# Patient Record
Sex: MALE | Race: WHITE | NOT HISPANIC OR LATINO | Employment: OTHER | URBAN - NONMETROPOLITAN AREA
[De-identification: names, ages, dates, MRNs, and addresses within clinical notes are randomized per-mention and may not be internally consistent; named-entity substitution may affect disease eponyms.]

---

## 2021-10-05 ENCOUNTER — OFFICE VISIT (OUTPATIENT)
Dept: GASTROENTEROLOGY | Facility: CLINIC | Age: 71
End: 2021-10-05

## 2021-10-05 VITALS
DIASTOLIC BLOOD PRESSURE: 80 MMHG | TEMPERATURE: 98 F | WEIGHT: 247 LBS | BODY MASS INDEX: 32.74 KG/M2 | SYSTOLIC BLOOD PRESSURE: 122 MMHG | HEIGHT: 73 IN | HEART RATE: 82 BPM | OXYGEN SATURATION: 98 %

## 2021-10-05 DIAGNOSIS — I10 HTN (HYPERTENSION), BENIGN: ICD-10-CM

## 2021-10-05 DIAGNOSIS — Z78.9 NONSMOKER: ICD-10-CM

## 2021-10-05 DIAGNOSIS — Z12.11 ENCOUNTER FOR SCREENING FOR MALIGNANT NEOPLASM OF COLON: Primary | ICD-10-CM

## 2021-10-05 PROCEDURE — 99204 OFFICE O/P NEW MOD 45 MIN: CPT | Performed by: CLINICAL NURSE SPECIALIST

## 2021-10-05 RX ORDER — ATORVASTATIN CALCIUM 40 MG/1
40 TABLET, FILM COATED ORAL DAILY
COMMUNITY

## 2021-10-05 NOTE — PROGRESS NOTES
Jaime Mckeon  1950      10/5/2021  Chief Complaint   Patient presents with   • Colonoscopy     Subjective   HPI  Jaime Mckeon is a 71 y.o. male who presents as a referral for preventative maintenance. He has no complaints of nausea or vomiting. No change in bowels. No wt loss. No BRBPR. No melena. There is NO family hx for colon cancer. No abdominal pain.  Past Medical History:   Diagnosis Date   • Allergic rhinitis    • Arthritis    • BPH (benign prostatic hyperplasia)    • Disease of thyroid gland    • Hyperlipidemia    • Hypertension    • Obesity      Past Surgical History:   Procedure Laterality Date   • CATARACT EXTRACTION     • COLONOSCOPY  06/27/2006    Hemorrhoids   • ENDOSCOPY  07/11/2006    Grade A esophagitis, small HH   • VASECTOMY       Outpatient Medications Marked as Taking for the 10/5/21 encounter (Office Visit) with Sophie Tavarez APRN   Medication Sig Dispense Refill   • amLODIPine (NORVASC) 10 MG tablet TAKE 1 TABLET BY MOUTH ONCE DAILY.     • atorvastatin (LIPITOR) 40 MG tablet Take 40 mg by mouth Daily.     • azelastine (ASTEPRO) 0.15 % solution nasal spray USE 1 SPRAY INTO EACH NOSTRIL TWICE DAILY     • levothyroxine (SYNTHROID, LEVOTHROID) 25 MCG tablet TAKE 1 TABLET BY MOUTH ONCE DAILY.     • losartan (COZAAR) 100 MG tablet TAKE 1 TABLET BY MOUTH ONCE DAILY     • sildenafil (Viagra) 100 MG tablet Viagra 100 mg tablet   Take 1 tablet every day by oral route.       No Known Allergies  Social History     Socioeconomic History   • Marital status:      Spouse name: Not on file   • Number of children: Not on file   • Years of education: Not on file   • Highest education level: Not on file   Tobacco Use   • Smoking status: Former Smoker   • Smokeless tobacco: Never Used   Substance and Sexual Activity   • Alcohol use: Yes     Comment: Daily wine     Family History   Problem Relation Age of Onset   • Colon cancer Neg Hx    • Colon polyps Neg Hx      Health Maintenance   Topic Date Due  "  • ANNUAL PHYSICAL  Never done   • Pneumococcal Vaccine 65+ (1 of 1 - PPSV23) Never done   • COLORECTAL CANCER SCREENING  06/27/2016   • ZOSTER VACCINE (2 of 3) 05/04/2017   • INFLUENZA VACCINE  Never done   • HEPATITIS C SCREENING  Never done   • LIPID PANEL  Never done   • TDAP/TD VACCINES (2 - Td or Tdap) 07/19/2031   • COVID-19 Vaccine  Completed       REVIEW OF SYSTEMS  General: well appearing, no fever chills or sweats, no unexplained wt loss  HEENT: no acute visual or hearing disturbances  Cardiovascular: No chest pain or palpitations  Pulmonary: No shortness of breath, coughing, wheezing or hemoptysis  : No burning, urgency, hematuria, or dysuria  Musculoskeletal: No joint pain or stiffness  Peripheral: no edema  Skin: No lesions or rashes  Neuro: No dizziness, headaches, stroke, syncope  Endocrine: No hot or cold intolerances  Hematological: No blood dyscrasias    Objective   Vitals:    10/05/21 1310   BP: 122/80   Pulse: 82   Temp: 98 °F (36.7 °C)   SpO2: 98%   Weight: 112 kg (247 lb)   Height: 185.4 cm (73\")     Body mass index is 32.59 kg/m².  Patient's Body mass index is 32.59 kg/m². indicating that he is obese (BMI >30). Obesity-related health conditions include the following: hypertension. Obesity is unchanged. BMI is is above average; BMI management plan is completed. We discussed portion control and increasing exercise..      PHYSICAL EXAM  General: age appropriate well nourished well appearing, no acute distress  Head: normocephalic and atraumatic  Global assessment-supple  Neck-No JVD noted, no lymphadenopathy  Pulmonary-clear to auscultation bilaterally, normal respiratory effort  Cardiovascular-normal rate and rhythm, normal heart sounds, S1 and S2 noted  Abdomen-soft, non tender, non distended, normal bowel sounds all 4 quadrants, no hepatosplenomegaly noted  Extremities-No clubbing cyanosis or edema  Neuro-Non focal, converses appropriately, awake, alert, oriented    Assessment/Plan "     Diagnoses and all orders for this visit:    1. Encounter for screening for malignant neoplasm of colon (Primary)  -     Case Request; Standing  -     Implement Anesthesia Orders Day of Procedure; Standing  -     Obtain Informed Consent; Standing  -     Verify bowel prep was successful; Standing  -     Case Request  -     polyethylene glycol (GoLYTELY) 236 g solution; Take as directed by office instructions.  Dispense: 4000 mL; Refill: 0    2. Nonsmoker    3. HTN (hypertension), benign  Comments:  cont BP medicationthe day of procedure        COLONOSCOPY WITH ANESTHESIA (N/A)  Body mass index is 32.59 kg/m².    Patient instructions on prep prior to procedure provided to the patient.    All risks, benefits, alternatives, and indications of colonoscopy procedure have been discussed with the patient. Risks to include perforation of the colon requiring possible surgery or colostomy, risk of bleeding from biopsies or removal of colon tissue, possibility of missing a colon polyp or cancer, or adverse drug reaction.  Benefits to include the diagnosis and management of disease of the colon and rectum. Alternatives to include barium enema, radiographic evaluation, lab testing or no intervention. Pt verbalizes understanding and agrees.     Sophie Tavarez, APRN  10/5/2021  13:20 CDT      IF YOU SMOKE OR USE TOBACCO PLEASE READ THE FOLLOWIN minutes reading provided    Why is smoking bad for me?  Smoking increases the risk of heart disease, lung disease, vascular disease, stroke, and cancer.     If you smoke, STOP!    If you would like more information on quitting smoking, please visit the [employer] website: [email or web address]   This link will provide additional resources including the QUIT line and the Beat the Pack support groups.     For more information:    Quit Now [place]  -QUIT-NOW  [email or web address]

## 2021-10-06 PROBLEM — Z12.11 ENCOUNTER FOR SCREENING FOR MALIGNANT NEOPLASM OF COLON: Status: ACTIVE | Noted: 2021-10-06

## 2022-02-22 ENCOUNTER — ANESTHESIA (OUTPATIENT)
Dept: GASTROENTEROLOGY | Facility: HOSPITAL | Age: 72
End: 2022-02-22

## 2022-02-22 ENCOUNTER — ANESTHESIA EVENT (OUTPATIENT)
Dept: GASTROENTEROLOGY | Facility: HOSPITAL | Age: 72
End: 2022-02-22

## 2022-02-22 ENCOUNTER — HOSPITAL ENCOUNTER (OUTPATIENT)
Facility: HOSPITAL | Age: 72
Setting detail: HOSPITAL OUTPATIENT SURGERY
Discharge: HOME | End: 2022-02-22
Attending: INTERNAL MEDICINE | Admitting: INTERNAL MEDICINE

## 2022-02-22 VITALS
BODY MASS INDEX: 32.87 KG/M2 | HEART RATE: 78 BPM | WEIGHT: 248 LBS | HEIGHT: 73 IN | OXYGEN SATURATION: 100 % | DIASTOLIC BLOOD PRESSURE: 83 MMHG | RESPIRATION RATE: 16 BRPM | TEMPERATURE: 97.5 F | SYSTOLIC BLOOD PRESSURE: 127 MMHG

## 2022-02-22 DIAGNOSIS — Z12.11 ENCOUNTER FOR SCREENING FOR MALIGNANT NEOPLASM OF COLON: ICD-10-CM

## 2022-02-22 PROCEDURE — 45380 COLONOSCOPY AND BIOPSY: CPT | Performed by: INTERNAL MEDICINE

## 2022-02-22 PROCEDURE — 25010000002 PROPOFOL 10 MG/ML EMULSION: Performed by: NURSE ANESTHETIST, CERTIFIED REGISTERED

## 2022-02-22 PROCEDURE — 45385 COLONOSCOPY W/LESION REMOVAL: CPT | Performed by: INTERNAL MEDICINE

## 2022-02-22 PROCEDURE — 88305 TISSUE EXAM BY PATHOLOGIST: CPT | Performed by: INTERNAL MEDICINE

## 2022-02-22 RX ORDER — SODIUM CHLORIDE 0.9 % (FLUSH) 0.9 %
10 SYRINGE (ML) INJECTION AS NEEDED
Status: CANCELLED | OUTPATIENT
Start: 2022-02-22

## 2022-02-22 RX ORDER — SODIUM CHLORIDE 9 MG/ML
500 INJECTION, SOLUTION INTRAVENOUS CONTINUOUS PRN
Status: DISCONTINUED | OUTPATIENT
Start: 2022-02-22 | End: 2022-02-22 | Stop reason: HOSPADM

## 2022-02-22 RX ORDER — SODIUM CHLORIDE 0.9 % (FLUSH) 0.9 %
10 SYRINGE (ML) INJECTION AS NEEDED
Status: DISCONTINUED | OUTPATIENT
Start: 2022-02-22 | End: 2022-02-22 | Stop reason: HOSPADM

## 2022-02-22 RX ORDER — PROPOFOL 10 MG/ML
VIAL (ML) INTRAVENOUS AS NEEDED
Status: DISCONTINUED | OUTPATIENT
Start: 2022-02-22 | End: 2022-02-22 | Stop reason: SURG

## 2022-02-22 RX ORDER — SODIUM CHLORIDE 0.9 % (FLUSH) 0.9 %
10 SYRINGE (ML) INJECTION EVERY 12 HOURS SCHEDULED
Status: CANCELLED | OUTPATIENT
Start: 2022-02-22

## 2022-02-22 RX ORDER — SODIUM CHLORIDE 9 MG/ML
100 INJECTION, SOLUTION INTRAVENOUS CONTINUOUS
Status: CANCELLED | OUTPATIENT
Start: 2022-02-22

## 2022-02-22 RX ORDER — LIDOCAINE HYDROCHLORIDE 20 MG/ML
INJECTION, SOLUTION EPIDURAL; INFILTRATION; INTRACAUDAL; PERINEURAL AS NEEDED
Status: DISCONTINUED | OUTPATIENT
Start: 2022-02-22 | End: 2022-02-22 | Stop reason: SURG

## 2022-02-22 RX ADMIN — PROPOFOL 200 MG: 10 INJECTION, EMULSION INTRAVENOUS at 08:46

## 2022-02-22 RX ADMIN — LIDOCAINE HYDROCHLORIDE 50 MG: 20 INJECTION, SOLUTION EPIDURAL; INFILTRATION; INTRACAUDAL; PERINEURAL at 08:24

## 2022-02-22 RX ADMIN — PROPOFOL 200 MG: 10 INJECTION, EMULSION INTRAVENOUS at 08:24

## 2022-02-22 RX ADMIN — PROPOFOL 200 MG: 10 INJECTION, EMULSION INTRAVENOUS at 08:32

## 2022-02-22 RX ADMIN — SODIUM CHLORIDE 500 ML: 9 INJECTION, SOLUTION INTRAVENOUS at 07:26

## 2022-02-22 NOTE — ANESTHESIA PREPROCEDURE EVALUATION
Anesthesia Evaluation     Patient summary reviewed and Nursing notes reviewed   no history of anesthetic complications:  NPO Solid Status: > 8 hours  NPO Liquid Status: > 8 hours           Airway   Mallampati: I  TM distance: >3 FB  Neck ROM: full  No difficulty expected  Dental      Pulmonary    (+) a smoker Former,   Cardiovascular   Exercise tolerance: excellent (>7 METS)    (+) hypertension well controlled 2 medications or greater, hyperlipidemia,       Neuro/Psych- negative ROS  GI/Hepatic/Renal/Endo    (+) obesity,   thyroid problem hypothyroidism    Musculoskeletal     (+) arthralgias,   Abdominal    Substance History - negative use     OB/GYN          Other   arthritis,                    Anesthesia Plan    ASA 2     MAC     intravenous induction     Anesthetic plan, all risks, benefits, and alternatives have been provided, discussed and informed consent has been obtained with: patient.        CODE STATUS:

## 2022-02-22 NOTE — ANESTHESIA POSTPROCEDURE EVALUATION
"Patient: Jaime Mckeon    Procedure Summary     Date: 02/22/22 Room / Location: [facility] ENDOSCOPY 5 / [facility] END[facility]    Anesthesia Start: 0823 Anesthesia Stop: 0857    Procedure: COLONOSCOPY WITH ANESTHESIA (N/A ) Diagnosis:       Encounter for screening for malignant neoplasm of colon      (Encounter for screening for malignant neoplasm of colon [Z12.11])    Surgeons: Elan Davis MD Provider: Manuel Mccabe CRNA    Anesthesia Type: MAC ASA Status: 2          Anesthesia Type: MAC    Vitals  Vitals Value Taken Time   /72 02/22/22 0857   Temp     Pulse 87 02/22/22 0857   Resp 11 02/22/22 0856   SpO2 96 % 02/22/22 0857   Vitals shown include unvalidated device data.        Post Anesthesia Care and Evaluation    Patient location during evaluation: PHASE II  Patient participation: complete - patient participated  Level of consciousness: awake and alert  Pain management: adequate  Airway patency: patent  Anesthetic complications: No anesthetic complications    Cardiovascular status: acceptable  Respiratory status: acceptable  Hydration status: acceptable    Comments: Blood pressure 109/72, pulse 87, temperature 97.5 °F (36.4 °C), temperature source Tympanic, resp. rate 11, height 185.4 cm (73\"), weight 112 kg (248 lb), SpO2 96 %.    Pt discharged from PACU based on christopher score >8      "

## 2022-02-28 LAB
CYTO UR: NORMAL
LAB AP CASE REPORT: NORMAL
PATH REPORT.FINAL DX SPEC: NORMAL
PATH REPORT.GROSS SPEC: NORMAL

## 2022-04-13 ENCOUNTER — OFFICE VISIT (OUTPATIENT)
Dept: OTOLARYNGOLOGY | Facility: CLINIC | Age: 72
End: 2022-04-13

## 2022-04-13 VITALS
SYSTOLIC BLOOD PRESSURE: 150 MMHG | DIASTOLIC BLOOD PRESSURE: 83 MMHG | HEART RATE: 84 BPM | HEIGHT: 71 IN | TEMPERATURE: 98.2 F | BODY MASS INDEX: 35.14 KG/M2 | WEIGHT: 251 LBS

## 2022-04-13 DIAGNOSIS — Z86.69 H/O PERFORATION OF TYMPANIC MEMBRANE: Primary | ICD-10-CM

## 2022-04-13 PROCEDURE — 99212 OFFICE O/P EST SF 10 MIN: CPT | Performed by: EMERGENCY MEDICINE

## 2022-04-13 NOTE — PROGRESS NOTES
LICO Neville  [facility]  [address], [address]  [place] [address]  Fax [phone]  Phone [phone]      Visit Type: NEW PATIENT   Chief Complaint   Patient presents with   • Ear Problem     Tm perforation        HPI  He complains of an eardrum perforation. The symptoms are localized to the left side. The patient has had no obvious clinical symptoms symptoms. The symptoms have been resolved  There have been no identified factors that aggravate the symptoms. There have been no factors that have improved the symptoms.    Past Medical History:   Diagnosis Date   • Allergic rhinitis    • Arthritis    • BPH (benign prostatic hyperplasia)    • Disease of thyroid gland    • Hyperlipidemia    • Hypertension    • Obesity        Past Surgical History:   Procedure Laterality Date   • CATARACT EXTRACTION     • COLONOSCOPY  06/27/2006    Hemorrhoids   • COLONOSCOPY N/A 2/22/2022    Procedure: COLONOSCOPY WITH ANESTHESIA;  Surgeon: Elan Davis MD;  Location: [facility] ENDOSCOPY;  Service: Gastroenterology;  Laterality: N/A;  pre: screen  post: polyps. diverticulosis.   Ernesto Swanson MD   • ENDOSCOPY  07/11/2006    Grade A esophagitis, small HH   • VASECTOMY         Family History: His family history is not on file.     Social History: He  reports that he has quit smoking. He has never used smokeless tobacco. He reports current alcohol use. He reports that he does not use drugs.    Home Medications:  amLODIPine, atorvastatin, azelastine, levothyroxine, losartan, and sildenafil    Allergies:  He has No Known Allergies.       Vital Signs:   Temp:  [98.2 °F (36.8 °C)] 98.2 °F (36.8 °C)  Heart Rate:  [84] 84  BP: (150)/(83) 150/83  ENT Physical Exam  Ear  Hearing: intact;  Auricles: right auricle normal; left auricle normal;  External Mastoids: right external mastoid normal; left external mastoid normal;  Ear Canals: right ear  canal normal; left ear canal normal;  Tympanic Membranes: right tympanic membrane normal; left tympanic membrane normal;         Result Review    RESULTS REVIEW    I have reviewed the patients old records in the chart.     Assessment/Plan    Diagnoses and all orders for this visit:    1. H/O perforation of tympanic membrane (Primary)            Conservative management.  There is no evidence of tympanic membrane perforation today.  Patient to call with any new ear problems.      Return if symptoms worsen or fail to improve.      Sophie Mast, APRN  04/13/22  09:57 CDT

## 2023-02-06 ENCOUNTER — APPOINTMENT (OUTPATIENT)
Dept: CT IMAGING | Facility: HOSPITAL | Age: 73
DRG: 62 | End: 2023-02-06
Payer: MEDICARE

## 2023-02-06 ENCOUNTER — HOSPITAL ENCOUNTER (INPATIENT)
Facility: HOSPITAL | Age: 73
LOS: 2 days | Discharge: HOME | DRG: 62 | End: 2023-02-08
Attending: FAMILY MEDICINE | Admitting: INTERNAL MEDICINE
Payer: MEDICARE

## 2023-02-06 DIAGNOSIS — Z78.9 DECREASED ACTIVITIES OF DAILY LIVING (ADL): ICD-10-CM

## 2023-02-06 DIAGNOSIS — I63.311 CEREBROVASCULAR ACCIDENT (CVA) DUE TO THROMBOSIS OF RIGHT MIDDLE CEREBRAL ARTERY: ICD-10-CM

## 2023-02-06 DIAGNOSIS — I63.9 CEREBROVASCULAR ACCIDENT (CVA), UNSPECIFIED MECHANISM: Primary | ICD-10-CM

## 2023-02-06 DIAGNOSIS — I51.7 ENLARGED LA (LEFT ATRIUM): ICD-10-CM

## 2023-02-06 DIAGNOSIS — Z74.09 IMPAIRED MOBILITY: ICD-10-CM

## 2023-02-06 DIAGNOSIS — Z91.89: ICD-10-CM

## 2023-02-06 LAB
ABO GROUP BLD: NORMAL
ALBUMIN SERPL-MCNC: 4.7 G/DL (ref 3.5–5.2)
ALBUMIN/GLOB SERPL: 1.7 G/DL
ALP SERPL-CCNC: 115 U/L (ref 39–117)
ALT SERPL W P-5'-P-CCNC: 26 U/L (ref 1–41)
ANION GAP SERPL CALCULATED.3IONS-SCNC: 12 MMOL/L (ref 5–15)
APTT PPP: 23.7 SECONDS (ref 24.1–35)
AST SERPL-CCNC: 28 U/L (ref 1–40)
BASOPHILS # BLD AUTO: 0.06 10*3/MM3 (ref 0–0.2)
BASOPHILS NFR BLD AUTO: 0.7 % (ref 0–1.5)
BILIRUB SERPL-MCNC: 0.2 MG/DL (ref 0–1.2)
BILIRUB UR QL STRIP: NEGATIVE
BLD GP AB SCN SERPL QL: NEGATIVE
BUN SERPL-MCNC: 24 MG/DL (ref 8–23)
BUN/CREAT SERPL: 22.9 (ref 7–25)
CALCIUM SPEC-SCNC: 9.4 MG/DL (ref 8.6–10.5)
CHLORIDE SERPL-SCNC: 107 MMOL/L (ref 98–107)
CK SERPL-CCNC: 253 U/L (ref 20–200)
CLARITY UR: CLEAR
CO2 SERPL-SCNC: 23 MMOL/L (ref 22–29)
COLOR UR: YELLOW
CREAT SERPL-MCNC: 1.05 MG/DL (ref 0.76–1.27)
CRP SERPL-MCNC: <0.3 MG/DL (ref 0–0.5)
DEPRECATED RDW RBC AUTO: 45.8 FL (ref 37–54)
EGFRCR SERPLBLD CKD-EPI 2021: 75.9 ML/MIN/1.73
EOSINOPHIL # BLD AUTO: 0.2 10*3/MM3 (ref 0–0.4)
EOSINOPHIL NFR BLD AUTO: 2.5 % (ref 0.3–6.2)
ERYTHROCYTE [DISTWIDTH] IN BLOOD BY AUTOMATED COUNT: 13.4 % (ref 12.3–15.4)
ERYTHROCYTE [SEDIMENTATION RATE] IN BLOOD: 8 MM/HR (ref 0–20)
GLOBULIN UR ELPH-MCNC: 2.7 GM/DL
GLUCOSE BLDC GLUCOMTR-MCNC: 115 MG/DL (ref 70–130)
GLUCOSE SERPL-MCNC: 118 MG/DL (ref 65–99)
GLUCOSE UR STRIP-MCNC: NEGATIVE MG/DL
HCT VFR BLD AUTO: 40.6 % (ref 37.5–51)
HGB BLD-MCNC: 13.3 G/DL (ref 13–17.7)
HGB UR QL STRIP.AUTO: NEGATIVE
HOLD SPECIMEN: NORMAL
HOLD SPECIMEN: NORMAL
IMM GRANULOCYTES # BLD AUTO: 0.02 10*3/MM3 (ref 0–0.05)
IMM GRANULOCYTES NFR BLD AUTO: 0.2 % (ref 0–0.5)
INR PPP: 1.02 (ref 0.91–1.09)
KETONES UR QL STRIP: NEGATIVE
LEUKOCYTE ESTERASE UR QL STRIP.AUTO: NEGATIVE
LYMPHOCYTES # BLD AUTO: 3.08 10*3/MM3 (ref 0.7–3.1)
LYMPHOCYTES NFR BLD AUTO: 38.4 % (ref 19.6–45.3)
MAGNESIUM SERPL-MCNC: 2 MG/DL (ref 1.6–2.4)
MCH RBC QN AUTO: 30.2 PG (ref 26.6–33)
MCHC RBC AUTO-ENTMCNC: 32.8 G/DL (ref 31.5–35.7)
MCV RBC AUTO: 92.3 FL (ref 79–97)
MONOCYTES # BLD AUTO: 0.98 10*3/MM3 (ref 0.1–0.9)
MONOCYTES NFR BLD AUTO: 12.2 % (ref 5–12)
NEUTROPHILS NFR BLD AUTO: 3.68 10*3/MM3 (ref 1.7–7)
NEUTROPHILS NFR BLD AUTO: 46 % (ref 42.7–76)
NITRITE UR QL STRIP: NEGATIVE
NRBC BLD AUTO-RTO: 0 /100 WBC (ref 0–0.2)
PH UR STRIP.AUTO: 7 [PH] (ref 5–8)
PLATELET # BLD AUTO: 243 10*3/MM3 (ref 140–450)
PMV BLD AUTO: 11.1 FL (ref 6–12)
POTASSIUM SERPL-SCNC: 3.9 MMOL/L (ref 3.5–5.2)
PROT SERPL-MCNC: 7.4 G/DL (ref 6–8.5)
PROT UR QL STRIP: NEGATIVE
PROTHROMBIN TIME: 13.5 SECONDS (ref 11.8–14.8)
RBC # BLD AUTO: 4.4 10*6/MM3 (ref 4.14–5.8)
RH BLD: POSITIVE
SODIUM SERPL-SCNC: 142 MMOL/L (ref 136–145)
SP GR UR STRIP: >=1.03 (ref 1–1.03)
T&S EXPIRATION DATE: NORMAL
TROPONIN T SERPL-MCNC: <0.01 NG/ML (ref 0–0.03)
UROBILINOGEN UR QL STRIP: NORMAL
WBC NRBC COR # BLD: 8.02 10*3/MM3 (ref 3.4–10.8)

## 2023-02-06 PROCEDURE — 86901 BLOOD TYPING SEROLOGIC RH(D): CPT | Performed by: FAMILY MEDICINE

## 2023-02-06 PROCEDURE — 82962 GLUCOSE BLOOD TEST: CPT

## 2023-02-06 PROCEDURE — 81003 URINALYSIS AUTO W/O SCOPE: CPT | Performed by: FAMILY MEDICINE

## 2023-02-06 PROCEDURE — 0 IOPAMIDOL PER 1 ML: Performed by: FAMILY MEDICINE

## 2023-02-06 PROCEDURE — 85025 COMPLETE CBC W/AUTO DIFF WBC: CPT | Performed by: FAMILY MEDICINE

## 2023-02-06 PROCEDURE — 70498 CT ANGIOGRAPHY NECK: CPT

## 2023-02-06 PROCEDURE — 70496 CT ANGIOGRAPHY HEAD: CPT

## 2023-02-06 PROCEDURE — 93010 ELECTROCARDIOGRAM REPORT: CPT | Performed by: INTERNAL MEDICINE

## 2023-02-06 PROCEDURE — 85652 RBC SED RATE AUTOMATED: CPT | Performed by: FAMILY MEDICINE

## 2023-02-06 PROCEDURE — 4A03X5D MEASUREMENT OF ARTERIAL FLOW, INTRACRANIAL, EXTERNAL APPROACH: ICD-10-PCS | Performed by: RADIOLOGY

## 2023-02-06 PROCEDURE — 80053 COMPREHEN METABOLIC PANEL: CPT | Performed by: FAMILY MEDICINE

## 2023-02-06 PROCEDURE — 85610 PROTHROMBIN TIME: CPT | Performed by: FAMILY MEDICINE

## 2023-02-06 PROCEDURE — 3E03317 INTRODUCTION OF OTHER THROMBOLYTIC INTO PERIPHERAL VEIN, PERCUTANEOUS APPROACH: ICD-10-PCS | Performed by: PSYCHIATRY & NEUROLOGY

## 2023-02-06 PROCEDURE — 99285 EMERGENCY DEPT VISIT HI MDM: CPT

## 2023-02-06 PROCEDURE — 99291 CRITICAL CARE FIRST HOUR: CPT | Performed by: PSYCHIATRY & NEUROLOGY

## 2023-02-06 PROCEDURE — 86900 BLOOD TYPING SEROLOGIC ABO: CPT | Performed by: FAMILY MEDICINE

## 2023-02-06 PROCEDURE — 84484 ASSAY OF TROPONIN QUANT: CPT | Performed by: FAMILY MEDICINE

## 2023-02-06 PROCEDURE — 70450 CT HEAD/BRAIN W/O DYE: CPT

## 2023-02-06 PROCEDURE — 0042T HC CT CEREBRAL PERFUSION W/WO CONTRAST: CPT

## 2023-02-06 PROCEDURE — 83735 ASSAY OF MAGNESIUM: CPT | Performed by: FAMILY MEDICINE

## 2023-02-06 PROCEDURE — 82550 ASSAY OF CK (CPK): CPT | Performed by: FAMILY MEDICINE

## 2023-02-06 PROCEDURE — 86850 RBC ANTIBODY SCREEN: CPT | Performed by: FAMILY MEDICINE

## 2023-02-06 PROCEDURE — 93005 ELECTROCARDIOGRAM TRACING: CPT | Performed by: FAMILY MEDICINE

## 2023-02-06 PROCEDURE — 86140 C-REACTIVE PROTEIN: CPT | Performed by: FAMILY MEDICINE

## 2023-02-06 PROCEDURE — 85730 THROMBOPLASTIN TIME PARTIAL: CPT | Performed by: FAMILY MEDICINE

## 2023-02-06 PROCEDURE — 25010000002 ALTEPLASE PER 1 MG: Performed by: PSYCHIATRY & NEUROLOGY

## 2023-02-06 RX ORDER — ASPIRIN 300 MG/1
300 SUPPOSITORY RECTAL DAILY
Status: DISCONTINUED | OUTPATIENT
Start: 2023-02-07 | End: 2023-02-08 | Stop reason: HOSPADM

## 2023-02-06 RX ORDER — SODIUM CHLORIDE 9 MG/ML
40 INJECTION, SOLUTION INTRAVENOUS AS NEEDED
Status: DISCONTINUED | OUTPATIENT
Start: 2023-02-06 | End: 2023-02-08 | Stop reason: HOSPADM

## 2023-02-06 RX ORDER — ASPIRIN 325 MG
325 TABLET ORAL DAILY
Status: DISCONTINUED | OUTPATIENT
Start: 2023-02-07 | End: 2023-02-08 | Stop reason: HOSPADM

## 2023-02-06 RX ORDER — LABETALOL HYDROCHLORIDE 5 MG/ML
INJECTION, SOLUTION INTRAVENOUS
Status: DISCONTINUED
Start: 2023-02-06 | End: 2023-02-06 | Stop reason: WASHOUT

## 2023-02-06 RX ORDER — SODIUM CHLORIDE 0.9 % (FLUSH) 0.9 %
10 SYRINGE (ML) INJECTION AS NEEDED
Status: DISCONTINUED | OUTPATIENT
Start: 2023-02-06 | End: 2023-02-08 | Stop reason: HOSPADM

## 2023-02-06 RX ORDER — SODIUM CHLORIDE 0.9 % (FLUSH) 0.9 %
10 SYRINGE (ML) INJECTION EVERY 12 HOURS SCHEDULED
Status: DISCONTINUED | OUTPATIENT
Start: 2023-02-06 | End: 2023-02-08 | Stop reason: HOSPADM

## 2023-02-06 RX ORDER — AMLODIPINE BESYLATE 10 MG/1
10 TABLET ORAL DAILY
Status: DISCONTINUED | OUTPATIENT
Start: 2023-02-07 | End: 2023-02-08 | Stop reason: HOSPADM

## 2023-02-06 RX ORDER — LABETALOL HYDROCHLORIDE 5 MG/ML
10 INJECTION, SOLUTION INTRAVENOUS
Status: DISCONTINUED | OUTPATIENT
Start: 2023-02-06 | End: 2023-02-08 | Stop reason: HOSPADM

## 2023-02-06 RX ORDER — LOSARTAN POTASSIUM 50 MG/1
100 TABLET ORAL DAILY
Status: DISCONTINUED | OUTPATIENT
Start: 2023-02-07 | End: 2023-02-08 | Stop reason: HOSPADM

## 2023-02-06 RX ORDER — LEVOTHYROXINE SODIUM 0.03 MG/1
25 TABLET ORAL
Status: DISCONTINUED | OUTPATIENT
Start: 2023-02-07 | End: 2023-02-08 | Stop reason: HOSPADM

## 2023-02-06 RX ORDER — ATORVASTATIN CALCIUM 40 MG/1
40 TABLET, FILM COATED ORAL DAILY
Status: DISCONTINUED | OUTPATIENT
Start: 2023-02-07 | End: 2023-02-08 | Stop reason: HOSPADM

## 2023-02-06 RX ORDER — SODIUM CHLORIDE 9 MG/ML
100 INJECTION, SOLUTION INTRAVENOUS ONCE
Status: COMPLETED | OUTPATIENT
Start: 2023-02-06 | End: 2023-02-06

## 2023-02-06 RX ADMIN — SODIUM CHLORIDE 100 ML: 9 INJECTION, SOLUTION INTRAVENOUS at 20:21

## 2023-02-06 RX ADMIN — ALTEPLASE 81 MG: KIT at 19:39

## 2023-02-06 RX ADMIN — Medication 10 ML: at 23:18

## 2023-02-06 RX ADMIN — IOPAMIDOL 125 ML: 755 INJECTION, SOLUTION INTRAVENOUS at 18:46

## 2023-02-07 ENCOUNTER — APPOINTMENT (OUTPATIENT)
Dept: CT IMAGING | Facility: HOSPITAL | Age: 73
DRG: 62 | End: 2023-02-07
Payer: MEDICARE

## 2023-02-07 ENCOUNTER — APPOINTMENT (OUTPATIENT)
Dept: CARDIOLOGY | Facility: HOSPITAL | Age: 73
DRG: 62 | End: 2023-02-07
Payer: MEDICARE

## 2023-02-07 ENCOUNTER — APPOINTMENT (OUTPATIENT)
Dept: MRI IMAGING | Facility: HOSPITAL | Age: 73
DRG: 62 | End: 2023-02-07
Payer: MEDICARE

## 2023-02-07 PROBLEM — I10 ESSENTIAL HYPERTENSION: Status: ACTIVE | Noted: 2023-02-07

## 2023-02-07 LAB
BH CV ECHO MEAS - AO MAX PG: 16.3 MMHG
BH CV ECHO MEAS - AO MEAN PG: 9 MMHG
BH CV ECHO MEAS - AO ROOT DIAM: 3.4 CM
BH CV ECHO MEAS - AO V2 MAX: 202 CM/SEC
BH CV ECHO MEAS - AO V2 VTI: 42.5 CM
BH CV ECHO MEAS - AVA(I,D): 1.61 CM2
BH CV ECHO MEAS - EDV(CUBED): 97.3 ML
BH CV ECHO MEAS - EDV(MOD-SP4): 108 ML
BH CV ECHO MEAS - EF(MOD-SP4): 68.4 %
BH CV ECHO MEAS - ESV(CUBED): 24.4 ML
BH CV ECHO MEAS - ESV(MOD-SP4): 34.1 ML
BH CV ECHO MEAS - FS: 37 %
BH CV ECHO MEAS - IVS/LVPW: 1.1 CM
BH CV ECHO MEAS - IVSD: 1.1 CM
BH CV ECHO MEAS - LA DIMENSION: 3.9 CM
BH CV ECHO MEAS - LAT PEAK E' VEL: 8.7 CM/SEC
BH CV ECHO MEAS - LV MASS(C)D: 169.9 GRAMS
BH CV ECHO MEAS - LV MAX PG: 2.7 MMHG
BH CV ECHO MEAS - LV MEAN PG: 2 MMHG
BH CV ECHO MEAS - LV V1 MAX: 82.8 CM/SEC
BH CV ECHO MEAS - LV V1 VTI: 21.8 CM
BH CV ECHO MEAS - LVIDD: 4.6 CM
BH CV ECHO MEAS - LVIDS: 2.9 CM
BH CV ECHO MEAS - LVOT AREA: 3.1 CM2
BH CV ECHO MEAS - LVOT DIAM: 2 CM
BH CV ECHO MEAS - LVPWD: 1 CM
BH CV ECHO MEAS - MED PEAK E' VEL: 6.1 CM/SEC
BH CV ECHO MEAS - MV A MAX VEL: 108 CM/SEC
BH CV ECHO MEAS - MV DEC SLOPE: 424 CM/SEC2
BH CV ECHO MEAS - MV DEC TIME: 0.2 MSEC
BH CV ECHO MEAS - MV E MAX VEL: 71.8 CM/SEC
BH CV ECHO MEAS - MV E/A: 0.66
BH CV ECHO MEAS - MV P1/2T: 52.8 MSEC
BH CV ECHO MEAS - MVA(P1/2T): 4.2 CM2
BH CV ECHO MEAS - PA V2 MAX: 63.1 CM/SEC
BH CV ECHO MEAS - RAP SYSTOLE: 5 MMHG
BH CV ECHO MEAS - RVDD: 4 CM
BH CV ECHO MEAS - RVSP: 19.9 MMHG
BH CV ECHO MEAS - SV(LVOT): 68.5 ML
BH CV ECHO MEAS - SV(MOD-SP4): 73.9 ML
BH CV ECHO MEAS - TR MAX PG: 14.9 MMHG
BH CV ECHO MEAS - TR MAX VEL: 193 CM/SEC
BH CV ECHO MEASUREMENTS AVERAGE E/E' RATIO: 9.7
BH CV ECHO SHUNT ASSESSMENT PERFORMED (HIDDEN SCRIPTING): 1
BH CV XLRA - TDI S': 20.2 CM/SEC
CHOLEST SERPL-MCNC: 132 MG/DL (ref 0–200)
HBA1C MFR BLD: 5.5 % (ref 4.8–5.6)
HDLC SERPL-MCNC: 44 MG/DL (ref 40–60)
LDLC SERPL CALC-MCNC: 69 MG/DL (ref 0–100)
LDLC/HDLC SERPL: 1.53 {RATIO}
LEFT ATRIUM VOLUME INDEX: 36.2 ML/M2
MAXIMAL PREDICTED HEART RATE: 149 BPM
QT INTERVAL: 350 MS
QTC INTERVAL: 439 MS
STRESS TARGET HR: 127 BPM
TRIGL SERPL-MCNC: 103 MG/DL (ref 0–150)
VLDLC SERPL-MCNC: 19 MG/DL (ref 5–40)

## 2023-02-07 PROCEDURE — 93306 TTE W/DOPPLER COMPLETE: CPT

## 2023-02-07 PROCEDURE — 99291 CRITICAL CARE FIRST HOUR: CPT | Performed by: PSYCHIATRY & NEUROLOGY

## 2023-02-07 PROCEDURE — 83036 HEMOGLOBIN GLYCOSYLATED A1C: CPT | Performed by: PSYCHIATRY & NEUROLOGY

## 2023-02-07 PROCEDURE — 25010000002 LORAZEPAM PER 2 MG: Performed by: INTERNAL MEDICINE

## 2023-02-07 PROCEDURE — 97162 PT EVAL MOD COMPLEX 30 MIN: CPT

## 2023-02-07 PROCEDURE — 97166 OT EVAL MOD COMPLEX 45 MIN: CPT | Performed by: OCCUPATIONAL THERAPIST

## 2023-02-07 PROCEDURE — 70450 CT HEAD/BRAIN W/O DYE: CPT

## 2023-02-07 PROCEDURE — 93306 TTE W/DOPPLER COMPLETE: CPT | Performed by: EMERGENCY MEDICINE

## 2023-02-07 PROCEDURE — 80061 LIPID PANEL: CPT | Performed by: PSYCHIATRY & NEUROLOGY

## 2023-02-07 PROCEDURE — 25010000002 PERFLUTREN 6.52 MG/ML SUSPENSION: Performed by: INTERNAL MEDICINE

## 2023-02-07 PROCEDURE — 92523 SPEECH SOUND LANG COMPREHEN: CPT | Performed by: SPEECH-LANGUAGE PATHOLOGIST

## 2023-02-07 RX ORDER — LATANOPROST 50 UG/ML
1 SOLUTION/ DROPS OPHTHALMIC NIGHTLY
COMMUNITY

## 2023-02-07 RX ORDER — LORAZEPAM 2 MG/ML
2 INJECTION INTRAMUSCULAR ONCE
Status: COMPLETED | OUTPATIENT
Start: 2023-02-07 | End: 2023-02-07

## 2023-02-07 RX ADMIN — Medication 10 ML: at 10:10

## 2023-02-07 RX ADMIN — ATORVASTATIN CALCIUM 40 MG: 40 TABLET, FILM COATED ORAL at 08:15

## 2023-02-07 RX ADMIN — LEVOTHYROXINE SODIUM 25 MCG: 25 TABLET ORAL at 06:13

## 2023-02-07 RX ADMIN — LOSARTAN POTASSIUM 100 MG: 50 TABLET, FILM COATED ORAL at 08:14

## 2023-02-07 RX ADMIN — ASPIRIN 325 MG: 325 TABLET ORAL at 21:27

## 2023-02-07 RX ADMIN — AMLODIPINE BESYLATE 10 MG: 10 TABLET ORAL at 08:14

## 2023-02-07 RX ADMIN — Medication 10 ML: at 21:39

## 2023-02-07 RX ADMIN — PERFLUTREN 8.48 MG: 6.52 INJECTION, SUSPENSION INTRAVENOUS at 10:22

## 2023-02-07 RX ADMIN — LORAZEPAM 2 MG: 2 INJECTION INTRAMUSCULAR at 10:23

## 2023-02-07 NOTE — PLAN OF CARE
Goal Outcome Evaluation:  Plan of Care Reviewed With: (P) patient, spouse        Progress: (P) improving     Patient presents with left hemiparesis secondary to stroke. Pt is alert, oriented, and highly cooperative. There are no complaints of pain. ST assessed the pt's communication and cognitive levels. There are no signs of any communicative deficits. Executive function, reasoning, and memory are WNL. Patient's spouse states that he is at baseline aside from left sided muscle weakness. Pt does not meet requirements for skilled intervention at this time. Contact ST if any acute changes occur.

## 2023-02-07 NOTE — PROGRESS NOTES
Neurology Progress Note      Chief Complaint:  stroke  Length of Stay:  1   Subjective     Subjective:    Doing well this morning.  He did have a little bit of worsening overnight with some residual left arm weakness.  This morning he feels almost back to her baseline with potentially some mild weakness in his left arm but his left leg is back at full strength.  He has no sensory deficits this morning.  He denies any headaches.  Medications:  Current Facility-Administered Medications   Medication Dose Route Frequency Provider Last Rate Last Admin   • amLODIPine (NORVASC) tablet 10 mg  10 mg Oral Daily Dayday Fay MD   10 mg at 02/07/23 0814   • aspirin tablet 325 mg  325 mg Oral Daily Dayday Fay MD        Or   • aspirin suppository 300 mg  300 mg Rectal Daily Dayday Fay MD       • atorvastatin (LIPITOR) tablet 40 mg  40 mg Oral Daily Dayday Fay MD   40 mg at 02/07/23 0815   • labetalol (NORMODYNE,TRANDATE) 300 mg in sodium chloride 0.9 % 300 mL infusion  0.5-2 mg/min Intravenous Titrated Dayday Fay MD        And   • labetalol (NORMODYNE,TRANDATE) injection 10 mg  10 mg Intravenous Q10 Min PRN Dayday Fay MD       • levothyroxine (SYNTHROID, LEVOTHROID) tablet 25 mcg  25 mcg Oral Q AM Dayday Fay MD   25 mcg at 02/07/23 0613   • losartan (COZAAR) tablet 100 mg  100 mg Oral Daily Dayday Fay MD   100 mg at 02/07/23 0814   • sodium chloride 0.9 % flush 10 mL  10 mL Intravenous PRN Dayday Fay MD       • sodium chloride 0.9 % flush 10 mL  10 mL Intravenous Q12H Dayday Fay MD   10 mL at 02/07/23 1010   • sodium chloride 0.9 % flush 10 mL  10 mL Intravenous PRN Dayday Fay MD       • sodium chloride 0.9 % infusion 40 mL  40 mL Intravenous PRN Dayday Fay MD                 Objective      Vital Signs  Temp:  [97.8 °F (36.6 °C)-98.8 °F (37.1 °C)] 97.8 °F (36.6 °C)  Heart Rate:  [] 84  Resp:  [11-21] 18  BP: (111-172)/()  144/84    Physical Exam:    HEENT:  neck is supple  CVS:  RRR  Lungs:  CTA - B/L  Abd:  NT/ND  Ext:  no edema  Skin:  no rashes    Pertinent Neuro Exam:  Awake alert and orient x3  No dysarthria no aphasia is  Cranial nerves II through XII intact  Moving all extremities; however, there is a positive satellite sign left upper extremity consistent with mild weakness  Deep tendon reflexes are 2+ out of 4 extremities with no pathologic reflexes  Sensory examination reveals no neglect no extinction  Coordination and gait examination show no signs of gross ataxia      Last nurse assessment:     1a. Level of Consciousness: 0-->Alert, keenly responsive  1b. LOC Questions: 0-->Answers both questions correctly  1c. LOC Commands: 0-->Performs both tasks correctly  2. Best Gaze: 0-->Normal  3. Visual: 0-->No visual loss  4. Facial Palsy: 0-->Normal symmetrical movements  5a. Motor Arm, Left: 1-->Drift, limb holds 90 (or 45) degrees, but drifts down before full 10 seconds, does not hit bed or other support  5b. Motor Arm, Right: 0-->No drift, limb holds 90 (or 45) degrees for full 10 secs  6a. Motor Leg, Left: 1-->Drift, leg falls by the end of the 5-sec period but does not hit bed  6b. Motor Leg, Right: 0-->No drift, leg holds 30 degree position for full 5 secs  7. Limb Ataxia: 1-->Present in one limb  8. Sensory: 0-->Normal, no sensory loss  9. Best Language: 0-->No aphasia, normal  10. Dysarthria: 0-->Normal  11. Extinction and Inattention (formerly Neglect): 0-->No abnormality    Total (NIH Stroke Scale): 3       Results Review:        CT of head without contrast reviewed by me.  No acute findings.  CT angiography shows no evidence of large vessel occlusion.  CT perfusion shows evidence of right MCA infarct on the Tmax only with the suggested penumbra of 6 mL.  Cerebral blood flow less than 30% is 0 suggesting only a penumbra no active ischemic regions.     Assessment/Plan     Hospital Problem List      Stroke (HCC)     Cerebrovascular accident (CVA), unspecified mechanism (HCC)    Essential hypertension    Impression:  • Right MCA infarct status post tPA on 2/6  • Hypertension    Plan:  • Continue 24-hour tPA precautions  • Repeat head CT at 24 hours  • Initiate antiplatelet therapy before midnight tonight after repeat head CT shows no signs of hemorrhagic conversion  • Clear to be moved out of the unit tonight after repeat head CT  • Systolic blood pressure goal less than 180 today  • Cardiac echo performed this morning and results are currently pending  • Patient could not tolerate MRI of brain secondary to severe claustrophobia today  • Physical, occupational, and speech therapy consultations are clear to work with the patient after lunch today  •   • 50 minutes of critical care time was performed as this was continued care of a tPA stroke patient in the intensive care unit with monitoring of BP and neuro exam.    Dayday Fay MD  02/07/23  10:56 CST

## 2023-02-07 NOTE — PLAN OF CARE
Goal Outcome Evaluation:  Plan of Care Reviewed With: patient        Progress: no change  Outcome Evaluation: OT eval complete. Pt A&Ox4 w/ wife attentive at bedside. Pt c/o of mild weakness and numbness in L distal extremities. Pt was CGA for all bed mobility and transfers, demonstrated good sitting balance. BUE ROM WFL and strength 5/5. No impairments noted in sensation. Pt demonstrated minimally decreased coordination in LUE. Vision appeared to be WNL but accuracy may be dimished due to his difficulty w/ following testing instructions. Pt often needed PT/OT to repeat instructions during eval.  He was able to amb in hallway CGA, mildly ataxic but aware of impairments. Pt often needed PT/OT to repeat instructions during eval. Pt would benefit from skilled OT to increase balance during ADLs and fxnal mobility as well as improve motor coordination. Recommend d/c home w/ assist.

## 2023-02-07 NOTE — NURSING NOTE
Pt was unable to complete the MRI even after medication was given.  He was unable to tolerate the head piece being placed on.

## 2023-02-07 NOTE — CASE MANAGEMENT/SOCIAL WORK
Discharge Planning Assessment  [facility]     Patient Name: Jaime Mckeon  MRN: [id number]  Today's Date: 2/7/2023    Admit Date: 2/6/2023        Discharge Needs Assessment     Row Name 02/07/23 0922       Living Environment    People in Home spouse    Name(s) of People in Home Sugar    Current Living Arrangements home    Primary Care Provided by spouse/significant other    Provides Primary Care For no one    Family Caregiver if Needed spouse    Family Caregiver Names Sugar    Able to Return to Prior Arrangements yes       Resource/Environmental Concerns    Resource/Environmental Concerns none       Food Insecurity    Within the past 12 months, you worried that your food would run out before you got the money to buy more. Never true    Within the past 12 months, the food you bought just didn't last and you didn't have money to get more. Never true       Transition Planning    Patient/Family Anticipates Transition to home with family    Transportation Anticipated family or friend will provide       Discharge Needs Assessment    Readmission Within the Last 30 Days no previous admission in last 30 days    Equipment Currently Used at Home none    Concerns to be Addressed discharge planning    Equipment Needed After Discharge none    Discharge Coordination/Progress spoke to patient who lives with spouse has RX coverage and PCP; independent at home prior to illness will follow PT eval and for DC needs               Discharge Plan    No documentation.               Continued Care and Services - Admitted Since 2/6/2023    Coordination has not been started for this encounter.          Demographic Summary    No documentation.                Functional Status    No documentation.                Psychosocial    No documentation.                Abuse/Neglect    No documentation.                Legal    No documentation.                Substance Abuse    No documentation.                Patient Forms    No documentation.                    Dinorah Dick, RN

## 2023-02-07 NOTE — ED PROVIDER NOTES
Subjective   History of Present Illness  This is a 72-year-old male who came to the emergency room with weakness on his left side.  Patient's symptoms began about 30 minutes prior to arrival to the emergency room.  Patient does not have a history of stroke.  Patient had no loss of consciousness.  Patient had no injury or trauma.  Patient has no slurred speech.  Patient has no confusion.  Patient has no headache or dizziness.  Patient has no chest pain or shortness of breath.  Patient denies any other symptoms. 535 pm was last known well of the patient.        Review of Systems   Neurological: Positive for weakness and numbness.   All other systems reviewed and are negative.      Past Medical History:   Diagnosis Date   • Allergic rhinitis    • Arthritis    • BPH (benign prostatic hyperplasia)    • Disease of thyroid gland    • Hyperlipidemia    • Hypertension    • Obesity        No Known Allergies    Past Surgical History:   Procedure Laterality Date   • CATARACT EXTRACTION     • COLONOSCOPY  06/27/2006    Hemorrhoids   • COLONOSCOPY N/A 02/22/2022    Procedure: COLONOSCOPY WITH ANESTHESIA;  Surgeon: Elan Davis MD;  Location: [facility] ENDOSCOPY;  Service: Gastroenterology;  Laterality: N/A;  pre: screen  post: polyps. diverticulosis.   Ernesto Swanson MD   • ENDOSCOPY  07/11/2006    Grade A esophagitis, small HH; Pt states he does not remember this   • VASECTOMY         Family History   Problem Relation Age of Onset   • Colon cancer Neg Hx    • Colon polyps Neg Hx        Social History     Socioeconomic History   • Marital status:    Tobacco Use   • Smoking status: Former   • Smokeless tobacco: Never   • Tobacco comments:     stopped 35+ years   Vaping Use   • Vaping Use: Never used   Substance and Sexual Activity   • Alcohol use: Yes     Comment: Daily wine   • Drug use: Never   • Sexual activity: Defer           Objective   Physical Exam  Vitals and nursing note reviewed.   Constitutional:        Appearance: Normal appearance.   HENT:      Head: Normocephalic and atraumatic.      Mouth/Throat:      Mouth: Mucous membranes are moist.   Eyes:      Extraocular Movements: Extraocular movements intact.      Pupils: Pupils are equal, round, and reactive to light.   Cardiovascular:      Rate and Rhythm: Normal rate and regular rhythm.      Heart sounds: Normal heart sounds.   Pulmonary:      Breath sounds: Normal breath sounds.   Abdominal:      General: Bowel sounds are normal.      Palpations: Abdomen is soft.      Tenderness: There is no abdominal tenderness.   Musculoskeletal:      Cervical back: Normal range of motion and neck supple.   Skin:     General: Skin is warm and dry.   Neurological:      Mental Status: He is alert.      GCS: GCS eye subscore is 4. GCS verbal subscore is 5. GCS motor subscore is 6.      Cranial Nerves: No cranial nerve deficit.      Motor: Weakness present.      Coordination: Finger-Nose-Finger Test abnormal and Heel to Shin Test abnormal.   Psychiatric:         Mood and Affect: Mood normal.         Behavior: Behavior normal.         Procedures           ED Course  ED Course as of 02/06/23 2035   Mon Feb 06, 2023   1827 NIH Stroke Scale/Score (NIHSS) - MDCalc  Calculated on Feb 06 2023 8:02 PM  5 points -> NIH Stroke Scale [RP]   2034 EKG rate 95  Normal sinus rhythm     [RP]      ED Course User Index  [RP] Phil Morel MD                                         Labs Reviewed   COMPREHENSIVE METABOLIC PANEL - Abnormal; Notable for the following components:       Result Value    Glucose 118 (*)     BUN 24 (*)     All other components within normal limits    Narrative:     GFR Normal >60  Chronic Kidney Disease <60  Kidney Failure <15    The GFR formula is only valid for adults with stable renal function between ages 18 and 70.   APTT - Abnormal; Notable for the following components:    PTT 23.7 (*)     All other components within normal limits   CK - Abnormal; Notable for the  following components:    Creatine Kinase 253 (*)     All other components within normal limits   CBC WITH AUTO DIFFERENTIAL - Abnormal; Notable for the following components:    Monocyte % 12.2 (*)     Monocytes, Absolute 0.98 (*)     All other components within normal limits   PROTIME-INR - Normal   URINALYSIS W/ MICROSCOPIC IF INDICATED (NO CULTURE) - Normal    Narrative:     Urine microscopic not indicated.   TROPONIN (IN-HOUSE) - Normal    Narrative:     Troponin T Reference Range:  <= 0.03 ng/mL-   Negative for AMI  >0.03 ng/mL-     Abnormal for myocardial necrosis.  Clinicians would have to utilize clinical acumen, EKG, Troponin and serial changes to determine if it is an Acute Myocardial Infarction or myocardial injury due to an underlying chronic condition.       Results may be falsely decreased if patient taking Biotin.     C-REACTIVE PROTEIN - Normal   SEDIMENTATION RATE - Normal   MAGNESIUM - Normal   POCT GLUCOSE FINGERSTICK - Normal   RAINBOW DRAW    Narrative:     The following orders were created for panel order [facility] Draw.  Procedure                               Abnormality         Status                     ---------                               -----------         ------                     Red Top[[id number]]                                          Final result               Nicole Top[[id number]]                                         In process                   Please view results for these tests on the individual orders.   TYPE AND SCREEN   CBC AND DIFFERENTIAL    Narrative:     The following orders were created for panel order CBC & Differential.  Procedure                               Abnormality         Status                     ---------                               -----------         ------                     CBC Auto Differential[[id number]]        Abnormal            Final result                 Please view results for these tests on the individual orders.   RED TOP   GRAY TOP      CT CEREBRAL PERFUSION WITH & WITHOUT CONTRAST   Final Result   Impression:   1. No evidence for acute intracranial large vessel occlusion. No   discrete infarct detected by the perfusion software in terms of CBF   abnormality.    This report was finalized on 02/06/2023 19:18 by Dr Luis Armando Limon, .      CT Angiogram Neck   Final Result       1. No arterial occlusion or flow-limiting stenosis in the neck.   2. No intracranial large vessel occlusion.    This report was finalized on 02/06/2023 19:14 by Dr Luis Armando Limon, .      CT Angiogram Head w AI Analysis of LVO   Final Result       1. No arterial occlusion or flow-limiting stenosis in the neck.   2. No intracranial large vessel occlusion.    This report was finalized on 02/06/2023 19:14 by Dr Luis Armando Limon, .      CT Head Without Contrast Stroke Protocol   Final Result   Impression:     1. No acute intracranial process.   This report was finalized on 02/06/2023 18:47 by Dr Luis Armando Limon, .          Medical Decision Making  This was a 72-year-old male who came in with an NIHSS score of 5.  Patient's score continue to improve as he was here.  Patient was having left-sided ataxia as well as weakness/drift.  Dr. Fay neurologist was consulted on the patient.  Has he evaluated the patient patient had an NIHSS score of 0.  Patient was given tPA.  Patient was admitted to the ICU for further evaluation.    Amount and/or Complexity of Data Reviewed  Labs: ordered. Decision-making details documented in ED Course.  Radiology: ordered. Decision-making details documented in ED Course.  ECG/medicine tests: ordered. Decision-making details documented in ED Course.      Risk  Prescription drug management.          Final diagnoses:   Cerebrovascular accident (CVA), unspecified mechanism (HCC)       ED Disposition  ED Disposition     ED Disposition   Decision to Admit    Condition   --    Comment   Level of Care: Critical Care [6]   Diagnosis: Cerebrovascular accident (CVA),  unspecified mechanism ([facility]) [[id number]]   Admitting Physician: CHARBEL MATA [[id number]]   Attending Physician: CHARBEL MATA [[id number]]   Certification: I Certify That Inpatient Hospital Services Are Medically Necessary For Greater Than 2 Midnights               No follow-up provider specified.       Medication List      No changes were made to your prescriptions during this visit.          Phil Morel MD  02/06/23 2032

## 2023-02-07 NOTE — H&P
Date of Admission: 2/6/2023  Primary Care Physician: Ernesto Swanson MD    Subjective     Chief Complaint: Left arm weakness    History of Present Illness  Patient is 72-year-old with history of hypertension who been usual state of health yesterday.  He had just cut off the phone and noted some feelings of weakness in his left arm.  He did testing on himself and looked in the mirror and states his face was symmetric but that his left arm was drifting.  He also felt a little bit weak in his left leg.  He was home by himself.  He called 911 and was brought by EMS to the emergency department.  He did have some improvement in symptoms but had some residual.  He was seen in evaluation by neurology and agreed to tPA administration.  This morning he notes mild drift in his left arm still.  Everything else feels normal.  He is passed a swallowing evaluation.  He has not been checking blood pressure at home but it has been well controlled in the office.  He does not take aspirin.  He has no history of A-fib.  He has had excellent lipids and blood sugar.  He has no significant smoking history.        Review of Systems   No chest pain or heart palpitation.  No headache.  No visual change or speech difficulty.  Otherwise complete ROS reviewed and negative except as mentioned in the HPI.      Past Medical History:   Past Medical History:   Diagnosis Date   • Allergic rhinitis    • Arthritis    • BPH (benign prostatic hyperplasia)    • Disease of thyroid gland    • Hyperlipidemia    • Hypertension    • Macular degeneration, wet (HCC)     wet in right  dry in left   • Obesity        Past Surgical History:  Past Surgical History:   Procedure Laterality Date   • CATARACT EXTRACTION     • COLONOSCOPY  06/27/2006    Hemorrhoids   • COLONOSCOPY N/A 02/22/2022    Procedure: COLONOSCOPY WITH ANESTHESIA;  Surgeon: Elan Davis MD;  Location: [facility] ENDOSCOPY;  Service: Gastroenterology;  Laterality: N/A;  pre: screen  post:  "polyps. diverticulosis.   Ernesto Swanson MD   • ENDOSCOPY  07/11/2006    Grade A esophagitis, small HH; Pt states he does not remember this   • VASECTOMY         Social History:  reports that he has quit smoking. He has never used smokeless tobacco. He reports current alcohol use. He reports that he does not use drugs.    Family History: family history is not on file.       Allergies:  No Known Allergies    Medications:  Prior to Admission medications    Medication Sig Start Date End Date Taking? Authorizing Provider   amLODIPine (NORVASC) 10 MG tablet TAKE 1 TABLET BY MOUTH ONCE DAILY.   Yes Zac Bell MD   atorvastatin (LIPITOR) 40 MG tablet Take 40 mg by mouth Daily.   Yes Zac Bell MD   levothyroxine (SYNTHROID, LEVOTHROID) 25 MCG tablet TAKE 1 TABLET BY MOUTH ONCE DAILY.   Yes Zac Bell MD   losartan (COZAAR) 100 MG tablet TAKE 1 TABLET BY MOUTH ONCE DAILY   Yes Zac Bell MD   Multiple Vitamins-Minerals (PRESERVISION AREDS 2 PO) Take 1 tablet by mouth 2 (Two) Times a Day.   Yes Zac Bell MD   sildenafil (VIAGRA) 100 MG tablet Viagra 100 mg tablet   Take 1 tablet every day by oral route.    Zac Bell MD       Objective     Vital Signs: /90 (BP Location: Right arm, Patient Position: Lying)   Pulse 73   Temp 97.8 °F (36.6 °C) (Oral)   Resp 16   Ht 180.3 cm (71\")   Wt 110 kg (243 lb 9.7 oz)   SpO2 97%   BMI 33.98 kg/m²   Physical Exam  Vitals reviewed.   Constitutional:       Appearance: Normal appearance.   HENT:      Head: Normocephalic and atraumatic.   Eyes:      General: No scleral icterus.  Cardiovascular:      Rate and Rhythm: Normal rate and regular rhythm.      Pulses: Normal pulses.      Heart sounds: Normal heart sounds.   Pulmonary:      Effort: Pulmonary effort is normal. No respiratory distress.      Breath sounds: Normal breath sounds. No wheezing or rales.   Abdominal:      General: Abdomen is flat. Bowel " sounds are normal. There is no distension.      Palpations: Abdomen is soft.      Tenderness: There is no abdominal tenderness. There is no guarding.   Musculoskeletal:      Right lower leg: No edema.      Left lower leg: No edema.   Skin:     General: Skin is warm and dry.   Neurological:      Mental Status: He is alert and oriented to person, place, and time.      Cranial Nerves: No cranial nerve deficit.      Motor: Weakness (Minimal drift of the left arm) present.   Psychiatric:         Mood and Affect: Mood normal.         Behavior: Behavior normal.             Results Reviewed:  Lab Results (last 24 hours)     Procedure Component Value Units Date/Time    Lipid Panel [[id number]] Collected: 02/07/23 0420    Specimen: Blood Updated: 02/07/23 0515     Total Cholesterol 132 mg/dL      Triglycerides 103 mg/dL      HDL Cholesterol 44 mg/dL      LDL Cholesterol  69 mg/dL      VLDL Cholesterol 19 mg/dL      LDL/HDL Ratio 1.53    Narrative:      Cholesterol Reference Ranges  (U.S. Department of Health and Human Services ATP III Classifications)    Desirable          <200 mg/dL  Borderline High    200-239 mg/dL  High Risk          >240 mg/dL      Triglyceride Reference Ranges  (U.S. Department of Health and Human Services ATP III Classifications)    Normal           <150 mg/dL  Borderline High  150-199 mg/dL  High             200-499 mg/dL  Very High        >500 mg/dL    HDL Reference Ranges  (U.S. Department of Health and Human Services ATP III Classifications)    Low     <40 mg/dl (major risk factor for CHD)  High    >60 mg/dl ('negative' risk factor for CHD)        LDL Reference Ranges  (U.S. Department of Health and Human Services ATP III Classifications)    Optimal          <100 mg/dL  Near Optimal     100-129 mg/dL  Borderline High  130-159 mg/dL  High             160-189 mg/dL  Very High        >189 mg/dL    Hemoglobin A1c [[id number]]  (Normal) Collected: 02/07/23 0420    Specimen: Blood Updated: 02/07/23 0503      Hemoglobin A1C 5.50 %     Narrative:      Hemoglobin A1C Ranges:    Increased Risk for Diabetes  5.7% to 6.4%  Diabetes                     >= 6.5%  Diabetic Goal                < 7.0%    [place] Draw [[id number]] Collected: 02/06/23 1820    Specimen: Blood Updated: 02/06/23 2230    Narrative:      The following orders were created for panel order [place] Draw.  Procedure                               Abnormality         Status                     ---------                               -----------         ------                     Red Top[[id number]]                                          Final result               Nicole Top[[id number]]                                         Final result                 Please view results for these tests on the individual orders.    Gray Top [[id number]] Collected: 02/06/23 1830    Specimen: Blood Updated: 02/06/23 2230     Extra Tube Hold for add-ons.     Comment: Auto resulted.       Urinalysis With Microscopic If Indicated (No Culture) - Urine, Clean Catch [[id number]]  (Normal) Collected: 02/06/23 1953    Specimen: Urine, Clean Catch Updated: 02/06/23 2012     Color, UA Yellow     Appearance, UA Clear     pH, UA 7.0     Specific Gravity, UA >=1.030     Glucose, UA Negative     Ketones, UA Negative     Bilirubin, UA Negative     Blood, UA Negative     Protein, UA Negative     Leuk Esterase, UA Negative     Nitrite, UA Negative     Urobilinogen, UA 0.2 E.U./dL    Narrative:      Urine microscopic not indicated.    Red Top [[id number]] Collected: 02/06/23 1820    Specimen: Blood Updated: 02/06/23 1930     Extra Tube Hold for add-ons.     Comment: Auto resulted.       C-reactive Protein [[id number]]  (Normal) Collected: 02/06/23 1820    Specimen: Blood Updated: 02/06/23 1920     C-Reactive Protein <0.30 mg/dL     Comprehensive Metabolic Panel [[id number]]  (Abnormal) Collected: 02/06/23 1820    Specimen: Blood Updated: 02/06/23 1917     Glucose 118 mg/dL      BUN 24 mg/dL       Creatinine 1.05 mg/dL      Sodium 142 mmol/L      Potassium 3.9 mmol/L      Comment: Slight hemolysis detected by analyzer. Results may be affected.        Chloride 107 mmol/L      CO2 23.0 mmol/L      Calcium 9.4 mg/dL      Total Protein 7.4 g/dL      Albumin 4.7 g/dL      ALT (SGPT) 26 U/L      AST (SGOT) 28 U/L      Alkaline Phosphatase 115 U/L      Total Bilirubin 0.2 mg/dL      Globulin 2.7 gm/dL      A/G Ratio 1.7 g/dL      BUN/Creatinine Ratio 22.9     Anion Gap 12.0 mmol/L      eGFR 75.9 mL/min/1.73     Narrative:      GFR Normal >60  Chronic Kidney Disease <60  Kidney Failure <15    The GFR formula is only valid for adults with stable renal function between ages 18 and 70.    CK [[id number]]  (Abnormal) Collected: 02/06/23 1820    Specimen: Blood Updated: 02/06/23 1916     Creatine Kinase 253 U/L     Troponin [[id number]]  (Normal) Collected: 02/06/23 1820    Specimen: Blood Updated: 02/06/23 1913     Troponin T <0.010 ng/mL     Narrative:      Troponin T Reference Range:  <= 0.03 ng/mL-   Negative for AMI  >0.03 ng/mL-     Abnormal for myocardial necrosis.  Clinicians would have to utilize clinical acumen, EKG, Troponin and serial changes to determine if it is an Acute Myocardial Infarction or myocardial injury due to an underlying chronic condition.       Results may be falsely decreased if patient taking Biotin.      Magnesium [[id number]]  (Normal) Collected: 02/06/23 1820    Specimen: Blood Updated: 02/06/23 1911     Magnesium 2.0 mg/dL     Sedimentation Rate [[id number]]  (Normal) Collected: 02/06/23 1820    Specimen: Blood Updated: 02/06/23 1906     Sed Rate 8 mm/hr     Protime-INR [[id number]]  (Normal) Collected: 02/06/23 1830    Specimen: Blood Updated: 02/06/23 1902     Protime 13.5 Seconds      INR 1.02    aPTT [[id number]]  (Abnormal) Collected: 02/06/23 1830    Specimen: Blood Updated: 02/06/23 1902     PTT 23.7 seconds     CBC & Differential [[id number]]  (Abnormal) Collected: 02/06/23 1820     Specimen: Blood Updated: 02/06/23 1853    Narrative:      The following orders were created for panel order CBC & Differential.  Procedure                               Abnormality         Status                     ---------                               -----------         ------                     CBC Auto Differential[[id number]]        Abnormal            Final result                 Please view results for these tests on the individual orders.    CBC Auto Differential [[id number]]  (Abnormal) Collected: 02/06/23 1820    Specimen: Blood Updated: 02/06/23 1853     WBC 8.02 10*3/mm3      RBC 4.40 10*6/mm3      Hemoglobin 13.3 g/dL      Hematocrit 40.6 %      MCV 92.3 fL      MCH 30.2 pg      MCHC 32.8 g/dL      RDW 13.4 %      RDW-SD 45.8 fl      MPV 11.1 fL      Platelets 243 10*3/mm3      Neutrophil % 46.0 %      Lymphocyte % 38.4 %      Monocyte % 12.2 %      Eosinophil % 2.5 %      Basophil % 0.7 %      Immature Grans % 0.2 %      Neutrophils, Absolute 3.68 10*3/mm3      Lymphocytes, Absolute 3.08 10*3/mm3      Monocytes, Absolute 0.98 10*3/mm3      Eosinophils, Absolute 0.20 10*3/mm3      Basophils, Absolute 0.06 10*3/mm3      Immature Grans, Absolute 0.02 10*3/mm3      nRBC 0.0 /100 WBC     POC Glucose Once [[id number]]  (Normal) Collected: 02/06/23 1830    Specimen: Blood Updated: 02/06/23 1841     Glucose 115 mg/dL      Comment: : [id number] Melvin SchwarzMary ID: [id number]           Imaging Results (Last 24 Hours)     Procedure Component Value Units Date/Time    CT CEREBRAL PERFUSION WITH & WITHOUT CONTRAST [[id number]] Collected: 02/06/23 1914     Updated: 02/06/23 1921    Narrative:      CT CEREBRAL PERFUSION W WO CONTRAST- 2/6/2023 6:42 PM CST     HISTORY: Transient ischemic attack (TIA)     Technique:      1. Perfusion CT is performed to acquire images tracking the temporal  course of iodinated contrast material passing through the cerebral  circulation. Perfusion parameters, such as cerebral  blood flow (CBF),  cerebral blood volume (CBV), mean transit time (MTT), etc. are  calculated by [employer] with additional provided perfusion maps and  estimated stroke volumes.   2. Automated exposure control (AEC) protocols are utilized on the  scanner to ensure dose lowered technique.      Comparison: Noncontrast CT brain and brain angiogram dated 2/6/2023 6:42  PM CST     CT dose: DLP  1631  mGycm     Findings:     Small focus of Tmax abnormality in the right basal ganglia,  approximately 6 mL. No corresponding CBF abnormality.     Tmax >6.0 seconds volume: 6 ml     CBF < 30% volume: 0 ml     Mismatch volume: 6 ml      Mismatch ratio: Infinite       Impression:      Impression:  1. No evidence for acute intracranial large vessel occlusion. No  discrete infarct detected by the perfusion software in terms of CBF  abnormality.   This report was finalized on 02/06/2023 19:18 by Dr Luis Armando Limon, .    CT Angiogram Head w AI Analysis of LVO [[id number]] Collected: 02/06/23 1908     Updated: 02/06/23 1917    Narrative:      EXAM: CT ANGIOGRAM HEAD W AI ANALYSIS OF LVO-, CT ANGIOGRAM NECK- -  2/6/2023 6:35 PM CST     HISTORY: Acute Stroke       COMPARISON: CT scan dated 02/06/2023.      DOSE LENGTH PRODUCT: 223 mGy cm. Automated exposure control was also  utilized to decrease patient radiation dose.     TECHNIQUE: CTA head and neck performed with intravenous contrast. 3D  postprocessing, including MIPs, performed and images saved to PACS. AI  ANALYSIS OF LVO WAS UTILIZED.  Grading of carotid stenosis performed  with NASCET criteria.     FINDINGS:      There is a typical three-vessel branching pattern off the aortic arch  without significant ostial narrowing. Common carotid arteries are patent  and without flow-limiting stenosis. Calcified plaque in the carotid  bifurcations without flow-limiting stenosis. Internal carotid arteries  are normal in caliber in the neck. Right ICA is notably tortuous just  below the skull base.  The vertebral arteries originate from the  subclavian arteries without significant ostial narrowing.  No evidence  of vertebral artery dissection or pseudoaneurysm.     Distal ICAs are patent. There is a mild atheromatous narrowing at the  anterior genu of the right distal ICA. No intracranial large vessel  occlusion. Anterior and middle cerebral arteries are patent and without  flow-limiting stenosis. Intracranial vertebral arteries and basilar  artery are normal in caliber. Posterior cerebral arteries are patent and  normal in caliber. No visualized aneurysm or vascular malformation.      No intracranial hemorrhage or mass effect. Orbital contents are  unremarkable. No cervical adenopathy. Lung apices are clear.        Impression:         1. No arterial occlusion or flow-limiting stenosis in the neck.  2. No intracranial large vessel occlusion.   This report was finalized on 02/06/2023 19:14 by Dr Luis Armando Limon, .    CT Angiogram Neck [[id number]] Collected: 02/06/23 1908     Updated: 02/06/23 1917    Narrative:      EXAM: CT ANGIOGRAM HEAD W AI ANALYSIS OF LVO-, CT ANGIOGRAM NECK- -  2/6/2023 6:35 PM CST     HISTORY: Acute Stroke       COMPARISON: CT scan dated 02/06/2023.      DOSE LENGTH PRODUCT: 223 mGy cm. Automated exposure control was also  utilized to decrease patient radiation dose.     TECHNIQUE: CTA head and neck performed with intravenous contrast. 3D  postprocessing, including MIPs, performed and images saved to PACS. AI  ANALYSIS OF LVO WAS UTILIZED.  Grading of carotid stenosis performed  with NASCET criteria.     FINDINGS:      There is a typical three-vessel branching pattern off the aortic arch  without significant ostial narrowing. Common carotid arteries are patent  and without flow-limiting stenosis. Calcified plaque in the carotid  bifurcations without flow-limiting stenosis. Internal carotid arteries  are normal in caliber in the neck. Right ICA is notably tortuous just  below the skull base.  The vertebral arteries originate from the  subclavian arteries without significant ostial narrowing.  No evidence  of vertebral artery dissection or pseudoaneurysm.     Distal ICAs are patent. There is a mild atheromatous narrowing at the  anterior genu of the right distal ICA. No intracranial large vessel  occlusion. Anterior and middle cerebral arteries are patent and without  flow-limiting stenosis. Intracranial vertebral arteries and basilar  artery are normal in caliber. Posterior cerebral arteries are patent and  normal in caliber. No visualized aneurysm or vascular malformation.      No intracranial hemorrhage or mass effect. Orbital contents are  unremarkable. No cervical adenopathy. Lung apices are clear.        Impression:         1. No arterial occlusion or flow-limiting stenosis in the neck.  2. No intracranial large vessel occlusion.   This report was finalized on 02/06/2023 19:14 by Dr Luis Armando Limon, .    CT Head Without Contrast Stroke Protocol [[id number]] Collected: 02/06/23 1845     Updated: 02/06/23 1850    Narrative:      CT HEAD WO CONTRAST STROKE PROTOCOL- 2/6/2023 6:35 PM CST     HISTORY: Transient ischemic attack (TIA)       DOSE LENGTH PRODUCT: 820 mGy cm. Automated exposure control was also  utilized to decrease patient radiation dose.     Technique:   Axial CT of the brain without IV contrast. Sagittal and coronal  reformations are also provided for review. Soft tissue and bone kernels  are available for interpretation.     Comparison: None.     Findings:      There is no evidence of acute large vascular territory infarct. No  intra-axial or extra-axial hemorrhage. No visualized mass lesion or mass  effect. The ventricles, cortical sulci and basal cisterns are symmetric  and age appropriate.  Posterior fossa structures are unremarkable. The  scalp and calvarium are intact. Visualized paranasal sinuses and  mastoids are clear.        Impression:      Impression:    1. No acute intracranial  process.  This report was finalized on 02/06/2023 18:47 by Dr Luis Armando Limon, .            Assessment / Plan     Assessment & Plan  Active Hospital Problems    Diagnosis    • **Stroke (HCC)    • Essential hypertension    • Cerebrovascular accident (CVA), unspecified mechanism (HCC)      Patient with acute CVA with left-sided weakness now status post tPA.  He has well-controlled hypertension, normal lipids, normal blood sugars,smoking history, and no evidence of carotid disease on CTA.    1.  Cardiac monitoring and echo to evaluate for cardioembolic source  2.  Antiplatelet or anticoagulation per neurology  3.  Continue home antihypertensives  4.  Agree with statin therapy.  Discussed with patient indications for this despite excellent lipids  5.  Patient states he is very claustrophobic and will need something to calm him for MRI.  We will order some IV Ativan      Ernesto Swanson MD   02/07/23   08:05 CST

## 2023-02-07 NOTE — PLAN OF CARE
Goal Outcome Evaluation:  Plan of Care Reviewed With: patient, spouse           Outcome Evaluation: PT eval complete. He is alert and oriented x 4. Spouse in room during assessment. He reports being independent and working prior to this admit. He demos equal B LE strength, AROM and sensation to testing. Demos mild ataxia in L LE with heel to shin testing and with gait. He is aware of the coordination deficit with gait and was able to focus with his L LE steps. He ambulates 200 ft around the unit. He and his spouse report much improvement since TPA. PT will cont with gait and coordination training. Anticipate he will d.c home w spouse, consider outpatient PT if needed for L LE coordination.

## 2023-02-07 NOTE — PLAN OF CARE
Goal Outcome Evaluation:         Pt arrived to unit 2058, NIH at that time was a 0. At 2330 pt stated before Q15 neuro exam that he felt different, slight tingling in LUE and LLE. Small drift in both of those extremities, no changes in vitals or orientation.  strength/dorsiflexion still strong. Face was symmetrical, tongue midline, speech normal. Dr Fay returned call, did not want to pursue further imaging due to change being small. LUE drift remained through rest of shift, LLE drift seemed to resolve. Pt remained on bedrest, voided per urinal with good u/o. IV labetalol not started due to B/P being within parameters.

## 2023-02-07 NOTE — THERAPY EVALUATION
Acute Care - Speech Language Pathology Initial Evaluation  [facility]     Patient Name: Jaime Mckeon  : 1950  MRN: [id number]  Today's Date: 2023               Admit Date: 2023     Visit Dx:  Patient presents with left hemiparesis secondary to stroke. Pt is alert, oriented, and highly cooperative. There are no complaints of pain. ST assessed the pt's communication and cognitive levels. There are no signs of any communicative deficits. Executive function, reasoning, and memory are WNL. Patient's spouse states that he is at baseline aside from left sided muscle weakness. Pt does not meet requirements for skilled intervention at this time. Contact ST if any acute changes occur.     Completed by Avel Green, SLP Student     Hailey Ray, MS CCC-SLP 2023 10:45 CST      ICD-10-CM ICD-9-CM   1. Cerebrovascular accident (CVA), unspecified mechanism (HCC)  I63.9 434.91   2. At risk for impaired communication  Z91.89 V15.89     Patient Active Problem List   Diagnosis    Encounter for screening for malignant neoplasm of colon    Stroke (HCC)    Cerebrovascular accident (CVA), unspecified mechanism (HCC)    Essential hypertension     Past Medical History:   Diagnosis Date    Allergic rhinitis     Arthritis     BPH (benign prostatic hyperplasia)     Disease of thyroid gland     Hyperlipidemia     Hypertension     Macular degeneration, wet (HCC)     wet in right  dry in left    Obesity      Past Surgical History:   Procedure Laterality Date    CATARACT EXTRACTION      COLONOSCOPY  2006    Hemorrhoids    COLONOSCOPY N/A 2022    Procedure: COLONOSCOPY WITH ANESTHESIA;  Surgeon: Elan Davis MD;  Location: [facility] ENDOSCOPY;  Service: Gastroenterology;  Laterality: N/A;  pre: screen  post: polyps. diverticulosis.   Ernesto Swanson MD    ENDOSCOPY  2006    Grade A esophagitis, small HH; Pt states he does not remember this    VASECTOMY         SLP Recommendation and Plan        Reason  for Discharge: all goals and outcomes met, no further needs identified (02/07/23 1042)        Anticipated Discharge Disposition (SLP): unknown (02/07/23 1042)                                          SLP EVALUATION (last 72 hours)       SLP SLC Evaluation       Row Name 02/07/23 0840                   Communication Assessment/Intervention    Document Type evaluation  -MG (r) JH (t) MG (c)        Subjective Information no complaints  -MG (r) JH (t) MG (c)        Patient Observations alert;cooperative  -MG (r) JH (t) MG (c)        Patient Effort excellent  -MG (r) JH (t) MG (c)        Symptoms Noted During/After Treatment none  -MG (r) JH (t) MG (c)           General Information    Patient Profile Reviewed yes  -MG (r) JH (t) MG (c)        Pertinent History Of Current Problem Stroke, left hemiparesis  -MG (r) JH (t) MG (c)        Precautions/Limitations, Vision WFL  -MG (r) JH (t) MG (c)        Precautions/Limitations, Hearing WFL  -MG (r) JH (t) MG (c)        Prior Level of Function-Communication WFL  -MG (r) JH (t) MG (c)        Plans/Goals Discussed with patient;spouse/S.O.  -MG (r) JH (t) MG (c)        Barriers to Rehab none identified  -MG (r) JH (t) MG (c)        Patient's Goals for Discharge return to home  -MG (r) JH (t) MG (c)           Comprehension Assessment/Intervention    Comprehension Assessment/Intervention Auditory Comprehension  -MG           Auditory Comprehension Assessment/Intervention    Auditory Comprehension (Communication) WFL  -MG           Expression Assessment/Intervention    Expression Assessment/Intervention verbal expression  -MG           Verbal Expression Assessment/Intervention    Verbal Expression WFL  -MG           Motor Speech Assessment/Intervention    Motor Speech Function WNL  -MG (r) JH (t) MG (c)           Cognitive Assessment Intervention- SLP    Cognitive Function (Cognition) WNL  -MG (r) JH (t) MG (c)        Orientation Status (Cognition) WNL  -MG (r) JH (t) MG (c)         Memory (Cognitive) WNL  -MG (r) JH (t) MG (c)        Attention (Cognitive) WNL  -MG (r) JH (t) MG (c)        Thought Organization (Cognitive) WNL  -MG (r) JH (t) MG (c)        Reasoning (Cognitive) WNL  -MG (r) JH (t) MG (c)        Problem Solving (Cognitive) WNL  -MG (r) JH (t) MG (c)        Executive Function (Cognition) WNL  -MG (r) JH (t) MG (c)        Pragmatics (Communication) WNL  -MG (r) JH (t) MG (c)        Right Hemisphere Function WNL  -MG (r) JH (t) MG (c)           SLP Evaluation Clinical Impressions    SLP Diagnosis functional speech/language skills;functional cognitive-linguistic skills  -MG (r) JH (t) MG (c)        Rehab Potential/Prognosis excellent  -MG (r) JH (t) MG (c)        SLC Criteria for Skilled Therapy Interventions Met no problems identified which require skilled intervention  -MG (r) JH (t) MG (c)        Functional Impact no impact on function  -MG (r) JH (t) MG (c)           Recommendations    Therapy Frequency (SLP SLC) evaluation only  -MG (r) JH (t) MG (c)           Communication Treatment Objective and Progress Goals (SLP)    SLC LTGs --  -MG                  User Key  (r) = Recorded By, (t) = Taken By, (c) = Cosigned By      Initials Name Effective Dates    MG Hailey Ray, MS CCC-SLP 08/12/22 -     Avel Alicea, Speech Therapy Student 12/12/22 -                        EDUCATION  The patient has been educated in the following areas:     Cognitive Impairment Communication Impairment.                      Time Calculation:      Time Calculation- SLP       Row Name 02/07/23 0907             Time Calculation- SLP    SLP Start Time 0840  -MG (r) JH (t) MG (c)      SLP Stop Time 0907  -MG (r) JH (t) MG (c)      SLP Time Calculation (min) 27 min  -MG (r) JH (t)      SLP Received On 02/07/23  -MG (r) JH (t) MG (c)         Untimed Charges    SLP Eval/Re-eval  ST Eval Speech and Production w/ Language - 21084  -MG (r) JH (t) MG (c)      20072-CW Eval Speech and Production w/ Language  Minutes 27  -MG         Total Minutes    Untimed Charges Total Minutes 27  -MG       Total Minutes 27  -MG                User Key  (r) = Recorded By, (t) = Taken By, (c) = Cosigned By      Initials Name Provider Type    Hailey Kowalski, MS CCC-SLP Speech and Language Pathologist    Avel Alicea, Speech Therapy Student SLP Student                    Therapy Charges for Today       Code Description Service Date Service Provider Modifiers Qty    [id number]  ST EVAL SPEECH AND PROD W LANG  2 2/7/2023 Hailey Ray MS CCC-SLP GN 1                       Hailey Ray MS CCC-SLP  2/7/2023

## 2023-02-07 NOTE — CONSULTS
Neurology Consult Note    Referring Provider: Dr. Morel ( ER)  Reason for Consultation: CODE STROKE      History of present illness:      72-year-old male with a history of hypertension who presents with a last seen normal time of 5:30 PM.  He had an acute onset of left arm and leg weakness with some sensory deficits as well.  He presented to the ER but his symptoms were resolving rapidly.  He initially was a code stroke as he had no physical deficits.  He had routine imaging including a CT of his head without contrast which was unremarkable.  He also had a CT angiography and CT perfusion.  When he arrived back to the ER for reexamination he was found to have NIH of 5.  At that time we discussed the risks and benefits of tPA with the patient.  We did discuss that since he was improving rapidly he would be a candidate not to pursue tPA if he wanted to.  Hearing the risks and benefits of tPA he decided to proceed with IV tPA therapy.  There is no evidence of a large vessel occlusion on CT angiography.    Past Medical History:   Diagnosis Date   • Allergic rhinitis    • Arthritis    • BPH (benign prostatic hyperplasia)    • Disease of thyroid gland    • Hyperlipidemia    • Hypertension    • Obesity        No Known Allergies  No current facility-administered medications on file prior to encounter.     Current Outpatient Medications on File Prior to Encounter   Medication Sig   • amLODIPine (NORVASC) 10 MG tablet TAKE 1 TABLET BY MOUTH ONCE DAILY.   • atorvastatin (LIPITOR) 40 MG tablet Take 40 mg by mouth Daily.   • levothyroxine (SYNTHROID, LEVOTHROID) 25 MCG tablet TAKE 1 TABLET BY MOUTH ONCE DAILY.   • losartan (COZAAR) 100 MG tablet TAKE 1 TABLET BY MOUTH ONCE DAILY   • Multiple Vitamins-Minerals (PRESERVISION AREDS 2 PO) Take 1 tablet by mouth 2 (Two) Times a Day.   • sildenafil (VIAGRA) 100 MG tablet Viagra 100 mg tablet   Take 1 tablet every day by oral route.   • [DISCONTINUED] azelastine (ASTEPRO) 0.15  % solution nasal spray USE 1 SPRAY INTO EACH NOSTRIL TWICE DAILY       Social History     Socioeconomic History   • Marital status:    Tobacco Use   • Smoking status: Former   • Smokeless tobacco: Never   • Tobacco comments:     stopped 35+ years   Vaping Use   • Vaping Use: Never used   Substance and Sexual Activity   • Alcohol use: Yes     Comment: Daily wine   • Drug use: Never   • Sexual activity: Defer     Family History   Problem Relation Age of Onset   • Colon cancer Neg Hx    • Colon polyps Neg Hx            Vital Signs   Temp:  [98.8 °F (37.1 °C)] 98.8 °F (37.1 °C)  Heart Rate:  [] 87  Resp:  [15-16] 16  BP: (138-172)/() 143/83      Neurologic Exam:  NIHSS:  0  General Exam:  Head:  Normocephalic, atraumatic  HEENT:  Neck supple  Fundoscopic Exam:  No signs of disc edema  CVS:  Regular rate and rhythm.  No murmurs  Carotid Examination:  No bruits  Lungs:  Clear to auscultation  Abdomen:  Nontender, Nondistended  Extremities:  No signs of peripheral edema  Skin:  No rashes    Neurologic Exam:    Mental Status:    -Awake, Alert, Oriented X 3  -No word finding difficulties  -No aphasia  -No dysarthria  -Follows simple and complex commands    CN II:  Visual fields full.  Pupils equally reactive to light  CN III, IV, VI:  Extraocular Muscles full with no signs of nystagmus  CN V:  Facial sensory is symmetric with no asymmetries.  CN VII:  Facial motor symmetric  CN VIII:  Gross hearing intact bilaterally  CN IX:  Palate elevates symmetrically  CN X:  Palate elevates symmetrically  CN XI:  Shoulder shrug symmetric  CN XII:  Tongue is midline on protrusion    Motor: (strength out of 5:  1= minimal movement, 2 = movement in plane of gravity, 3 = movement against gravity, 4 = movement against some resistance, 5 = full strength)    -Right Upper Ext: Proximal: 5 Distal: 5  -Left Upper Ext: Proximal: 5 Distal: 5    -Right Lower Ext: Proximal: 5 Distal: 5  -Left Lower Ext: Proximal: 5 Distal:  5    DTR:  -Right   Biceps: 2+ Triceps: 2+ Brachioradialis: 2+   Patella: 2+ Ankle: 2+ Neg Babinski  -Left   Biceps: 2+ Triceps: 2+ Brachioradialis: 2+   Patella: 2+ Ankle: 2+ Neg Babinski    Sensory:  -Intact to light touch, pinprick, temperature, pain, and proprioception    Coordination:  -Finger to nose intact  -Heel to shin intact  -No ataxia    Gait  -No signs of ataxia  -ambulates unassisted        Results Review:  Lab Results (last 24 hours)     Procedure Component Value Units Date/Time    [place] Draw [[id number]] Collected: 02/06/23 1820    Specimen: Blood Updated: 02/06/23 1930    Narrative:      The following orders were created for panel order [place] Draw.  Procedure                               Abnormality         Status                     ---------                               -----------         ------                     Red Top[[id number]]                                          Final result               Nicole Top[[id number]]                                         In process                   Please view results for these tests on the individual orders.    Red Top [[id number]] Collected: 02/06/23 1820    Specimen: Blood Updated: 02/06/23 1930     Extra Tube Hold for add-ons.     Comment: Auto resulted.       C-reactive Protein [[id number]]  (Normal) Collected: 02/06/23 1820    Specimen: Blood Updated: 02/06/23 1920     C-Reactive Protein <0.30 mg/dL     Comprehensive Metabolic Panel [[id number]]  (Abnormal) Collected: 02/06/23 1820    Specimen: Blood Updated: 02/06/23 1917     Glucose 118 mg/dL      BUN 24 mg/dL      Creatinine 1.05 mg/dL      Sodium 142 mmol/L      Potassium 3.9 mmol/L      Comment: Slight hemolysis detected by analyzer. Results may be affected.        Chloride 107 mmol/L      CO2 23.0 mmol/L      Calcium 9.4 mg/dL      Total Protein 7.4 g/dL      Albumin 4.7 g/dL      ALT (SGPT) 26 U/L      AST (SGOT) 28 U/L      Alkaline Phosphatase 115 U/L      Total Bilirubin 0.2 mg/dL       Globulin 2.7 gm/dL      A/G Ratio 1.7 g/dL      BUN/Creatinine Ratio 22.9     Anion Gap 12.0 mmol/L      eGFR 75.9 mL/min/1.73     Narrative:      GFR Normal >60  Chronic Kidney Disease <60  Kidney Failure <15    The GFR formula is only valid for adults with stable renal function between ages 18 and 70.    CK [[id number]]  (Abnormal) Collected: 02/06/23 1820    Specimen: Blood Updated: 02/06/23 1916     Creatine Kinase 253 U/L     Troponin [[id number]]  (Normal) Collected: 02/06/23 1820    Specimen: Blood Updated: 02/06/23 1913     Troponin T <0.010 ng/mL     Narrative:      Troponin T Reference Range:  <= 0.03 ng/mL-   Negative for AMI  >0.03 ng/mL-     Abnormal for myocardial necrosis.  Clinicians would have to utilize clinical acumen, EKG, Troponin and serial changes to determine if it is an Acute Myocardial Infarction or myocardial injury due to an underlying chronic condition.       Results may be falsely decreased if patient taking Biotin.      Magnesium [[id number]]  (Normal) Collected: 02/06/23 1820    Specimen: Blood Updated: 02/06/23 1911     Magnesium 2.0 mg/dL     Sedimentation Rate [[id number]]  (Normal) Collected: 02/06/23 1820    Specimen: Blood Updated: 02/06/23 1906     Sed Rate 8 mm/hr     Protime-INR [[id number]]  (Normal) Collected: 02/06/23 1830    Specimen: Blood Updated: 02/06/23 1902     Protime 13.5 Seconds      INR 1.02    aPTT [[id number]]  (Abnormal) Collected: 02/06/23 1830    Specimen: Blood Updated: 02/06/23 1902     PTT 23.7 seconds     CBC & Differential [[id number]]  (Abnormal) Collected: 02/06/23 1820    Specimen: Blood Updated: 02/06/23 1853    Narrative:      The following orders were created for panel order CBC & Differential.  Procedure                               Abnormality         Status                     ---------                               -----------         ------                     CBC Auto Differential[[id number]]        Abnormal            Final result                  Please view results for these tests on the individual orders.    CBC Auto Differential [[id number]]  (Abnormal) Collected: 02/06/23 1820    Specimen: Blood Updated: 02/06/23 1853     WBC 8.02 10*3/mm3      RBC 4.40 10*6/mm3      Hemoglobin 13.3 g/dL      Hematocrit 40.6 %      MCV 92.3 fL      MCH 30.2 pg      MCHC 32.8 g/dL      RDW 13.4 %      RDW-SD 45.8 fl      MPV 11.1 fL      Platelets 243 10*3/mm3      Neutrophil % 46.0 %      Lymphocyte % 38.4 %      Monocyte % 12.2 %      Eosinophil % 2.5 %      Basophil % 0.7 %      Immature Grans % 0.2 %      Neutrophils, Absolute 3.68 10*3/mm3      Lymphocytes, Absolute 3.08 10*3/mm3      Monocytes, Absolute 0.98 10*3/mm3      Eosinophils, Absolute 0.20 10*3/mm3      Basophils, Absolute 0.06 10*3/mm3      Immature Grans, Absolute 0.02 10*3/mm3      nRBC 0.0 /100 WBC     Nicole Top [[id number]] Collected: 02/06/23 1830    Specimen: Blood Updated: 02/06/23 1847    POC Glucose Once [[id number]]  (Normal) Collected: 02/06/23 1830    Specimen: Blood Updated: 02/06/23 1841     Glucose 115 mg/dL      Comment: : [id number] Melvin Powell ID: [id number]             .  Imaging Results (Last 24 Hours)     Procedure Component Value Units Date/Time    CT CEREBRAL PERFUSION WITH & WITHOUT CONTRAST [[id number]] Collected: 02/06/23 1914     Updated: 02/06/23 1921    Narrative:      CT CEREBRAL PERFUSION W WO CONTRAST- 2/6/2023 6:42 PM CST     HISTORY: Transient ischemic attack (TIA)     Technique:      1. Perfusion CT is performed to acquire images tracking the temporal  course of iodinated contrast material passing through the cerebral  circulation. Perfusion parameters, such as cerebral blood flow (CBF),  cerebral blood volume (CBV), mean transit time (MTT), etc. are  calculated by [employer] with additional provided perfusion maps and  estimated stroke volumes.   2. Automated exposure control (AEC) protocols are utilized on the  scanner to ensure dose lowered technique.       Comparison: Noncontrast CT brain and brain angiogram dated 2/6/2023 6:42  PM CST     CT dose: DLP  1631  mGycm     Findings:     Small focus of Tmax abnormality in the right basal ganglia,  approximately 6 mL. No corresponding CBF abnormality.     Tmax >6.0 seconds volume: 6 ml     CBF < 30% volume: 0 ml     Mismatch volume: 6 ml      Mismatch ratio: Infinite       Impression:      Impression:  1. No evidence for acute intracranial large vessel occlusion. No  discrete infarct detected by the perfusion software in terms of CBF  abnormality.   This report was finalized on 02/06/2023 19:18 by Dr Luis Armando Limon, .    CT Angiogram Head w AI Analysis of LVO [[id number]] Collected: 02/06/23 1908     Updated: 02/06/23 1917    Narrative:      EXAM: CT ANGIOGRAM HEAD W AI ANALYSIS OF LVO-, CT ANGIOGRAM NECK- -  2/6/2023 6:35 PM CST     HISTORY: Acute Stroke       COMPARISON: CT scan dated 02/06/2023.      DOSE LENGTH PRODUCT: 223 mGy cm. Automated exposure control was also  utilized to decrease patient radiation dose.     TECHNIQUE: CTA head and neck performed with intravenous contrast. 3D  postprocessing, including MIPs, performed and images saved to PACS. AI  ANALYSIS OF LVO WAS UTILIZED.  Grading of carotid stenosis performed  with NASCET criteria.     FINDINGS:      There is a typical three-vessel branching pattern off the aortic arch  without significant ostial narrowing. Common carotid arteries are patent  and without flow-limiting stenosis. Calcified plaque in the carotid  bifurcations without flow-limiting stenosis. Internal carotid arteries  are normal in caliber in the neck. Right ICA is notably tortuous just  below the skull base. The vertebral arteries originate from the  subclavian arteries without significant ostial narrowing.  No evidence  of vertebral artery dissection or pseudoaneurysm.     Distal ICAs are patent. There is a mild atheromatous narrowing at the  anterior genu of the right distal ICA. No  intracranial large vessel  occlusion. Anterior and middle cerebral arteries are patent and without  flow-limiting stenosis. Intracranial vertebral arteries and basilar  artery are normal in caliber. Posterior cerebral arteries are patent and  normal in caliber. No visualized aneurysm or vascular malformation.      No intracranial hemorrhage or mass effect. Orbital contents are  unremarkable. No cervical adenopathy. Lung apices are clear.        Impression:         1. No arterial occlusion or flow-limiting stenosis in the neck.  2. No intracranial large vessel occlusion.   This report was finalized on 02/06/2023 19:14 by Dr Luis Armando Limon, .    CT Angiogram Neck [[id number]] Collected: 02/06/23 1908     Updated: 02/06/23 1917    Narrative:      EXAM: CT ANGIOGRAM HEAD W AI ANALYSIS OF LVO-, CT ANGIOGRAM NECK- -  2/6/2023 6:35 PM CST     HISTORY: Acute Stroke       COMPARISON: CT scan dated 02/06/2023.      DOSE LENGTH PRODUCT: 223 mGy cm. Automated exposure control was also  utilized to decrease patient radiation dose.     TECHNIQUE: CTA head and neck performed with intravenous contrast. 3D  postprocessing, including MIPs, performed and images saved to PACS. AI  ANALYSIS OF LVO WAS UTILIZED.  Grading of carotid stenosis performed  with NASCET criteria.     FINDINGS:      There is a typical three-vessel branching pattern off the aortic arch  without significant ostial narrowing. Common carotid arteries are patent  and without flow-limiting stenosis. Calcified plaque in the carotid  bifurcations without flow-limiting stenosis. Internal carotid arteries  are normal in caliber in the neck. Right ICA is notably tortuous just  below the skull base. The vertebral arteries originate from the  subclavian arteries without significant ostial narrowing.  No evidence  of vertebral artery dissection or pseudoaneurysm.     Distal ICAs are patent. There is a mild atheromatous narrowing at the  anterior genu of the right distal ICA. No  intracranial large vessel  occlusion. Anterior and middle cerebral arteries are patent and without  flow-limiting stenosis. Intracranial vertebral arteries and basilar  artery are normal in caliber. Posterior cerebral arteries are patent and  normal in caliber. No visualized aneurysm or vascular malformation.      No intracranial hemorrhage or mass effect. Orbital contents are  unremarkable. No cervical adenopathy. Lung apices are clear.        Impression:         1. No arterial occlusion or flow-limiting stenosis in the neck.  2. No intracranial large vessel occlusion.   This report was finalized on 02/06/2023 19:14 by Dr Luis Armando Limon, .    CT Head Without Contrast Stroke Protocol [[id number]] Collected: 02/06/23 1845     Updated: 02/06/23 1850    Narrative:      CT HEAD WO CONTRAST STROKE PROTOCOL- 2/6/2023 6:35 PM CST     HISTORY: Transient ischemic attack (TIA)       DOSE LENGTH PRODUCT: 820 mGy cm. Automated exposure control was also  utilized to decrease patient radiation dose.     Technique:   Axial CT of the brain without IV contrast. Sagittal and coronal  reformations are also provided for review. Soft tissue and bone kernels  are available for interpretation.     Comparison: None.     Findings:      There is no evidence of acute large vascular territory infarct. No  intra-axial or extra-axial hemorrhage. No visualized mass lesion or mass  effect. The ventricles, cortical sulci and basal cisterns are symmetric  and age appropriate.  Posterior fossa structures are unremarkable. The  scalp and calvarium are intact. Visualized paranasal sinuses and  mastoids are clear.        Impression:      Impression:    1. No acute intracranial process.  This report was finalized on 02/06/2023 18:47 by Dr Luis Armando Lmion, .            CT of head without contrast reviewed by me.  No acute findings.  CT angiography shows no evidence of large vessel occlusion.  CT perfusion shows evidence of right MCA infarct on the Tmax only  with the suggested penumbra of 6 mL.  Cerebral blood flow less than 30% is 0 suggesting only a penumbra no active ischemic regions.      Impression    • Right MCA infarct  • No evidence of large vessel occlusions  • Initially tPA was not given as had rapid resolution.  Possible worsening after CT scan reinitiated code stroke and therefore received IV tPA.  • Hypertension    Plan    • Admit to critical care unit.  Will be admitted to Dr. Orozco physicians service as he is the primary care physician  • 24-hour tPA precautions  • MRI brain to be scheduled routinely for tomorrow morning  • Repeat head CT at 24 hours to rule out bleeding  • Cardiac echo  • No indication for carotid ultrasonography  • Continue home antihypertensives for now  • Physical, occupational, and speech therapy consultations  • Sequential compression devices for DVT prophylaxis    50 minutes of critical care time was performed as this was a code stroke.    I discussed the patient's findings and my recommendations with patient, family and primary care team          Dayday Fay MD  02/06/23  20:06 CST

## 2023-02-07 NOTE — THERAPY EVALUATION
Acute Care - Occupational Therapy Initial Evaluation  [facility]     Patient Name: Jaime Mckeon  : 1950  MRN: [id number]  Today's Date: 2023  Onset of Illness/Injury or Date of Surgery: 23  Date of Referral to OT: 23  Referring Physician: Dr. Fay    Admit Date: 2023       ICD-10-CM ICD-9-CM   1. Cerebrovascular accident (CVA), unspecified mechanism (HCC)  I63.9 434.91   2. At risk for impaired communication  Z91.89 V15.89   3. Impaired mobility  Z74.09 799.89   4. Decreased activities of daily living (ADL)  Z78.9 V49.89     Patient Active Problem List   Diagnosis   • Encounter for screening for malignant neoplasm of colon   • Stroke (HCC)   • Cerebrovascular accident (CVA), unspecified mechanism (HCC)   • Essential hypertension     Past Medical History:   Diagnosis Date   • Allergic rhinitis    • Arthritis    • BPH (benign prostatic hyperplasia)    • Disease of thyroid gland    • Hyperlipidemia    • Hypertension    • Macular degeneration, wet (HCC)     wet in right  dry in left   • Obesity      Past Surgical History:   Procedure Laterality Date   • CATARACT EXTRACTION     • COLONOSCOPY  2006    Hemorrhoids   • COLONOSCOPY N/A 2022    Procedure: COLONOSCOPY WITH ANESTHESIA;  Surgeon: Elan Davis MD;  Location: [facility] ENDOSCOPY;  Service: Gastroenterology;  Laterality: N/A;  pre: screen  post: polyps. diverticulosis.   Ernesto Swanson MD   • ENDOSCOPY  2006    Grade A esophagitis, small HH; Pt states he does not remember this   • VASECTOMY           OT ASSESSMENT FLOWSHEET (last 12 hours)     OT Evaluation and Treatment     Row Name 23 1315 23 0800                OT Time and Intention    Subjective Information complains of;numbness;weakness  -JJ (r) EC (t) JJ (c) --       Document Type evaluation  -JJ (r) EC (t) JJ (c) --       Mode of Treatment occupational therapy  -JYAMINI (r) EC (t) JJ (c) occupational therapy  -JJ       Session Not Performed --  unable to evaluate, medical status change  -       Comment, Session Not Performed -- TPA protocol. HOLD this am.  -          General Information    Patient Profile Reviewed yes  - (r) EC (t) JJ (c) --       Onset of Illness/Injury or Date of Surgery 02/06/23  - (r) EC (t) JJ (c) --       Referring Physician Dr. Fay  -J (r) EC (t) JJ (c) --       Prior Level of Function independent:;feeding;grooming;dressing;bathing;driving;community mobility;all household mobility  - (r) EC (t) JJ (c) --       Pertinent History of Current Functional Problem left sided weakness Dx: R MCA s/p tPA on 2/6/23  - (r) EC (t) JJ (c) --       Existing Precautions/Restrictions fall  -J (r) EC (t) JJ (c) --          Living Environment    Current Living Arrangements home  walker  -J (r) EC (t) JJ (c) --       Home Accessibility wheelchair accessible;stairs within home  -J (r) EC (t) JJ (c) --       People in Home spouse  -J (r) EC (t) JJ (c) --          Stairs Within Home, Primary    Number of Stairs, Within Home, Primary one  - (r) EC (t) JJ (c) --       Stair Railings, Within Home, Primary none  -J (r) EC (t) JJ (c) --          Pain Assessment    Pretreatment Pain Rating 0/10 - no pain  - (r) EC (t) JJ (c) --       Posttreatment Pain Rating 0/10 - no pain  - (r) EC (t) JJ (c) --          Cognition    Orientation Status (Cognition) oriented x 4  -J (r) EC (t) JJ (c) --       Personal Safety Interventions gait belt;fall prevention program maintained;nonskid shoes/slippers when out of bed;supervised activity  -J (r) EC (t) JJ (c) --          Range of Motion Comprehensive    Comment, General Range of Motion BUE ROM WFL  -J (r) EC (t) JJ (c) --          Strength Comprehensive (MMT)    Comment, General Manual Muscle Testing (MMT) Assessment [facility] 5/5  -VETO (osman) MIKA (zoya) VETO (c) --          Sensory    Additional Documentation Sensory Assessment (Somatosensory) (Group);Vision Assessment/Intervention (Group)  -VETO (osman) MIKA (t) VETO  (c) --          Vision Assessment/Intervention    Visual Impairment/Limitations WFL;corrective lenses for distance  accuracy may be hindered due to difficulty with maintaining testing instructions  -JJ (r) EC (t) JJ (c) --          Sensory Assessment (Somatosensory)    Sensory Assessment (Somatosensory) UE sensation intact  -JJ (r) EC (t) JJ (c) --          Activities of Daily Living    BADL Assessment/Intervention lower body dressing  -JJ (r) EC (t) JJ (c) --          Lower Body Dressing Assessment/Training    Beckham Level (Lower Body Dressing) doff;don;socks;standby assist  simulated  -JJ (r) EC (t) JJ (c) --          Bed Mobility    Bed Mobility supine-sit;sit-supine  -JJ (r) EC (t) JJ (c) --       Supine-Sit Beckham (Bed Mobility) contact guard;verbal cues  -JJ (r) EC (t) JJ (c) --       Sit-Supine Beckham (Bed Mobility) contact guard;verbal cues  -JJ (r) EC (t) JJ (c) --       Assistive Device (Bed Mobility) head of bed elevated  -JJ (r) EC (t) JJ (c) --          Functional Mobility    Functional Mobility- Ind. Level contact guard assist;verbal cues required  -JJ (r) EC (t) JJ (c) --       Functional Mobility- Safety Issues balance decreased during turns  wide gait  -JJ (r) EC (t) JJ (c) --       Functional Mobility- Comment amb in hallway and back to bed CGA  -JJ (r) EC (t) JJ (c) --          Transfer Assessment/Treatment    Transfers sit-stand transfer;stand-sit transfer  -JJ (r) EC (t) JJ (c) --          Sit-Stand Transfer    Sit-Stand Beckham (Transfers) contact guard  -JJ (r) EC (t) JJ (c) --          Stand-Sit Transfer    Stand-Sit Beckham (Transfers) contact guard  -JJ (r) EC (t) JJ (c) --          Safety Issues, Functional Mobility    Safety Issues Affecting Function (Mobility) sequencing abilities;impulsivity  -JJ (r) EC (t) JJ (c) --       Impairments Affecting Function (Mobility) balance;coordination  -JJ (r) EC (t) JJ (c) --          Motor Skills    Motor Skills  coordination  -JJ (r) EC (t) JJ (c) --       Coordination left;upper extremity;minimal impairment;ataxia;finger to nose  -JJ (r) EC (t) JJ (c) --          Balance    Balance Assessment sitting static balance;sitting dynamic balance;standing static balance;standing dynamic balance  -JJ (r) EC (t) JJ (c) --       Static Sitting Balance supervision  -JJ (r) EC (t) JJ (c) --       Dynamic Sitting Balance supervision  -J (r) EC (t) JJ (c) --       Position, Sitting Balance unsupported;sitting edge of bed  -JJ (r) EC (t) JJ (c) --       Static Standing Balance contact guard  -J (r) EC (t) JJ (c) --       Dynamic Standing Balance contact guard  -J (r) EC (t) JJ (c) --          Plan of Care Review    Plan of Care Reviewed With patient  -JYAMINI (r) MIKA (t) VETO (c) --       Progress no change  -JYAMINI (r) EC (t) JJ (c) --       Outcome Evaluation OT eval complete. Pt A&Ox4 w/ wife attentive at bedside. Pt c/o of mild weakness and numbness in L distal extremities. Pt was CGA for all bed mobility and transfers, demonstrated good sitting balance. BUE ROM WFL and strength 5/5. No impairments noted in sensation. Pt demonstrated minimally decreased coordination in LUE. Vision appeared to be WNL but accuracy may be dimished due to his difficulty w/ following testing instructions. Pt often needed PT/OT to repeat instructions during eval.  He was able to amb in hallway CGA, mildly ataxic but aware of impairments. Pt often needed PT/OT to repeat instructions during eval. Pt would benefit from skilled OT to increase balance during ADLs and fxnal mobility as well as improve motor coordination. Recommend d/c home w/ assist.  -JYAMINI (r) EC (t) JJ (c) --          Positioning and Restraints    Pre-Treatment Position in bed  -JYAMINI (r) EC (t) JJ (c) --       Post Treatment Position bed  -JYAMINI (r) EC (t) JJ (c) --       In Bed fowlers;call light within reach;encouraged to call for assist;with family/caregiver  -VETO (r) MIKA (t) VETO (c) --          Therapy  Assessment/Plan (OT)    Date of Referral to OT 02/06/23  -JYAMINI (r) EC (t) JJ (c) --       OT Diagnosis decreased ADLs  -JYAMINI (r) EC (t) JJ (c) --       Rehab Potential (OT) good, to achieve stated therapy goals  -JYAMINI (r) EC (t) JJ (c) --       Criteria for Skilled Therapeutic Interventions Met (OT) yes;meets criteria;skilled treatment is necessary  -JYAMINI (r) EC (t) JJ (c) --       Therapy Frequency (OT) 5 times/wk  -JYAMINI (r) EC (t) JJ (c) --       Predicted Duration of Therapy Intervention (OT) 10 days  -JYAMINI (r) EC (t) JJ (c) --       Planned Therapy Interventions (OT) BADL retraining;functional balance retraining;IADL retraining;neuromuscular control/coordination retraining;occupation/activity based interventions;patient/caregiver education/training;transfer/mobility retraining  -JJ (r) EC (t) JJ (c) --          OT Goals    Bathing Goal Selection (OT) bathing, OT goal 1  -JJ (r) EC (t) JJ (c) --       Toileting Goal Selection (OT) toileting, OT goal 1  -JJ (r) EC (t) JJ (c) --       Coordination Goal Selection (OT) coordination, OT goal 1  -JJ (r) EC (t) JJ (c) --          Bathing Goal 1 (OT)    Activity/Device (Bathing Goal 1, OT) upper body bathing;lower body bathing;shower chair;long-handled sponge  -JJ (r) EC (t) JJ (c) --       Wheeler Level/Cues Needed (Bathing Goal 1, OT) supervision required  -JJ (r) EC (t) JJ (c) --       Time Frame (Bathing Goal 1, OT) long term goal (LTG)  -JJ (r) EC (t) JJ (c) --       Progress/Outcomes (Bathing Goal 1, OT) new goal  -JJ (r) EC (t) JJ (c) --          Toileting Goal 1 (OT)    Activity/Device (Toileting Goal 1, OT) adjust/manage clothing;perform perineal hygiene;commode;commode, bedside without drop arms  -VETO (r) EC (t) VETO (c) --       Wheeler Level/Cues Needed (Toileting Goal 1, OT) supervision required  -VETO (r) MIKA (t) VETO (c) --       Time Frame (Toileting Goal 1, OT) long term goal (LTG)  -VETO (r) MIKA (t) VETO (c) --       Progress/Outcome (Toileting Goal 1, OT) new goal   -JJ (r) EC (t) JJ (c) --          Coordination Goal 1 (OT)    Activity/Assistive Device (Coordination Goal 1, OT) GM task  within the context of an ADL, demonstrate motor coordination WNL  -JJ (r) EC (t) JJ (c) --       Starke Level/Cues Needed (Coordination Goal 1, OT) independent  -JJ (r) EC (t) JJ (c) --       Time Frame (Coordination Goal 1, OT) long term goal (LTG)  -JJ (r) EC (t) JJ (c) --       Progress/Outcomes (Coordination Goal 1, OT) new goal  -JJ (r) EC (t) JJ (c) --             User Key  (r) = Recorded By, (t) = Taken By, (c) = Cosigned By    Initials Name Effective Dates    Sophie Antonio, OTR/L, CSRS 11/10/21 -     EC Milagros Campo, OT Student 12/12/22 -                  Occupational Therapy Education     Title: PT OT SLP Therapies (In Progress)     Topic: Occupational Therapy (Done)     Point: Precautions (Done)     Description:   Instruct learner(s) on prescribed precautions during self-care and functional transfers.              Learning Progress Summary           Patient Acceptance, E, VU by EC at 2/7/2023 1420    Comment: safety during transfers and functional mobility, supportive shoulder positions, use of affected side, benefits of therapy   Significant Other Acceptance, E, VU by EC at 2/7/2023 1420    Comment: safety during transfers and functional mobility, supportive shoulder positions, use of affected side, benefits of therapy                   Point: Body mechanics (Done)     Description:   Instruct learner(s) on proper positioning and spine alignment during self-care, functional mobility activities and/or exercises.              Learning Progress Summary           Patient Acceptance, E, VU by EC at 2/7/2023 1420    Comment: safety during transfers and functional mobility, supportive shoulder positions, use of affected side, benefits of therapy   Significant Other Acceptance, E, VU by EC at 2/7/2023 1420    Comment: safety during transfers and functional mobility, supportive  shoulder positions, use of affected side, benefits of therapy                               User Key     Initials Effective Dates Name Provider Type Discipline     12/12/22 -  Milagros Campo, ANGELICA Student OT Student OT                  OT Recommendation and Plan  Planned Therapy Interventions (OT): BADL retraining, functional balance retraining, IADL retraining, neuromuscular control/coordination retraining, occupation/activity based interventions, patient/caregiver education/training, transfer/mobility retraining  Therapy Frequency (OT): 5 times/wk  Plan of Care Review  Plan of Care Reviewed With: patient  Progress: no change  Outcome Evaluation: OT eval complete. Pt A&Ox4 w/ wife attentive at bedside. Pt c/o of mild weakness and numbness in L distal extremities. Pt was CGA for all bed mobility and transfers, demonstrated good sitting balance. BUE ROM WFL and strength 5/5. No impairments noted in sensation. Pt demonstrated minimally decreased coordination in LUE. Vision appeared to be WNL but accuracy may be dimished due to his difficulty w/ following testing instructions. Pt often needed PT/OT to repeat instructions during eval.  He was able to amb in hallway CGA, mildly ataxic but aware of impairments. Pt often needed PT/OT to repeat instructions during eval. Pt would benefit from skilled OT to increase balance during ADLs and fxnal mobility as well as improve motor coordination. Recommend d/c home w/ assist.  Plan of Care Reviewed With: patient  Outcome Evaluation: OT eval complete. Pt A&Ox4 w/ wife attentive at bedside. Pt c/o of mild weakness and numbness in L distal extremities. Pt was CGA for all bed mobility and transfers, demonstrated good sitting balance. BUE ROM WFL and strength 5/5. No impairments noted in sensation. Pt demonstrated minimally decreased coordination in LUE. Vision appeared to be WNL but accuracy may be dimished due to his difficulty w/ following testing instructions. Pt often needed  PT/OT to repeat instructions during eval.  He was able to amb in hallway CGA, mildly ataxic but aware of impairments. Pt often needed PT/OT to repeat instructions during eval. Pt would benefit from skilled OT to increase balance during ADLs and fxnal mobility as well as improve motor coordination. Recommend d/c home w/ assist.     Outcome Measures     Row Name 02/07/23 1400             How much help from another is currently needed...    Putting on and taking off regular lower body clothing? 3  -JJ (r) EC (t) JJ (c)      Bathing (including washing, rinsing, and drying) 3  -JJ (r) EC (t) JJ (c)      Toileting (which includes using toilet bed pan or urinal) 3  -JJ (r) EC (t) JJ (c)      Putting on and taking off regular upper body clothing 3  -JJ (r) EC (t) JJ (c)      Taking care of personal grooming (such as brushing teeth) 3  -JJ (r) EC (t) JJ (c)      Eating meals 4  -JJ (r) EC (t) JJ (c)      AM-PAC 6 Clicks Score (OT) 19  -JJ (r) EC (t)         Functional Assessment    Outcome Measure Options AM-PAC 6 Clicks Daily Activity (OT)  -JJ (r) EC (t) JJ (c)            User Key  (r) = Recorded By, (t) = Taken By, (c) = Cosigned By    Initials Name Provider Type    Sophie Antonio, OTR/L, CSRS Occupational Therapist    Milagros Dye, OT Student OT Student                Time Calculation:    Time Calculation- OT     Row Name 02/07/23 1357             Time Calculation- OT    OT Start Time 1315  -JJ (r) EC (t) JJ (c)      OT Stop Time 1355  -JJ (r) EC (t) JJ (c)      OT Time Calculation (min) 40 min  -JJ (r) EC (t)      OT Received On 02/07/23  -JJ (r) EC (t) JJ (c)      OT Goal Re-Cert Due Date 02/17/23  -JJ (r) EC (t) JJ (c)            User Key  (r) = Recorded By, (t) = Taken By, (c) = Cosigned By    Initials Name Provider Type    Sophie Antonio, OTR/L, CSRS Occupational Therapist    Milagros Dye, OT Student OT Student                       Milagros Campo, OT Student  2/7/2023

## 2023-02-07 NOTE — THERAPY EVALUATION
Patient Name: Jaime Mckeon  : 1950    MRN: [id number]                              Today's Date: 2023       Admit Date: 2023    Visit Dx:     ICD-10-CM ICD-9-CM   1. Cerebrovascular accident (CVA), unspecified mechanism (HCC)  I63.9 434.91   2. At risk for impaired communication  Z91.89 V15.89   3. Impaired mobility  Z74.09 799.89     Patient Active Problem List   Diagnosis   • Encounter for screening for malignant neoplasm of colon   • Stroke (HCC)   • Cerebrovascular accident (CVA), unspecified mechanism (HCC)   • Essential hypertension     Past Medical History:   Diagnosis Date   • Allergic rhinitis    • Arthritis    • BPH (benign prostatic hyperplasia)    • Disease of thyroid gland    • Hyperlipidemia    • Hypertension    • Macular degeneration, wet (HCC)     wet in right  dry in left   • Obesity      Past Surgical History:   Procedure Laterality Date   • CATARACT EXTRACTION     • COLONOSCOPY  2006    Hemorrhoids   • COLONOSCOPY N/A 2022    Procedure: COLONOSCOPY WITH ANESTHESIA;  Surgeon: Elan Davis MD;  Location: [facility] ENDOSCOPY;  Service: Gastroenterology;  Laterality: N/A;  pre: screen  post: polyps. diverticulosis.   Ernesto Swanson MD   • ENDOSCOPY  2006    Grade A esophagitis, small HH; Pt states he does not remember this   • VASECTOMY        General Information     Row Name 23 1313          Physical Therapy Time and Intention    Document Type evaluation  L arm weakness. Dx: R MCA CVA s/p tpa .  -SWAPNA     Mode of Treatment physical therapy  -SWAPNA     Row Name 23 1313          General Information    Patient Profile Reviewed yes  -SWAPNA     Prior Level of Function independent:;all household mobility;community mobility;ADL's;work  -SWAPNA     Existing Precautions/Restrictions fall  -SWAPNA     Barriers to Rehab medically complex  -SWAPNA     Row Name 23 1313          Living Environment    People in Home spouse  -SWAPNA     Row Name 23 1313          Home Main  Entrance    Number of Stairs, Main Entrance one  -SWAPNA     Row Name 02/07/23 1313          Stairs Within Home, Primary    Number of Stairs, Within Home, Primary none  -SWAPNA     Row Name 02/07/23 1313          Cognition    Orientation Status (Cognition) oriented x 4  -SWAPNA     Row Name 02/07/23 1313          Safety Issues, Functional Mobility    Impairments Affecting Function (Mobility) coordination  -SWAPNA           User Key  (r) = Recorded By, (t) = Taken By, (c) = Cosigned By    Initials Name Provider Type    Warren Ferrell PT DPT Physical Therapist               Mobility     Row Name 02/07/23 1313          Bed Mobility    Bed Mobility supine-sit;sit-supine  -SWAPNA     Supine-Sit [place] (Bed Mobility) supervision  -SWAPNA     Sit-Supine [place] (Bed Mobility) supervision  -SWAPNA     Row Name 02/07/23 1313          Sit-Stand Transfer    Sit-Stand [place] (Transfers) contact guard  -SWAPNA     Row Name 02/07/23 1313          Gait/Stairs (Locomotion)    [place] Level (Gait) contact guard  -SWAPNA     Distance in Feet (Gait) 200 ft  -SWAPNA     Deviations/Abnormal Patterns (Gait) left sided deviations;ataxic   -SWAPNA     Comment, (Gait/Stairs) L LE mildly ataxic  -SWAPNA           User Key  (r) = Recorded By, (t) = Taken By, (c) = Cosigned By    Initials Name Provider Type    Warren Ferrell PT DPT Physical Therapist               Obj/Interventions     Row Name 02/07/23 1313          Range of Motion Comprehensive    General Range of Motion bilateral lower extremity ROM WFL  -SWAPNA     Row Name 02/07/23 1313          Strength Comprehensive (MMT)    Comment, General Manual Muscle Testing (MMT) Assessment B LE 5/5  -SWAPNA     Row Name 02/07/23 1313          Motor Skills    Motor Skills coordination  -SWAPNA     Coordination left;lower extremity;minimal impairment;ataxia;heel to elias   -SWAPNA     Row Name 02/07/23 1313          Balance    Balance Assessment sitting dynamic balance;standing dynamic balance  -SWAPNA     Dynamic Sitting Balance  supervision  -SWAPNA     Position, Sitting Balance unsupported;sitting edge of bed  -SWAPNA     Dynamic Standing Balance contact guard  -SWAPNA     Position/Device Used, Standing Balance supported  -SWAPNA     Row Name 02/07/23 1313          Sensory Assessment (Somatosensory)    Sensory Assessment (Somatosensory) LE sensation intact  -SWAPNA           User Key  (r) = Recorded By, (t) = Taken By, (c) = Cosigned By    Initials Name Provider Type    Warren Ferrell PT DPT Physical Therapist               Goals/Plan     Row Name 02/07/23 1313          Gait Training Goal 1 (PT)    Activity/Assistive Device (Gait Training Goal 1, PT) gait (walking locomotion);decrease fall risk;diminish gait deviation;improve balance and speed;increase endurance/gait distance  -SWAPNA     [place] Level (Gait Training Goal 1, PT) independent  -SWAPNA     Distance (Gait Training Goal 1, PT) 200 ft  -SWAPNA     Time Frame (Gait Training Goal 1, PT) long term goal (LTG);10 days  -SWAPNA     Progress/Outcome (Gait Training Goal 1, PT) new goal  -SWAPNA     Row Name 02/07/23 1313          Stairs Goal 1 (PT)    Activity/Assistive Device (Stairs Goal 1, PT) ascending stairs;descending stairs;using handrail, left;using handrail, right  -SWAPNA     [place] Level/Cues Needed (Stairs Goal 1, PT) supervision required  -SWAPNA     Number of Stairs (Stairs Goal 1, PT) 1-3 steps  -SWAPNA     Time Frame (Stairs Goal 1, PT) long term goal (LTG);10 days  -SWAPNA     Progress/Outcome (Stairs Goal 1, PT) new goal  -SWAPNA     Row Name 02/07/23 1313          Therapy Assessment/Plan (PT)    Planned Therapy Interventions (PT) gait training;balance training;home exercise program;patient/family education;stair training;motor coordination training  -SWAPNA           User Key  (r) = Recorded By, (t) = Taken By, (c) = Cosigned By    Initials Name Provider Type    Warren Ferrell PT DPT Physical Therapist               Clinical Impression     Row Name 02/07/23 1313          Pain    Pretreatment Pain Rating  0/10 - no pain  -SWAPNA     Row Name 02/07/23 1313          Plan of Care Review    Plan of Care Reviewed With patient;spouse  -SWAPNA     Outcome Evaluation PT eval complete. He is alert and oriented x 4. Spouse in room during assessment. He reports being independent and working prior to this admit. He demos equal B LE strength, AROM and sensation to testing. Demos mild ataxia in L LE with heel to shin testing and with gait. He is aware of the coordination deficit with gait and was able to focus with his L LE steps. He ambulates 200 ft around the unit. He and his spouse report much improvement since TPA. PT will cont with gait and coordination training. Anticipate he will d.c home w spouse, consider outpatient PT if needed for L LE coordination.  -SWAPNA     Row Name 02/07/23 1313          Therapy Assessment/Plan (PT)    Patient/Family Therapy Goals Statement (PT) go home  -SWAPNA     Rehab Potential (PT) good, to achieve stated therapy goals  -SWAPNA     Criteria for Skilled Interventions Met (PT) yes;meets criteria;skilled treatment is necessary  -SWAPNA     Therapy Frequency (PT) 2 times/day  -SWAPNA     Predicted Duration of Therapy Intervention (PT) until d.c  -SWAPNA     Row Name 02/07/23 1313          Positioning and Restraints    Pre-Treatment Position in bed  -SWAPNA     Post Treatment Position bed  -SWAPNA     In Bed notified nsg;fowlers;call light within reach;encouraged to call for assist;patient within staff view;with family/caregiver  -SWAPNA           User Key  (r) = Recorded By, (t) = Taken By, (c) = Cosigned By    Initials Name Provider Type    SWAPNA Warren Soria, PT DPT Physical Therapist               Outcome Measures     Row Name 02/07/23 1313          How much help from another person do you currently need...    Turning from your back to your side while in flat bed without using bedrails? 4  -SWAPNA     Moving from lying on back to sitting on the side of a flat bed without bedrails? 4  -SWAPNA     Moving to and from a bed to a chair (including  a wheelchair)? 3  -SWAPNA     Standing up from a chair using your arms (e.g., wheelchair, bedside chair)? 3  -SWAPNA     Climbing 3-5 steps with a railing? 3  -SWAPNA     To walk in hospital room? 3  -SWAPNA     AM-PAC 6 Clicks Score (PT) 20  -SWAPNA     Highest level of mobility 6 --> Walked 10 steps or more  -SWAPNA     Row Name 02/07/23 1313          Functional Assessment    Outcome Measure Options AM-PAC 6 Clicks Basic Mobility (PT)  -SWAPNA           User Key  (r) = Recorded By, (t) = Taken By, (c) = Cosigned By    Initials Name Provider Type    Warren Ferrell, PT DPT Physical Therapist                             Physical Therapy Education     Title: PT OT SLP Therapies (In Progress)     Topic: Physical Therapy (In Progress)     Point: Mobility training (Done)     Learning Progress Summary           Patient Acceptance, E, VU,DU by SWAPNA at 2/7/2023 1313    Comment: benefits of activity, progression of PT   Significant Other Acceptance, E, VU,DU by SWAPNA at 2/7/2023 1313    Comment: benefits of activity, progression of PT                   Point: Home exercise program (Not Started)     Learner Progress:  Not documented in this visit.          Point: Body mechanics (Not Started)     Learner Progress:  Not documented in this visit.          Point: Precautions (Not Started)     Learner Progress:  Not documented in this visit.                      User Key     Initials Effective Dates Name Provider Type [facility] 02/03/23 -  Warren Soria PT DPT Physical Therapist PT              PT Recommendation and Plan  Planned Therapy Interventions (PT): gait training, balance training, home exercise program, patient/family education, stair training, motor coordination training  Plan of Care Reviewed With: patient, spouse  Outcome Evaluation: PT eval complete. He is alert and oriented x 4. Spouse in room during assessment. He reports being independent and working prior to this admit. He demos equal B LE strength, AROM and sensation to  testing. Demos mild ataxia in L LE with heel to shin testing and with gait. He is aware of the coordination deficit with gait and was able to focus with his L LE steps. He ambulates 200 ft around the unit. He and his spouse report much improvement since TPA. PT will cont with gait and coordination training. Anticipate he will d.c home w spouse, consider outpatient PT if needed for L LE coordination.     Time Calculation:    PT Charges     Row Name 02/07/23 1414             Time Calculation    Start Time 1313  -SWAPNA      Stop Time 1400  -SWAPNA      Time Calculation (min) 47 min  -SWAPNA      PT Received On 02/07/23  -SWAPNA      PT Goal Re-Cert Due Date 02/17/23  -SWAPNA            User Key  (r) = Recorded By, (t) = Taken By, (c) = Cosigned By    Initials Name Provider Type    Warren Ferrell, PT DPT Physical Therapist              Therapy Charges for Today     Code Description Service Date Service Provider Modifiers Qty    07366002205 HC PT EVAL MOD COMPLEXITY 3 2/7/2023 Warren Soria PT DPT GP 1          PT G-Codes  Outcome Measure Options: AM-PAC 6 Clicks Basic Mobility (PT)  AM-PAC 6 Clicks Score (PT): 20  PT Discharge Summary  Anticipated Discharge Disposition (PT): home with assist, home with 24/7 care, home with outpatient therapy services    Warren Soria, PT DPT  2/7/2023

## 2023-02-08 ENCOUNTER — READMISSION MANAGEMENT (OUTPATIENT)
Dept: CALL CENTER | Facility: HOSPITAL | Age: 73
End: 2023-02-08
Payer: MEDICARE

## 2023-02-08 ENCOUNTER — HOSPITAL ENCOUNTER (INPATIENT)
Dept: CARDIOLOGY | Facility: HOSPITAL | Age: 73
Discharge: HOME | DRG: 62 | End: 2023-02-08
Payer: MEDICARE

## 2023-02-08 ENCOUNTER — TELEPHONE (OUTPATIENT)
Dept: NEUROLOGY | Facility: CLINIC | Age: 73
End: 2023-02-08
Payer: MEDICARE

## 2023-02-08 VITALS
OXYGEN SATURATION: 97 % | WEIGHT: 244.1 LBS | DIASTOLIC BLOOD PRESSURE: 78 MMHG | RESPIRATION RATE: 20 BRPM | HEART RATE: 84 BPM | BODY MASS INDEX: 34.17 KG/M2 | SYSTOLIC BLOOD PRESSURE: 141 MMHG | HEIGHT: 71 IN | TEMPERATURE: 97.9 F

## 2023-02-08 DIAGNOSIS — I51.7 ENLARGED LA (LEFT ATRIUM): ICD-10-CM

## 2023-02-08 DIAGNOSIS — I63.311 CEREBROVASCULAR ACCIDENT (CVA) DUE TO THROMBOSIS OF RIGHT MIDDLE CEREBRAL ARTERY: ICD-10-CM

## 2023-02-08 PROCEDURE — 97530 THERAPEUTIC ACTIVITIES: CPT | Performed by: OCCUPATIONAL THERAPIST

## 2023-02-08 PROCEDURE — 99233 SBSQ HOSP IP/OBS HIGH 50: CPT | Performed by: CLINICAL NURSE SPECIALIST

## 2023-02-08 PROCEDURE — 97110 THERAPEUTIC EXERCISES: CPT

## 2023-02-08 PROCEDURE — 97535 SELF CARE MNGMENT TRAINING: CPT | Performed by: OCCUPATIONAL THERAPIST

## 2023-02-08 PROCEDURE — 97116 GAIT TRAINING THERAPY: CPT

## 2023-02-08 PROCEDURE — 93246 EXT ECG>7D<15D RECORDING: CPT

## 2023-02-08 RX ORDER — ASPIRIN 325 MG
325 TABLET ORAL DAILY
Qty: 30 TABLET | Refills: 0
Start: 2023-02-09

## 2023-02-08 RX ADMIN — LEVOTHYROXINE SODIUM 25 MCG: 25 TABLET ORAL at 05:05

## 2023-02-08 RX ADMIN — ASPIRIN 325 MG: 325 TABLET ORAL at 08:43

## 2023-02-08 RX ADMIN — ATORVASTATIN CALCIUM 40 MG: 40 TABLET, FILM COATED ORAL at 08:43

## 2023-02-08 RX ADMIN — AMLODIPINE BESYLATE 10 MG: 10 TABLET ORAL at 08:43

## 2023-02-08 RX ADMIN — LOSARTAN POTASSIUM 100 MG: 50 TABLET, FILM COATED ORAL at 08:43

## 2023-02-08 NOTE — THERAPY DISCHARGE NOTE
Acute Care - Physical Therapy Discharge Summary  [facility]       Patient Name: Jaime Mckeon  : 1950  MRN: [id number]    Today's Date: 2023  Onset of Illness/Injury or Date of Surgery: 23       Referring Physician: Dr. Fay      Admit Date: 2023      PT Recommendation and Plan    Visit Dx:    ICD-10-CM ICD-9-CM   1. Cerebrovascular accident (CVA), unspecified mechanism (HCC)  I63.9 434.91   2. At risk for impaired communication  Z91.89 V15.89   3. Impaired mobility  Z74.09 799.89   4. Decreased activities of daily living (ADL)  Z78.9 V49.89   5. Cerebrovascular accident (CVA) due to thrombosis of right middle cerebral artery (HCC)  I63.311 434.01   6. Enlarged LA (left atrium)  I51.7 429.3        Outcome Measures     Row Name 23 0800 23 1400          How much help from another person do you currently need...    Turning from your back to your side while in flat bed without using bedrails? 4  -MANDEEP --     Moving from lying on back to sitting on the side of a flat bed without bedrails? 4  -MANDEEP --     Moving to and from a bed to a chair (including a wheelchair)? 4  -MANDEEP --     Standing up from a chair using your arms (e.g., wheelchair, bedside chair)? 4  -MANDEEP --     Climbing 3-5 steps with a railing? 3  -MANDEEP --     To walk in hospital room? 3  -MANDEEP --     AM-PAC 6 Clicks Score (PT) 22  -MANDEEP --        How much help from another is currently needed...    Putting on and taking off regular lower body clothing? -- 3  -JJ (r) EC (t) JJ (c)     Bathing (including washing, rinsing, and drying) -- 3  -JJ (r) EC (t) JJ (c)     Toileting (which includes using toilet bed pan or urinal) -- 3  -JJ (r) EC (t) JJ (c)     Putting on and taking off regular upper body clothing -- 3  -JJ (r) EC (t) JJ (c)     Taking care of personal grooming (such as brushing teeth) -- 3  -JJ (r) EC (t) JJ (c)     Eating meals -- 4  -JJ (r) EC (t) JJ (c)     AM-PAC 6 Clicks Score (OT) -- 19  -JJ (r) EC (t)        Functional  Assessment    Outcome Measure Options -- AM-PAC 6 Clicks Daily Activity (OT)  -JJ (r) EC (t) JJ (c)           User Key  (r) = Recorded By, (t) = Taken By, (c) = Cosigned By    Initials Name Provider Type    Aimee Littlejohn PTA Physical Therapist Assistant    Sophie Antonio, OTR/L, CSRS Occupational Therapist    EC Milagros Campo, OT Student OT Student                 PT Charges     Row Name 02/08/23 0851             Time Calculation    Start Time 0813  -MANDEEP      Stop Time 0836  -MANDEEP      Time Calculation (min) 23 min  -MANDEEP         Timed Charges    70531 - PT Therapeutic Exercise Minutes 14  -MANDEEP      62123 - Gait Training Minutes  9  -MANDEEP         Total Minutes    Timed Charges Total Minutes 23  -MANDEEP       Total Minutes 23  -MANDEEP            User Key  (r) = Recorded By, (t) = Taken By, (c) = Cosigned By    Initials Name Provider Type    Aimee Littlejohn PTA Physical Therapist Assistant                 PT Rehab Goals     Row Name 02/08/23 1300             Gait Training Goal 1 (PT)    [place] Level (Gait Training Goal 1, PT) contact guard required  Goal: independent  -MANDEEP      Distance (Gait Training Goal 1, PT) 225  Goal:200  -MANDEEP      Progress/Outcome (Gait Training Goal 1, PT) goal partially met  -MANDEEP         Stairs Goal 1 (PT)    [place] Level/Cues Needed (Stairs Goal 1, PT) --  Goal: supervision  -MANDEEP      Number of Stairs (Stairs Goal 1, PT) 0  Goal: 1-3  -MANDEEP      Progress/Outcome (Stairs Goal 1, PT) goal not met  -MANDEEP            User Key  (r) = Recorded By, (t) = Taken By, (c) = Cosigned By    Initials Name Provider Type Discipline    Aimee Littlejohn PTA Physical Therapist Assistant PT                Therapy Charges for Today     Code Description Service Date Service Provider Modifiers Qty    26861111372 HC GAIT TRAINING EA 15 MIN 2/8/2023 Aimee Veloz PTA GP 1    42245185766 HC PT THER PROC EA 15 MIN 2/8/2023 Aimee Veloz PTA GP 1          PT Discharge Summary  Anticipated  Discharge Disposition (PT): home with outpatient therapy services  Reason for Discharge: Discharge from facility  Outcomes Achieved: Patient able to partially acheive established goals  Discharge Destination: Home with outpatient services      Aimee Veloz, PTA   2/8/2023

## 2023-02-08 NOTE — PLAN OF CARE
Problem: Fall Injury Risk  Goal: Absence of Fall and Fall-Related Injury  Intervention: Identify and Manage Contributors  Description: Develop a fall prevention plan with the patient and caregiver/family.  Provide reorientation, appropriate sensory stimulation and routines with changes in mental status to decrease risk of fall.  Promote use of personal vision and auditory aids.  Assess assistance level required for safe and effective self-care; provide support as needed, such as toileting, mobilization. For age 65 and older, implement timed toileting with assistance.  Encourage physical activity, such as performance of mobility and self-care at highest level of patient ability, multicomponent exercise program and provision of appropriate assistive devices.  If fall occurs, assess the severity of injury; implement fall injury protocol. Determine the cause and revise fall injury prevention plan.  Regularly review medication contribution to fall risk; adjust medication administration times to minimize risk of falling.  Consider risk related to polypharmacy and age.  Balance adequate pain management with potential for oversedation.  Recent Flowsheet Documentation  Taken 2/8/2023 0400 by Justus Gleason, RN  Medication Review/Management:   medications reviewed   dosing adjusted  Taken 2/7/2023 2000 by Justus Gleason, RN  Medication Review/Management:   medications reviewed   dosing adjusted  Intervention: Promote Injury-Free Environment  Description: Provide a safe, barrier-free environment that encourages independent activity.  Keep care area uncluttered and well-lighted.  Determine need for increased observation or monitoring.  Avoid use of devices that minimize mobility, such as restraints or indwelling urinary catheter.  Recent Flowsheet Documentation  Taken 2/8/2023 0500 by Justus Gleason, RN  Safety Promotion/Fall Prevention: safety round/check completed  Taken 2/8/2023 0400 by Justus Gleason, RN  Safety  Promotion/Fall Prevention: safety round/check completed  Taken 2/8/2023 0300 by Justus Gleason RN  Safety Promotion/Fall Prevention: safety round/check completed  Taken 2/8/2023 0200 by Justus Gleasno RN  Safety Promotion/Fall Prevention: safety round/check completed  Taken 2/8/2023 0100 by Justus Gleason RN  Safety Promotion/Fall Prevention: safety round/check completed  Taken 2/8/2023 0010 by Justus Gleason RN  Safety Promotion/Fall Prevention: safety round/check completed  Taken 2/7/2023 2300 by Justus Gleason RN  Safety Promotion/Fall Prevention: safety round/check completed  Taken 2/7/2023 2200 by Justus Gleason RN  Safety Promotion/Fall Prevention: safety round/check completed  Taken 2/7/2023 2100 by Justus Gleason RN  Safety Promotion/Fall Prevention: safety round/check completed  Taken 2/7/2023 2000 by Justus Gleason RN  Safety Promotion/Fall Prevention: safety round/check completed     Problem: Skin Injury Risk Increased  Goal: Skin Health and Integrity  Intervention: Optimize Skin Protection  Description: Perform a full pressure injury risk assessment, as indicated by screening, upon admission to care unit.  Reassess skin (injury risk, full inspection) frequently (e.g., scheduled interval, with change in condition) to provide optimal early detection and prevention.  Maintain adequate tissue perfusion (e.g., encourage fluid balance; avoid crossing legs, constrictive clothing or devices) to promote tissue oxygenation.  Maintain head of bed at lowest degree of elevation tolerated, considering medical condition and other restrictions.  Avoid positioning onto an area that remains reddened.  Minimize incontinence and moisture (e.g., toileting schedule; moisture-wicking pad, diaper or incontinence collection device; skin moisture barrier).  Cleanse skin promptly and gently when soiled utilizing a pH-balanced cleanser.  Relieve and redistribute pressure (e.g., scheduled position changes, weight shifts, use of  support surface, medical device repositioning, protective dressing application, use of positioning device, microclimate control, use of pressure-injury-monitor  Encourage increased activity, such as sitting in a chair at the bedside or early mobilization, when able to tolerate.  Recent Flowsheet Documentation  Taken 2/8/2023 0400 by Justus Gleason RN  Head of Bed (HOB) Positioning: HOB at 20-30 degrees  Taken 2/8/2023 0010 by Justus Gleason RN  Head of Bed (HOB) Positioning: HOB at 30-45 degrees  Taken 2/7/2023 2000 by Justus Gleason RN  Head of Bed (HOB) Positioning: HOB at 30-45 degrees     Problem: Functional Ability Impaired (Stroke, Ischemic/Transient Ischemic Attack)  Goal: Optimal Functional Ability  Intervention: Optimize Functional Ability  Description: Evaluate and retrain in functional mobility and ADLs (activities of daily living) based on ability and tolerance; provide level of assistance and supervision needed for safety.  Initiate sitting and standing when able; evaluate and address balance, postural control and fall risk.  Instruct in mobility and ADL (activities of daily living) techniques with task-specific training individualized to patient needs and discharge disposition; promote highest level of independence and safety.  Pace, coordinate and organize care schedule and activity per patient preference, priorities and tolerance; train in energy-conservation techniques.  Provide repetitive, mobility-task training for gait disturbances; consider ankle foot orthosis or functional electrical stimulation if foot drop is present.  Consider multimodal therapeutic interventions, such as virtual reality, graded motor imagery/mirror therapy, robotic or treadmill training and FES (functional electrical stimulation).  Promote use of recommended adaptive equipment, assistive devices or orthoses, such as a cane or walker.  Maintain patient’s preferred routines and habits; respect privacy and personal  space.  Provide a safe, barrier-free, uncluttered environment that promotes optimal level of function.  Recent Flowsheet Documentation  Taken 2/8/2023 0400 by Justus Gleason RN  Activity Management: bedrest  Taken 2/8/2023 0010 by Justus Gleason RN  Activity Management: bedrest  Taken 2/7/2023 2000 by Justus Gleason RN  Activity Management: bedrest     Problem: Sensorimotor Impairment (Stroke, Ischemic/Transient Ischemic Attack)  Goal: Improved Sensorimotor Function  Intervention: Optimize Range of Motion, Motor Control and Function  Description: Evaluate passive and active range of motion, motor control, coordination and muscle tone.  Prevent and address shoulder pain and subluxation with interventions, such as careful positioning and handling techniques, ROM (range of motion) in protected ranges and support devices.  Implement multimodal therapeutic positioning and ROM (range of motion) interventions to minimize contracture risk and improve ROM (range of motion); ensure edema is managed.  Involve patient and parent/caregiver in treatment planning and implementation; consider both neurologic and developmental context in assessment and treatment.  Incorporate individualized, repetitive treatment activities that are meaningful and goal-directed.  Consider interventions to improve motor control and function, such as constraint-induced motor therapy or modified version, robotics, virtual reality or FES (functional electrical stimulation).  If spasticity develops, quantify with a validated scale and implement interventions, such as positioning, range of motion and stretching.  Recent Flowsheet Documentation  Taken 2/8/2023 0400 by Justus Gleason RN  Positioning/Transfer Devices:   pillows   in use  Taken 2/8/2023 0010 by Justus Gleason RN  Positioning/Transfer Devices:   pillows   in use  Taken 2/7/2023 2000 by Justus Gleason RN  Positioning/Transfer Devices:   pillows   in use   Goal Outcome Evaluation:

## 2023-02-08 NOTE — THERAPY TREATMENT NOTE
Acute Care - Physical Therapy Treatment Note  [facility]     Patient Name: Jaime Mckeon  : 1950  MRN: [id number]  Today's Date: 2023   Onset of Illness/Injury or Date of Surgery: 23  Visit Dx:     ICD-10-CM ICD-9-CM   1. Cerebrovascular accident (CVA), unspecified mechanism (HCC)  I63.9 434.91   2. At risk for impaired communication  Z91.89 V15.89   3. Impaired mobility  Z74.09 799.89   4. Decreased activities of daily living (ADL)  Z78.9 V49.89     Patient Active Problem List   Diagnosis   • Encounter for screening for malignant neoplasm of colon   • Stroke (HCC)   • Cerebrovascular accident (CVA), unspecified mechanism (HCC)   • Essential hypertension     Past Medical History:   Diagnosis Date   • Allergic rhinitis    • Arthritis    • BPH (benign prostatic hyperplasia)    • Disease of thyroid gland    • Hyperlipidemia    • Hypertension    • Macular degeneration, wet (HCC)     wet in right  dry in left   • Obesity      Past Surgical History:   Procedure Laterality Date   • CATARACT EXTRACTION     • COLONOSCOPY  2006    Hemorrhoids   • COLONOSCOPY N/A 2022    Procedure: COLONOSCOPY WITH ANESTHESIA;  Surgeon: Elan Davis MD;  Location: [facility] ENDOSCOPY;  Service: Gastroenterology;  Laterality: N/A;  pre: screen  post: polyps. diverticulosis.   Ernesto Swanson MD   • ENDOSCOPY  2006    Grade A esophagitis, small HH; Pt states he does not remember this   • VASECTOMY       PT Assessment (last 12 hours)     PT Evaluation and Treatment     Row Name 23          Physical Therapy Time and Intention    Subjective Information no complaints  -MANDEEP     Document Type therapy note (daily note)  -MANDEEP     Mode of Treatment physical therapy  -MANDEEP     Row Name 23 0813          General Information    Patient/Family/Caregiver Comments/Observations spous  -MANDEEP     Existing Precautions/Restrictions fall  -MANDEEP     Row Name 23          Pain    Pretreatment Pain Rating 0/10  - no pain  -     Posttreatment Pain Rating 0/10 - no pain  -[facility] Name 02/08/23 0813          Bed Mobility    Supine-Sit [place] (Bed Mobility) independent  -MANDEEP     Sit-Supine [place] (Bed Mobility) independent  -MANDEEP     Comment, (Bed Mobility) flat bed  -[facility] Name 02/08/23 0813          Sit-Stand Transfer    Sit-Stand [place] (Transfers) standby assist  -[facility] Name 02/08/23 0813          Stand-Sit Transfer    Stand-Sit [place] (Transfers) verbal cues;standby assist  -MANDEEP     Row Name 02/08/23 0813          Gait/Stairs (Locomotion)    [place] Level (Gait) contact guard  -MANDEEP     Distance in Feet (Gait) 225  -     Pattern (Gait) swing-through  -     Deviations/Abnormal Patterns (Gait) ataxic;stride length decreased;weight shifting decreased  ataxia slight at first, increased with fatigue.  -[facility] Name 02/08/23 0813          Balance    Dynamic Standing Balance 1-person assist;contact guard  -MANDEEP     Position/Device Used, Standing Balance supported;unsupported  -     Balance Interventions standing;dynamic;core stability exercise;dynamic reaching;narrowed base of support  side steps/ backwards/braiding.  -[facility] Name 02/08/23 0813          Motor Skills    Therapeutic Exercise knee;ankle;hip  -MANDEEP     Row Name 02/08/23 0813          Hip (Therapeutic Exercise)    Hip (Therapeutic Exercise) AROM (active range of motion)  -     Hip AROM (Therapeutic Exercise) bilateral;flexion;extension;aBduction;aDduction  -MANDEEP     Row Name 02/08/23 0813          Knee (Therapeutic Exercise)    Knee (Therapeutic Exercise) AROM (active range of motion)  -     Knee AROM (Therapeutic Exercise) bilateral;LAQ (long arc quad)  -MANDEEP     Row Name 02/08/23 0813          Ankle (Therapeutic Exercise)    Ankle (Therapeutic Exercise) AROM (active range of motion)  -     Ankle AROM (Therapeutic Exercise) bilateral;dorsiflexion;plantarflexion  -MANDEEP     Row Name 02/08/23 0813          Positioning and  Restraints    Pre-Treatment Position in bed  -MANDEEP     Post Treatment Position chair  -MANDEEP     In Chair notified nsg;reclined;call light within reach;encouraged to call for assist  -MANDEEP           User Key  (r) = Recorded By, (t) = Taken By, (c) = Cosigned By    Initials Name Provider Type    Aimee Littlejohn PTA Physical Therapist Assistant                Physical Therapy Education     Title: PT OT SLP Therapies (In Progress)     Topic: Physical Therapy (In Progress)     Point: Mobility training (Done)     Learning Progress Summary           Patient Acceptance, E,D, VU by MANDEEP at 2/8/2023 0847    Comment: Awareness of surrondings. aware of L Le weakness.    Acceptance, E, VU,DU by SWAPNA at 2/7/2023 1313    Comment: benefits of activity, progression of PT   Significant Other Acceptance, E, VU,DU by SWAPNA at 2/7/2023 1313    Comment: benefits of activity, progression of PT                   Point: Home exercise program (Not Started)     Learner Progress:  Not documented in this visit.          Point: Body mechanics (Not Started)     Learner Progress:  Not documented in this visit.          Point: Precautions (Not Started)     Learner Progress:  Not documented in this visit.                      User Key     Initials Effective Dates Name Provider Type Discipline    SWAPNA 02/03/23 -  Warren Soria PT DPT Physical Therapist PT    MANDEEP 02/03/23 -  Aimee Veloz PTA Physical Therapist Assistant PT              PT Recommendation and Plan         Outcome Measures     Row Name 02/08/23 0800 02/07/23 1400          How much help from another person do you currently need...    Turning from your back to your side while in flat bed without using bedrails? 4  -MANDEEP --     Moving from lying on back to sitting on the side of a flat bed without bedrails? 4  -MANDEEP --     Moving to and from a bed to a chair (including a wheelchair)? 4  -MANDEEP --     Standing up from a chair using your arms (e.g., wheelchair, bedside chair)? 4  -MANDEEP --      Climbing 3-5 steps with a railing? 3  -MANDEEP --     To walk in hospital room? 3  -MANDEEP --     AM-PAC 6 Clicks Score (PT) 22  -MANDEEP --        How much help from another is currently needed...    Putting on and taking off regular lower body clothing? -- 3  -JJ (r) EC (t) JJ (c)     Bathing (including washing, rinsing, and drying) -- 3  -JJ (r) EC (t) JJ (c)     Toileting (which includes using toilet bed pan or urinal) -- 3  -JJ (r) EC (t) JJ (c)     Putting on and taking off regular upper body clothing -- 3  -JJ (r) EC (t) JJ (c)     Taking care of personal grooming (such as brushing teeth) -- 3  -JJ (r) EC (t) JJ (c)     Eating meals -- 4  -JJ (r) EC (t) JJ (c)     AM-PAC 6 Clicks Score (OT) -- 19  -JJ (r) EC (t)        Functional Assessment    Outcome Measure Options -- AM-PAC 6 Clicks Daily Activity (OT)  -JJ (r) EC (t) JJ (c)           User Key  (r) = Recorded By, (t) = Taken By, (c) = Cosigned By    Initials Name Provider Type    Aimee Littlejohn PTA Physical Therapist Assistant    Sophie Antonio, OTR/L, CSRS Occupational Therapist    EC Milagros Campo, OT Student OT Student                 Time Calculation:    PT Charges     Row Name 02/08/23 0851             Time Calculation    Start Time 0813  -MANDEEP      Stop Time 0836  -MANDEEP      Time Calculation (min) 23 min  -MANDEEP         Timed Charges    49651 - PT Therapeutic Exercise Minutes 14  -MANDEEP      19049 - Gait Training Minutes  9  -MANDEEP         Total Minutes    Timed Charges Total Minutes 23  -MANDEEP       Total Minutes 23  -MANDEEP            User Key  (r) = Recorded By, (t) = Taken By, (c) = Cosigned By    Initials Name Provider Type    Aimee Littlejohn PTA Physical Therapist Assistant              Therapy Charges for Today     Code Description Service Date Service Provider Modifiers Qty    58319826516 HC GAIT TRAINING EA 15 MIN 2/8/2023 Aimee Veloz PTA GP 1    81921033392 HC PT THER PROC EA 15 MIN 2/8/2023 Aimee Velzo, PTA GP 1          PT  G-Codes  Outcome Measure Options: AM-PAC 6 Clicks Daily Activity (OT)  AM-PAC 6 Clicks Score (PT): 22  AM-PAC 6 Clicks Score (OT): 19    Aimee Veloz, PTA  2/8/2023

## 2023-02-08 NOTE — PLAN OF CARE
Goal Outcome Evaluation:      Patient was independent with bed mobility from a flat bed. Stood and sat with SBA. LE exercises. Placing L foot at specific spots which went well. Ambulated 225 with CGA. Left LE ataxia faint at first but increased with fatigue. Patient performed backwards gait, side stepping and braiding with CGA. Standing with narrow base of support and reaching with min assist. Tolerates all well. Will benefit from balance activities.

## 2023-02-08 NOTE — PLAN OF CARE
Goal Outcome Evaluation:              Outcome Evaluation: Patient with stable neuro status.  Unable to complete MRI even with being medicated due to claustrophobia.  CT scan for scheduled for 2030 tonight.  No complaints of pain. VSS.

## 2023-02-08 NOTE — DISCHARGE SUMMARY
DISCHARGE SUMMARY       Date of Admission: 2/6/2023  Date of Discharge:  2/8/2023  Primary Care Physician: Ernesto Swanson MD    Discharge Diagnoses:    Stroke (HCC)    Cerebrovascular accident (CVA), unspecified mechanism (HCC)    Essential hypertension        Presenting Problem/History of Present Illness  Stroke (HCC) [I63.9]  Stroke (HCC) [I63.9]  Cerebrovascular accident (CVA), unspecified mechanism (HCC) [I63.9]       Hospital Course  Patient presented to the hospital with acute onset of left arm weakness.  He called EMS was brought to the hospital.  He was evaluated by neurology and had CT and CT perfusion imaging.  He had some residual symptoms and was administered tPA.  He was unable to undergo MRI due to severe claustrophobia.  CT angiogram of the neck showed no significant carotid disease.  Echocardiogram was unremarkable.  He has had no arrhythmia or A-fib while in the hospital.  Blood pressures been overall well controlled on home meds.  His lipids were excellent at baseline.  He has been started on daily aspirin and statin.  He is going to be discharged home with a ZIO monitor.  Neurology recommends that if ZIO monitor does not show any arrhythmia he should be considered for a Linq recorder given high concern for cardioembolic stroke and A-fib.  At the time of discharge he is neurologically back to baseline overall.  Recommendation has been made for outpatient therapy.    Procedures Performed         Consults:   Consults     No orders found from 1/8/2023 to 2/7/2023.          Pertinent Test Results:  Lab Results (most recent)     Procedure Component Value Units Date/Time    Lipid Panel [[id number]] Collected: 02/07/23 0420    Specimen: Blood Updated: 02/07/23 0515     Total Cholesterol 132 mg/dL      Triglycerides 103 mg/dL      HDL Cholesterol 44 mg/dL      LDL Cholesterol  69 mg/dL      VLDL Cholesterol 19 mg/dL      LDL/HDL Ratio 1.53    Narrative:      Cholesterol Reference Ranges  (U.S.  Department of Health and Human Services ATP III Classifications)    Desirable          <200 mg/dL  Borderline High    200-239 mg/dL  High Risk          >240 mg/dL      Triglyceride Reference Ranges  (U.S. Department of Health and Human Services ATP III Classifications)    Normal           <150 mg/dL  Borderline High  150-199 mg/dL  High             200-499 mg/dL  Very High        >500 mg/dL    HDL Reference Ranges  (U.S. Department of Health and Human Services ATP III Classifications)    Low     <40 mg/dl (major risk factor for CHD)  High    >60 mg/dl ('negative' risk factor for CHD)        LDL Reference Ranges  (U.S. Department of Health and Human Services ATP III Classifications)    Optimal          <100 mg/dL  Near Optimal     100-129 mg/dL  Borderline High  130-159 mg/dL  High             160-189 mg/dL  Very High        >189 mg/dL    Hemoglobin A1c [[id number]]  (Normal) Collected: 02/07/23 0420    Specimen: Blood Updated: 02/07/23 0504     Hemoglobin A1C 5.50 %     Narrative:      Hemoglobin A1C Ranges:    Increased Risk for Diabetes  5.7% to 6.4%  Diabetes                     >= 6.5%  Diabetic Goal                < 7.0%    [place] Draw [[id number]] Collected: 02/06/23 1820    Specimen: Blood Updated: 02/06/23 2230    Narrative:      The following orders were created for panel order [place] Draw.  Procedure                               Abnormality         Status                     ---------                               -----------         ------                     Red Top[[id number]]                                          Final result               Nicole Top[[id number]]                                         Final result                 Please view results for these tests on the individual orders.    Gray Top [[id number]] Collected: 02/06/23 1830    Specimen: Blood Updated: 02/06/23 2230     Extra Tube Hold for add-ons.     Comment: Auto resulted.       Urinalysis With Microscopic If Indicated (No Culture) -  Urine, Clean Catch [[id number]]  (Normal) Collected: 02/06/23 1953    Specimen: Urine, Clean Catch Updated: 02/06/23 2012     Color, UA Yellow     Appearance, UA Clear     pH, UA 7.0     Specific Gravity, UA >=1.030     Glucose, UA Negative     Ketones, UA Negative     Bilirubin, UA Negative     Blood, UA Negative     Protein, UA Negative     Leuk Esterase, UA Negative     Nitrite, UA Negative     Urobilinogen, UA 0.2 E.U./dL    Narrative:      Urine microscopic not indicated.    Red Top [[id number]] Collected: 02/06/23 1820    Specimen: Blood Updated: 02/06/23 1930     Extra Tube Hold for add-ons.     Comment: Auto resulted.       C-reactive Protein [[id number]]  (Normal) Collected: 02/06/23 1820    Specimen: Blood Updated: 02/06/23 1920     C-Reactive Protein <0.30 mg/dL     Comprehensive Metabolic Panel [[id number]]  (Abnormal) Collected: 02/06/23 1820    Specimen: Blood Updated: 02/06/23 1917     Glucose 118 mg/dL      BUN 24 mg/dL      Creatinine 1.05 mg/dL      Sodium 142 mmol/L      Potassium 3.9 mmol/L      Comment: Slight hemolysis detected by analyzer. Results may be affected.        Chloride 107 mmol/L      CO2 23.0 mmol/L      Calcium 9.4 mg/dL      Total Protein 7.4 g/dL      Albumin 4.7 g/dL      ALT (SGPT) 26 U/L      AST (SGOT) 28 U/L      Alkaline Phosphatase 115 U/L      Total Bilirubin 0.2 mg/dL      Globulin 2.7 gm/dL      A/G Ratio 1.7 g/dL      BUN/Creatinine Ratio 22.9     Anion Gap 12.0 mmol/L      eGFR 75.9 mL/min/1.73     Narrative:      GFR Normal >60  Chronic Kidney Disease <60  Kidney Failure <15    The GFR formula is only valid for adults with stable renal function between ages 18 and 70.    CK [[id number]]  (Abnormal) Collected: 02/06/23 1820    Specimen: Blood Updated: 02/06/23 1916     Creatine Kinase 253 U/L     Troponin [[id number]]  (Normal) Collected: 02/06/23 1820    Specimen: Blood Updated: 02/06/23 1913     Troponin T <0.010 ng/mL     Narrative:      Troponin T Reference Range:  <=  0.03 ng/mL-   Negative for AMI  >0.03 ng/mL-     Abnormal for myocardial necrosis.  Clinicians would have to utilize clinical acumen, EKG, Troponin and serial changes to determine if it is an Acute Myocardial Infarction or myocardial injury due to an underlying chronic condition.       Results may be falsely decreased if patient taking Biotin.      Magnesium [[id number]]  (Normal) Collected: 02/06/23 1820    Specimen: Blood Updated: 02/06/23 1911     Magnesium 2.0 mg/dL     Sedimentation Rate [[id number]]  (Normal) Collected: 02/06/23 1820    Specimen: Blood Updated: 02/06/23 1906     Sed Rate 8 mm/hr     Protime-INR [[id number]]  (Normal) Collected: 02/06/23 1830    Specimen: Blood Updated: 02/06/23 1902     Protime 13.5 Seconds      INR 1.02    aPTT [[id number]]  (Abnormal) Collected: 02/06/23 1830    Specimen: Blood Updated: 02/06/23 1902     PTT 23.7 seconds     CBC & Differential [[id number]]  (Abnormal) Collected: 02/06/23 1820    Specimen: Blood Updated: 02/06/23 1853    Narrative:      The following orders were created for panel order CBC & Differential.  Procedure                               Abnormality         Status                     ---------                               -----------         ------                     CBC Auto Differential[[id number]]        Abnormal            Final result                 Please view results for these tests on the individual orders.    CBC Auto Differential [[id number]]  (Abnormal) Collected: 02/06/23 1820    Specimen: Blood Updated: 02/06/23 1853     WBC 8.02 10*3/mm3      RBC 4.40 10*6/mm3      Hemoglobin 13.3 g/dL      Hematocrit 40.6 %      MCV 92.3 fL      MCH 30.2 pg      MCHC 32.8 g/dL      RDW 13.4 %      RDW-SD 45.8 fl      MPV 11.1 fL      Platelets 243 10*3/mm3      Neutrophil % 46.0 %      Lymphocyte % 38.4 %      Monocyte % 12.2 %      Eosinophil % 2.5 %      Basophil % 0.7 %      Immature Grans % 0.2 %      Neutrophils, Absolute 3.68 10*3/mm3       Lymphocytes, Absolute 3.08 10*3/mm3      Monocytes, Absolute 0.98 10*3/mm3      Eosinophils, Absolute 0.20 10*3/mm3      Basophils, Absolute 0.06 10*3/mm3      Immature Grans, Absolute 0.02 10*3/mm3      nRBC 0.0 /100 WBC     POC Glucose Once [[id number]]  (Normal) Collected: 02/06/23 1830    Specimen: Blood Updated: 02/06/23 1841     Glucose 115 mg/dL      Comment: : [id number] Melvin Powell ID: [id number]             Condition on Discharge: Stable and improved    Discharge Disposition  Home or Self Care    Discharge Medications     Discharge Medications      New Medications      Instructions Start Date   aspirin 325 MG tablet   325 mg, Oral, Daily   Start Date: February 9, 2023        Continue These Medications      Instructions Start Date   amLODIPine 10 MG tablet  Commonly known as: NORVASC   10 mg, Oral, Daily      atorvastatin 40 MG tablet  Commonly known as: LIPITOR   40 mg, Oral, Daily      latanoprost 0.005 % ophthalmic solution  Commonly known as: XALATAN   1 drop, Both Eyes, Nightly      levothyroxine 25 MCG tablet  Commonly known as: SYNTHROID, LEVOTHROID   25 mcg, Oral, Daily      losartan 100 MG tablet  Commonly known as: COZAAR   100 mg, Oral, Daily      PRESERVISION AREDS 2 PO   1 tablet, Oral, 2 Times Daily             Discharge Diet:   Diet Instructions     Diet: Cardiac      Discharge Diet: Cardiac          Discharge Care Plan / Instructions:    Activity at Discharge:   Activity Instructions     Activity as Tolerated            Follow-up Appointments  No future appointments.  Additional Instructions for the Follow-ups that You Need to Schedule     Discharge Follow-up with PCP   As directed       Currently Documented PCP:    Ernesto Swanson MD    PCP Phone Number:    [phone]     Follow Up Details: 1 week               Test Results Pending at Discharge       Ernesto Swanson MD  02/08/23  12:12 CST        Part of this note may be an electronic transcription/translation of  spoken language to printed text using the Dragon Dictation System.

## 2023-02-08 NOTE — THERAPY DISCHARGE NOTE
Acute Care - Occupational Therapy Discharge  [facility]    Patient Name: Jaime Mckeon  : 1950    MRN: [id number]                              Today's Date: 2023       Admit Date: 2023    Visit Dx:     ICD-10-CM ICD-9-CM   1. Cerebrovascular accident (CVA), unspecified mechanism (HCC)  I63.9 434.91   2. At risk for impaired communication  Z91.89 V15.89   3. Impaired mobility  Z74.09 799.89   4. Decreased activities of daily living (ADL)  Z78.9 V49.89   5. Cerebrovascular accident (CVA) due to thrombosis of right middle cerebral artery (HCC)  I63.311 434.01   6. Enlarged LA (left atrium)  I51.7 429.3     Patient Active Problem List   Diagnosis   • Encounter for screening for malignant neoplasm of colon   • Stroke (HCC)   • Cerebrovascular accident (CVA), unspecified mechanism (HCC)   • Essential hypertension     Past Medical History:   Diagnosis Date   • Allergic rhinitis    • Arthritis    • BPH (benign prostatic hyperplasia)    • Disease of thyroid gland    • Hyperlipidemia    • Hypertension    • Macular degeneration, wet (HCC)     wet in right  dry in left   • Obesity      Past Surgical History:   Procedure Laterality Date   • CATARACT EXTRACTION     • COLONOSCOPY  2006    Hemorrhoids   • COLONOSCOPY N/A 2022    Procedure: COLONOSCOPY WITH ANESTHESIA;  Surgeon: Elan Davis MD;  Location: [facility] ENDOSCOPY;  Service: Gastroenterology;  Laterality: N/A;  pre: screen  post: polyps. diverticulosis.   Ernesto Swanson MD   • ENDOSCOPY  2006    Grade A esophagitis, small HH; Pt states he does not remember this   • VASECTOMY        General Information     Row Name 2335          OT Time and Intention    Document Type therapy note (daily note)  -JJ     Mode of Treatment occupational therapy  -JJ     Row Name 23          General Information    Patient Profile Reviewed yes  -JJ     Existing Precautions/Restrictions fall  -JJ     Row Name 23           Safety Issues, Functional Mobility    Impairments Affecting Function (Mobility) balance;coordination  -           User Key  (r) = Recorded By, (t) = Taken By, (c) = Cosigned By    Initials Name Provider Type    JJ Joel, Sophie, OTR/L, CSRS Occupational Therapist               Mobility/ADL's     Row Name 02/08/23 0835          Bed Mobility    Comment, (Bed Mobility) up in chair  -     Row Name 02/08/23 0835          Transfers    Transfers sit-stand transfer;stand-sit transfer  -     Comment, (Transfers) x4 reps in total throughout dressing/bathing tasks.  -     Row Name 02/08/23 0835          Sit-Stand Transfer    Sit-Stand Rudd (Transfers) standby assist  -     Row Name 02/08/23 0835          Stand-Sit Transfer    Stand-Sit Rudd (Transfers) verbal cues;standby assist  -[facility] Name 02/08/23 0835          Activities of Daily Living    BADL Assessment/Intervention lower body dressing;upper body dressing;bathing  -[facility] Name 02/08/23 0835          Lower Body Dressing Assessment/Training    [place]udd Level (Lower Body Dressing) doff;don;pants/bottoms;socks;independent  -JJ     Position (Lower Body Dressing) unsupported sitting;unsupported standing  -[facility] Name 02/08/23 0835          Upper Body Dressing Assessment/Training    [place]udd Level (Upper Body Dressing) don;pull-over garment;independent  -JJ     Position (Upper Body Dressing) unsupported standing  -J     [facility] Name 02/08/23 0835          Bathing Assessment/Intervention    Rudd Level (Bathing) upper extremities;proximal lower extremities;distal lower extremities/feet;chest/trunk;perineal area;independent  -JJ     Position (Bathing) unsupported sitting;unsupported standing  -JJ     Comment, (Bathing) with bath wipes  -           User Key  (r) = Recorded By, (t) = Taken By, (c) = Cosigned By    Initials Name Provider Type    JJ Joel, Sophie, OTR/L, CSRS Occupational Therapist                Obj/Interventions     Row Name 02/08/23 1102          Motor Skills    Motor Skills motor control/coordination interventions  -     Motor Control/Coordination Interventions gross motor coordination activities;fine motor manipulation/dexterity activities;other (see comments)  Provided pt with [facility].  -J           User Key  (r) = Recorded By, (t) = Taken By, (c) = Cosigned By    Initials Name Provider Type    Sophie Antonio OTR/L, ALEXIS Occupational Therapist               Goals/Plan     Row Name 02/08/23 0835          Bathing Goal 1 (OT)    Activity/Device (Bathing Goal 1, OT) upper body bathing;lower body bathing;shower chair;long-handled sponge  -JJ     [place] Level/Cues Needed (Bathing Goal 1, OT) supervision required  -JJ     Time Frame (Bathing Goal 1, OT) long term goal (LTG)  -JJ     Progress/Outcomes (Bathing Goal 1, OT) goal met  -     Row Name 02/08/23 0835          Toileting Goal 1 (OT)    Activity/Device (Toileting Goal 1, OT) adjust/manage clothing;perform perineal hygiene;commode;commode, bedside without drop arms  -JJ     [place] Level/Cues Needed (Toileting Goal 1, OT) supervision required  -JJ     Time Frame (Toileting Goal 1, OT) long term goal (LTG)  -JJ     Progress/Outcome (Toileting Goal 1, OT) goal met  -           User Key  (r) = Recorded By, (t) = Taken By, (c) = Cosigned By    Initials Name Provider Type    Sophie Antonio OTR/L, ALEXIS Occupational Therapist               Clinical Impression     Row Name 02/08/23 0835          Pain Assessment    Pretreatment Pain Rating 0/10 - no pain  -JJ     Posttreatment Pain Rating 0/10 - no pain  -JJ     Row Name 02/08/23 0835          Plan of Care Review    Plan of Care Reviewed With patient;spouse  -     Outcome Evaluation OT tx completed. Pt presents alert, sitting up in chair with spouse at bedside. He reports that he is discharging this date home. Pt was provided with [facility]P to assist with coordination  deficits L UEs. He was able to complete TBB with bath wipes, donned socks/pants and pull over shirt independently in unsupported standing/sitting. Left sitting in chair at end of session. Pt to d/c this date home with spouse, would benefit from OP OT/PT services.  -VETO     Row Name 02/08/23 0835          Therapy Plan Review/Discharge Plan (OT)    Anticipated Discharge Disposition (OT) home with outpatient therapy services  -YAMINI     Row Name 02/08/23 0835          Positioning and Restraints    Pre-Treatment Position sitting in chair/recliner  -J     Post Treatment Position chair  -JJ     In Chair notified nsg;reclined;call light within reach;encouraged to call for assist;with family/caregiver  -           User Key  (r) = Recorded By, (t) = Taken By, (c) = Cosigned By    Initials Name Provider Type    Sophie Antonio, OTR/L, CSRS Occupational Therapist               Outcome Measures     Row Name 02/08/23 0835          How much help from another is currently needed...    Putting on and taking off regular lower body clothing? 4  -JJ     Bathing (including washing, rinsing, and drying) 4  -JJ     Toileting (which includes using toilet bed pan or urinal) 4  -JJ     Putting on and taking off regular upper body clothing 4  -JJ     Taking care of personal grooming (such as brushing teeth) 4  -JJ     Eating meals 4  -JJ     AM-PAC 6 Clicks Score (OT) 24  -     Row Name 02/08/23 0801 02/08/23 0800       How much help from another person do you currently need...    Turning from your back to your side while in flat bed without using bedrails? 4  -MW 4  -MANDEEP    Moving from lying on back to sitting on the side of a flat bed without bedrails? 4  -MW 4  -MANDEEP    Moving to and from a bed to a chair (including a wheelchair)? 4  -MW 4  -MANDEEP    Standing up from a chair using your arms (e.g., wheelchair, bedside chair)? 4  -MW 4  -MANDEEP    Climbing 3-5 steps with a railing? 3  -MW 3  -MANDEEP    To walk in hospital room? 3  -MW 3  -MANDEEP     AM-PAC 6 Clicks Score (PT) 22  -MW 22  -MANDEEP    Highest level of mobility 7 --> Walked 25 feet or more  -MW 7 --> Walked 25 feet or more  -MANDEEP    Row Name 02/08/23 0835          Modified [place] Scale    Pre-Stroke Modified [place] Scale 0 - No Symptoms at all.  -J     Modified Angelique Scale 1 - No significant disability despite symptoms.  Able to carry out all usual duties and activities.  -     Row Name 02/08/23 0835          Functional Assessment    Outcome Measure Options Modified Angelique  -           User Key  (r) = Recorded By, (t) = Taken By, (c) = Cosigned By    Initials Name Provider Type    MANDEEP Aimee Veloz, PTA Physical Therapist Assistant    Sophie Antonio, OTR/L, CSRS Occupational Therapist    Elva Aquino, RN Registered Nurse              Occupational Therapy Education     Title: PT OT SLP Therapies (In Progress)     Topic: Occupational Therapy (Done)     Point: Precautions (Done)     Description:   Instruct learner(s) on prescribed precautions during self-care and functional transfers.              Learning Progress Summary           Patient Acceptance, E, VU by VETO at 2/8/2023 1108    Acceptance, E, VU by  at 2/7/2023 1420    Comment: safety during transfers and functional mobility, supportive shoulder positions, use of affected side, benefits of therapy   Significant Other Acceptance, E, VU by EC at 2/7/2023 1420    Comment: safety during transfers and functional mobility, supportive shoulder positions, use of affected side, benefits of therapy                   Point: Body mechanics (Done)     Description:   Instruct learner(s) on proper positioning and spine alignment during self-care, functional mobility activities and/or exercises.              Learning Progress Summary           Patient Acceptance, E, VU by VETO at 2/8/2023 1108    Acceptance, E, VU by  at 2/7/2023 1420    Comment: safety during transfers and functional mobility, supportive shoulder positions, use of affected  side, benefits of therapy   Significant Other Acceptance, E, VU by EC at 2/7/2023 1420    Comment: safety during transfers and functional mobility, supportive shoulder positions, use of affected side, benefits of therapy                               User Key     Initials Effective Dates Name Provider Type Discipline    YAMINI 11/10/21 -  Sophie Joel, OTR/L, CSRS Occupational Therapist OT    EC 12/12/22 -  Milagros Campo, OT Student OT Student OT              OT Recommendation and Plan     Plan of Care Review  Plan of Care Reviewed With: patient, spouse  Outcome Evaluation: OT tx completed. Pt presents alert, sitting up in chair with spouse at bedside. He reports that he is discharging this date home. Pt was provided with [facility] to assist with coordination deficits L UEs. He was able to complete TBB with bath wipes, donned socks/pants and pull over shirt independently in unsupported standing/sitting. Left sitting in chair at end of session. Pt to d/c this date home with spouse, would benefit from OP OT/PT services.  Plan of Care Reviewed With: patient, spouse  Outcome Evaluation: OT tx completed. Pt presents alert, sitting up in chair with spouse at bedside. He reports that he is discharging this date home. Pt was provided with [facility] to assist with coordination deficits L UEs. He was able to complete TBB with bath wipes, donned socks/pants and pull over shirt independently in unsupported standing/sitting. Left sitting in chair at end of session. Pt to d/c this date home with spouse, would benefit from OP OT/PT services.     Time Calculation:    Time Calculation- OT     Row Name 02/08/23 0851 02/08/23 0835          Time Calculation- OT    OT Start Time -- 0832  -J     OT Stop Time -- 0910  -     OT Time Calculation (min) -- 38 min  -     Total Timed Code Minutes- OT -- 38 minute(s)  -     OT Received On -- 02/08/23  -        Timed Charges    45247 - Gait Training Minutes  9  -MANDEEP --        Total  Minutes    Timed Charges Total Minutes 9  -MANDEEP --      Total Minutes 9  -MANDEEP --           User Key  (r) = Recorded By, (t) = Taken By, (c) = Cosigned By    Initials Name Provider Type    Aimee Littlejohn PTA Physical Therapist Assistant    Sophie Antonio OTR/L, CSRS Occupational Therapist              Therapy Charges for Today     Code Description Service Date Service Provider Modifiers Qty    97203164586 HC OT EVAL MOD COMPLEXITY 3 2/7/2023 Sophie Joel OTR/L, CSRS GO 1    49158274553 HC OT SELF CARE/MGMT/TRAIN EA 15 MIN 2/8/2023 Sophie Joel OTR/L, CSRS GO 2    [id number]  OT THERAPEUTIC ACT EA 15 MIN 2/8/2023 Sophie Joel OTR/L, KARINAS GO 1             OT Discharge Summary  Anticipated Discharge Disposition (OT): home with outpatient therapy services  Reason for Discharge: Independent  Outcomes Achieved: Refer to plan of care for updates on goals achieved  Discharge Destination: Home with outpatient services    SALAZAR Sofia CSRS  2/8/2023

## 2023-02-08 NOTE — PROGRESS NOTES
Neurology Progress Note      Chief Complaint:  stroke  Length of Stay:  2   Subjective     Subjective:  Patient lying in bed. Wife at bedside. Left arm and leg weakness resolved. CT head at 2100 negative for hemorrhage. Patient not able to tolerate MRI brain even with IV Ativan. Patient received 1st dose ASA 2/7/2023 2127. No c/o this AM. Therapy evaluated patient yesterday and recommending home with assist and may benefit from outpatient therapy.   Patient seen along with Dr. Major via tele neurology    Results for orders placed during the hospital encounter of 02/06/23    Adult Transthoracic Echo Complete W/ Cont if Necessary Per Protocol (With Agitated Saline)    Interpretation Summary  •  Left ventricular systolic function is normal. Left ventricular ejection fraction appears to be 66 - 70%.  •  The right ventricular cavity is dilated. Normal right ventricular systolic function noted.  •  The left atrial cavity is mildly dilated  •  No evidence of a patent foramen ovale. No evidence of an atrial septal defect present. Saline test results are negative for right to left atrial level shunt  •  No hemodynamically significant valvular disease.  •  No evidence of pulmonary hypertension is present.      LDL 69  A1C 5.5    Medications:  Current Facility-Administered Medications   Medication Dose Route Frequency Provider Last Rate Last Admin   • amLODIPine (NORVASC) tablet 10 mg  10 mg Oral Daily Dayday Fay MD   10 mg at 02/08/23 0843   • aspirin tablet 325 mg  325 mg Oral Daily Dayday Fay MD   325 mg at 02/08/23 0843    Or   • aspirin suppository 300 mg  300 mg Rectal Daily Dayday Fay MD       • atorvastatin (LIPITOR) tablet 40 mg  40 mg Oral Daily Dayday Fay MD   40 mg at 02/08/23 0843   • labetalol (NORMODYNE,TRANDATE) 300 mg in sodium chloride 0.9 % 300 mL infusion  0.5-2 mg/min Intravenous Titrated Dayday Fay MD        And   • labetalol (NORMODYNE,TRANDATE) injection 10  mg  10 mg Intravenous Q10 Min PRN Dayday Fay MD       • levothyroxine (SYNTHROID, LEVOTHROID) tablet 25 mcg  25 mcg Oral Q AM Dayday Fay MD   25 mcg at 02/08/23 0505   • losartan (COZAAR) tablet 100 mg  100 mg Oral Daily Dayday Fay MD   100 mg at 02/08/23 0843   • sodium chloride 0.9 % flush 10 mL  10 mL Intravenous PRN Dayday Fay MD       • sodium chloride 0.9 % flush 10 mL  10 mL Intravenous Q12H Dayday Fay MD   10 mL at 02/07/23 2139   • sodium chloride 0.9 % flush 10 mL  10 mL Intravenous PRN Dayday Fay MD       • sodium chloride 0.9 % infusion 40 mL  40 mL Intravenous PRN Dayday Fay MD                 Objective      Vital Signs  Temp:  [97.8 °F (36.6 °C)-98.2 °F (36.8 °C)] 97.9 °F (36.6 °C)  Heart Rate:  [61-95] 79  Resp:  [12-22] 18  BP: (109-152)/(64-94) 141/78    Physical Exam:    HEENT:  neck is supple  CVS:  RRR  Lungs:  CTA - B/L  Abd:  NT/ND  Ext:  no edema  Skin:  no rashes    Pertinent Neuro Exam:  Awake alert and orient x3  No dysarthria no aphasia is  Cranial nerves II through XII intact  Moving all extremities; however, extremely subtle left and and left lower extremity weakness  Deep tendon reflexes are 2+ out of 4 extremities with no pathologic reflexes  Sensory examination reveals no neglect no extinction  Coordination and gait examination show no signs of gross ataxia      Last nurse assessment:     1a. Level of Consciousness: 0-->Alert, keenly responsive  1b. LOC Questions: 0-->Answers both questions correctly  1c. LOC Commands: 0-->Performs both tasks correctly  2. Best Gaze: 0-->Normal  3. Visual: 0-->No visual loss  4. Facial Palsy: 0-->Normal symmetrical movements  5a. Motor Arm, Left: 0-->No drift, limb holds 90 (or 45) degrees for full 10 secs  5b. Motor Arm, Right: 0-->No drift, limb holds 90 (or 45) degrees for full 10 secs  6a. Motor Leg, Left: 0-->No drift, leg holds 30 degree position for full 5 secs  6b. Motor Leg, Right:  0-->No drift, leg holds 30 degree position for full 5 secs  7. Limb Ataxia: 0-->Absent  8. Sensory: 0-->Normal, no sensory loss  9. Best Language: 0-->No aphasia, normal  10. Dysarthria: 0-->Normal  11. Extinction and Inattention (formerly Neglect): 0-->No abnormality    Total (NIH Stroke Scale): 0       Results Review:        CT of head without contrast reviewed by me.  No acute findings.  CT angiography shows no evidence of large vessel occlusion.  CT perfusion shows evidence of right MCA infarct on the Tmax only with the suggested penumbra of 6 mL.  Cerebral blood flow less than 30% is 0 suggesting only a penumbra no active ischemic regions.     Assessment/Plan     Hospital Problem List      Stroke (HCC)    Cerebrovascular accident (CVA), unspecified mechanism (HCC)    Essential hypertension    Impression:  • Right MCA infarct status post tPA on 2/6  • Hypertension  • HLD. LDL at goal at 69  • Left atrium dilatation  • Patient does have some snoring and unsure if observed apnea. Given patient body habitus may be at risk for IZA and will recommend outpatient evaluation.    Plan:  •  mg daily  • Lipitor 80 mg daily. LDL is at goal at 69 but recommend high dose for neuro protective properties  • LA dilatation noted on CATHLEEN. Will orderd 14 day ZIO. If no atrial fibrillation detected will recommend LINQ.   • Systolic blood pressure goal less than 140 as outpatient  • Therapy has evaluated patient and recommending home with assist and may benefit from short course of outpatient therapy. Will defer to attending  • Ok to discharge home today  • Follow up neurology in 3-4 weeks.   • Patient could not tolerate MRI of brain secondary to severe claustrophobia           Jennifer Menon, APRN  02/08/23  09:16 CST

## 2023-02-08 NOTE — PLAN OF CARE
Goal Outcome Evaluation:  Plan of Care Reviewed With: patient, spouse           Outcome Evaluation: OT tx completed. Pt presents alert, sitting up in chair with spouse at bedside. He reports that he is discharging this date home. Pt was provided with C/C HEP to assist with coordination deficits L UEs. He was able to complete TBB with bath wipes, donned socks/pants and pull over shirt independently in unsupported standing/sitting. Left sitting in chair at end of session. Pt to d/c this date home with spouse, would benefit from OP OT/PT services.

## 2023-02-08 NOTE — PLAN OF CARE
Goal Outcome Evaluation:  Problem: Fall Injury Risk  Goal: Absence of Fall and Fall-Related Injury  2/8/2023 0832 by Elva Bearden RN  Reactivated  2/8/2023 0831 by Elva Bearden RN  Outcome: Adequate for Care Transition  2/8/2023 0830 by Elva Bearden RN  Outcome: Met  Intervention: Identify and Manage Contributors  Recent Flowsheet Documentation  Taken 2/8/2023 0800 by Elva Bearden RN  Medication Review/Management: medications reviewed  Intervention: Promote Injury-Free Environment  Recent Flowsheet Documentation  Taken 2/8/2023 0800 by Elva Bearden RN  Safety Promotion/Fall Prevention: safety round/check completed     Problem: Skin Injury Risk Increased  Goal: Skin Health and Integrity  2/8/2023 0832 by Elva Bearden RN  Reactivated  2/8/2023 0831 by Elva Bearden RN  Outcome: Adequate for Care Transition  2/8/2023 0830 by Elva Bearden RN  Outcome: Met  Intervention: Optimize Skin Protection  Recent Flowsheet Documentation  Taken 2/8/2023 0800 by Elva Bearden RN  Head of Bed (HOB) Positioning: HOB at 30-45 degrees     Problem: Cerebral Tissue Perfusion (Stroke, Ischemic/Transient Ischemic Attack)  Goal: Optimal Cerebral Tissue Perfusion  2/8/2023 0832 by Elva Bearden RN  Reactivated  2/8/2023 0831 by Elva Bearden RN  Outcome: Adequate for Care Transition  2/8/2023 0830 by Elva Bearden RN  Outcome: Met     Problem: Functional Ability Impaired (Stroke, Ischemic/Transient Ischemic Attack)  Goal: Optimal Functional Ability  2/8/2023 0832 by Elva Bearden RN  Reactivated  2/8/2023 0831 by Elva Bearden RN  Outcome: Adequate for Care Transition  2/8/2023 0830 by Elva Bearden RN  Outcome: Met  Intervention: Optimize Functional Ability  Recent Flowsheet Documentation  Taken 2/8/2023 0800 by Elva Bearden RN  Activity Management: ambulated outside room     Problem: Sensorimotor Impairment (Stroke, Ischemic/Transient Ischemic Attack)  Goal: Improved Sensorimotor  Function  2/8/2023 0832 by Elva Bearden RN  Reactivated  2/8/2023 0831 by Elva Bearden RN  Outcome: Adequate for Care Transition  2/8/2023 0830 by Elva Bearden RN  Outcome: Met  Intervention: Optimize Range of Motion, Motor Control and Function  Recent Flowsheet Documentation  Taken 2/8/2023 0800 by Elva Bearden RN  Positioning/Transfer Devices:   pillows   in use     Problem: Adult Inpatient Plan of Care  Goal: Plan of Care Review  2/8/2023 0832 by Elva Bearden RN  Reactivated  2/8/2023 0831 by Elva Bearden RN  Outcome: Adequate for Care Transition  2/8/2023 0830 by Elva Bearden RN  Outcome: Met

## 2023-02-09 NOTE — OUTREACH NOTE
Prep Survey    Flowsheet Row Responses   Scientologist facility patient discharged from? [place]   Is LACE score < 7 ? Yes   Eligibility Readm Mgmt   Discharge diagnosis Stroke   Does the patient have one of the following disease processes/diagnoses(primary or secondary)? Stroke   Does the patient have Home health ordered? No   Is there a DME ordered? No   Medication alerts for this patient ASA   Prep survey completed? Yes          Leatha HAMM - Registered Nurse

## 2023-02-09 NOTE — PAYOR COMM NOTE
"REF:       [id number]    [facility]  TONY,   [phone]  OR  FAX   [phone]      Jaime Bernabe (72 y.o. Male)     Date of Birth   1950    Social Security Number       Address   [address] [place] [address]    Home Phone   [phone]    MRN   [id number]       Church   Other    Marital Status                               Admission Date   2/6/23    Admission Type   Emergency    Admitting Provider   Ernesto Swanson MD    Attending Provider       Department, Room/Bed   [facility] INTENSIVE CARE, I003/1       Discharge Date   2/8/2023    Discharge Disposition   Home or Self Care    Discharge Destination                               Attending Provider: (none)   Allergies: No Known Allergies    Isolation: None   Infection: None   Code Status: Prior    Ht: 180.3 cm (71\")   Wt: 111 kg (244 lb 1.6 oz)    Admission Cmt: None   Principal Problem: Stroke (HCC) [I63.9]                 Active Insurance as of 2/6/2023     Primary Coverage     Payor Plan Insurance Group Employer/Plan Group    HUMANA MEDICARE REPLACEMENT HUMANA MEDICARE REPLACEMENT [id number]     Payor Plan Address Payor Plan Phone Number Payor Plan Fax Number Effective Dates    [address] [address] [phone]  1/1/2018 - None Entered    [facility] [id number]       Subscriber Name Subscriber Birth Date Member ID       JAIME BERNABE 1950 [id number]                 Emergency Contacts      (Rel.) Home Phone Work Phone Mobile Phone    YAYO BERNABE (Spouse) [phone] -- --               Discharge Summary      Ernesto Swanson MD at 02/08/23 1212                DISCHARGE SUMMARY       Date of Admission: 2/6/2023  Date of Discharge:  2/8/2023  Primary Care Physician: Ernesto Swanson MD    Discharge Diagnoses:    Stroke (HCC)    Cerebrovascular accident (CVA), unspecified mechanism (HCC)    Essential hypertension        Presenting Problem/History of Present Illness  Stroke " (HCC) [I63.9]  Stroke (HCC) [I63.9]  Cerebrovascular accident (CVA), unspecified mechanism (HCC) [I63.9]       Hospital Course  Patient presented to the hospital with acute onset of left arm weakness.  He called EMS was brought to the hospital.  He was evaluated by neurology and had CT and CT perfusion imaging.  He had some residual symptoms and was administered tPA.  He was unable to undergo MRI due to severe claustrophobia.  CT angiogram of the neck showed no significant carotid disease.  Echocardiogram was unremarkable.  He has had no arrhythmia or A-fib while in the hospital.  Blood pressures been overall well controlled on home meds.  His lipids were excellent at baseline.  He has been started on daily aspirin and statin.  He is going to be discharged home with a ZIO monitor.  Neurology recommends that if ZIO monitor does not show any arrhythmia he should be considered for a Linq recorder given high concern for cardioembolic stroke and A-fib.  At the time of discharge he is neurologically back to baseline overall.  Recommendation has been made for outpatient therapy.    Procedures Performed         Consults:   Consults     No orders found from 1/8/2023 to 2/7/2023.          Pertinent Test Results:  Lab Results (most recent)     Procedure Component Value Units Date/Time    Lipid Panel [[id number]] Collected: 02/07/23 0420    Specimen: Blood Updated: 02/07/23 0515     Total Cholesterol 132 mg/dL      Triglycerides 103 mg/dL      HDL Cholesterol 44 mg/dL      LDL Cholesterol  69 mg/dL      VLDL Cholesterol 19 mg/dL      LDL/HDL Ratio 1.53    Narrative:      Cholesterol Reference Ranges  (U.S. Department of Health and Human Services ATP III Classifications)    Desirable          <200 mg/dL  Borderline High    200-239 mg/dL  High Risk          >240 mg/dL      Triglyceride Reference Ranges  (U.S. Department of Health and Human Services ATP III Classifications)    Normal           <150 mg/dL  Borderline High  150-199  mg/dL  High             200-499 mg/dL  Very High        >500 mg/dL    HDL Reference Ranges  (U.S. Department of Health and Human Services ATP III Classifications)    Low     <40 mg/dl (major risk factor for CHD)  High    >60 mg/dl ('negative' risk factor for CHD)        LDL Reference Ranges  (U.S. Department of Health and Human Services ATP III Classifications)    Optimal          <100 mg/dL  Near Optimal     100-129 mg/dL  Borderline High  130-159 mg/dL  High             160-189 mg/dL  Very High        >189 mg/dL    Hemoglobin A1c [[id number]]  (Normal) Collected: 02/07/23 0420    Specimen: Blood Updated: 02/07/23 0504     Hemoglobin A1C 5.50 %     Narrative:      Hemoglobin A1C Ranges:    Increased Risk for Diabetes  5.7% to 6.4%  Diabetes                     >= 6.5%  Diabetic Goal                < 7.0%    Outing Draw [[id number]] Collected: 02/06/23 1820    Specimen: Blood Updated: 02/06/23 2230    Narrative:      The following orders were created for panel order Outing Draw.  Procedure                               Abnormality         Status                     ---------                               -----------         ------                     Red Top[[id number]]                                          Final result               Nicole Top[[id number]]                                         Final result                 Please view results for these tests on the individual orders.    Gray Top [[id number]] Collected: 02/06/23 1830    Specimen: Blood Updated: 02/06/23 2230     Extra Tube Hold for add-ons.     Comment: Auto resulted.       Urinalysis With Microscopic If Indicated (No Culture) - Urine, Clean Catch [[id number]]  (Normal) Collected: 02/06/23 1953    Specimen: Urine, Clean Catch Updated: 02/06/23 2012     Color, UA Yellow     Appearance, UA Clear     pH, UA 7.0     Specific Gravity, UA >=1.030     Glucose, UA Negative     Ketones, UA Negative     Bilirubin, UA Negative     Blood, UA Negative     Protein,  UA Negative     Leuk Esterase, UA Negative     Nitrite, UA Negative     Urobilinogen, UA 0.2 E.U./dL    Narrative:      Urine microscopic not indicated.    Red Top [[id number]] Collected: 02/06/23 1820    Specimen: Blood Updated: 02/06/23 1930     Extra Tube Hold for add-ons.     Comment: Auto resulted.       C-reactive Protein [[id number]]  (Normal) Collected: 02/06/23 1820    Specimen: Blood Updated: 02/06/23 1920     C-Reactive Protein <0.30 mg/dL     Comprehensive Metabolic Panel [[id number]]  (Abnormal) Collected: 02/06/23 1820    Specimen: Blood Updated: 02/06/23 1917     Glucose 118 mg/dL      BUN 24 mg/dL      Creatinine 1.05 mg/dL      Sodium 142 mmol/L      Potassium 3.9 mmol/L      Comment: Slight hemolysis detected by analyzer. Results may be affected.        Chloride 107 mmol/L      CO2 23.0 mmol/L      Calcium 9.4 mg/dL      Total Protein 7.4 g/dL      Albumin 4.7 g/dL      ALT (SGPT) 26 U/L      AST (SGOT) 28 U/L      Alkaline Phosphatase 115 U/L      Total Bilirubin 0.2 mg/dL      Globulin 2.7 gm/dL      A/G Ratio 1.7 g/dL      BUN/Creatinine Ratio 22.9     Anion Gap 12.0 mmol/L      eGFR 75.9 mL/min/1.73     Narrative:      GFR Normal >60  Chronic Kidney Disease <60  Kidney Failure <15    The GFR formula is only valid for adults with stable renal function between ages 18 and 70.    CK [[id number]]  (Abnormal) Collected: 02/06/23 1820    Specimen: Blood Updated: 02/06/23 1916     Creatine Kinase 253 U/L     Troponin [[id number]]  (Normal) Collected: 02/06/23 1820    Specimen: Blood Updated: 02/06/23 1913     Troponin T <0.010 ng/mL     Narrative:      Troponin T Reference Range:  <= 0.03 ng/mL-   Negative for AMI  >0.03 ng/mL-     Abnormal for myocardial necrosis.  Clinicians would have to utilize clinical acumen, EKG, Troponin and serial changes to determine if it is an Acute Myocardial Infarction or myocardial injury due to an underlying chronic condition.       Results may be falsely decreased if  patient taking Biotin.      Magnesium [[id number]]  (Normal) Collected: 02/06/23 1820    Specimen: Blood Updated: 02/06/23 1911     Magnesium 2.0 mg/dL     Sedimentation Rate [[id number]]  (Normal) Collected: 02/06/23 1820    Specimen: Blood Updated: 02/06/23 1906     Sed Rate 8 mm/hr     Protime-INR [[id number]]  (Normal) Collected: 02/06/23 1830    Specimen: Blood Updated: 02/06/23 1902     Protime 13.5 Seconds      INR 1.02    aPTT [[id number]]  (Abnormal) Collected: 02/06/23 1830    Specimen: Blood Updated: 02/06/23 1902     PTT 23.7 seconds     CBC & Differential [[id number]]  (Abnormal) Collected: 02/06/23 1820    Specimen: Blood Updated: 02/06/23 1853    Narrative:      The following orders were created for panel order CBC & Differential.  Procedure                               Abnormality         Status                     ---------                               -----------         ------                     CBC Auto Differential[[id number]]        Abnormal            Final result                 Please view results for these tests on the individual orders.    CBC Auto Differential [[id number]]  (Abnormal) Collected: 02/06/23 1820    Specimen: Blood Updated: 02/06/23 1853     WBC 8.02 10*3/mm3      RBC 4.40 10*6/mm3      Hemoglobin 13.3 g/dL      Hematocrit 40.6 %      MCV 92.3 fL      MCH 30.2 pg      MCHC 32.8 g/dL      RDW 13.4 %      RDW-SD 45.8 fl      MPV 11.1 fL      Platelets 243 10*3/mm3      Neutrophil % 46.0 %      Lymphocyte % 38.4 %      Monocyte % 12.2 %      Eosinophil % 2.5 %      Basophil % 0.7 %      Immature Grans % 0.2 %      Neutrophils, Absolute 3.68 10*3/mm3      Lymphocytes, Absolute 3.08 10*3/mm3      Monocytes, Absolute 0.98 10*3/mm3      Eosinophils, Absolute 0.20 10*3/mm3      Basophils, Absolute 0.06 10*3/mm3      Immature Grans, Absolute 0.02 10*3/mm3      nRBC 0.0 /100 WBC     POC Glucose Once [[id number]]  (Normal) Collected: 02/06/23 1830    Specimen: Blood Updated: 02/06/23 1844      Glucose 115 mg/dL      Comment: : [id number] Melvin Powell ID: [id number]             Condition on Discharge: Stable and improved    Discharge Disposition  Home or Self Care    Discharge Medications     Discharge Medications      New Medications      Instructions Start Date   aspirin 325 MG tablet   325 mg, Oral, Daily   Start Date: February 9, 2023        Continue These Medications      Instructions Start Date   amLODIPine 10 MG tablet  Commonly known as: NORVASC   10 mg, Oral, Daily      atorvastatin 40 MG tablet  Commonly known as: LIPITOR   40 mg, Oral, Daily      latanoprost 0.005 % ophthalmic solution  Commonly known as: XALATAN   1 drop, Both Eyes, Nightly      levothyroxine 25 MCG tablet  Commonly known as: SYNTHROID, LEVOTHROID   25 mcg, Oral, Daily      losartan 100 MG tablet  Commonly known as: COZAAR   100 mg, Oral, Daily      PRESERVISION AREDS 2 PO   1 tablet, Oral, 2 Times Daily             Discharge Diet:   Diet Instructions     Diet: Cardiac      Discharge Diet: Cardiac          Discharge Care Plan / Instructions:    Activity at Discharge:   Activity Instructions     Activity as Tolerated            Follow-up Appointments  No future appointments.  Additional Instructions for the Follow-ups that You Need to Schedule     Discharge Follow-up with PCP   As directed       Currently Documented PCP:    Ernesto Swanson MD    PCP Phone Number:    [phone]     Follow Up Details: 1 week               Test Results Pending at Discharge       Ernesto Swanson MD  02/08/23  12:12 CST        Part of this note may be an electronic transcription/translation of spoken language to printed text using the Dragon Dictation System.        Electronically signed by Ernesto Swanson MD at 02/08/23 1213       Discharge Order (From admission, onward)     Start     Ordered    02/08/23 1211  Discharge patient  Once        Expected Discharge Date: 02/08/23    Expected Discharge Time: Midday     Discharge Disposition: Home or Self Care    Physician of Record for Attribution - Please select from Treatment Team: CHARBEL MATA [[id number]]    Review needed by CMO to determine Physician of Record: No       Question Answer Comment   Physician of Record for Attribution - Please select from Treatment Team CHARBEL MATA    Review needed by CMO to determine Physician of Record No        02/08/23 1350

## 2023-02-15 ENCOUNTER — READMISSION MANAGEMENT (OUTPATIENT)
Dept: CALL CENTER | Facility: HOSPITAL | Age: 73
End: 2023-02-15
Payer: MEDICARE

## 2023-02-15 NOTE — OUTREACH NOTE
Stroke Week 1 Survey    Flowsheet Row Responses   [facility] patient discharged from? [place]   Does the patient have one of the following disease processes/diagnoses(primary or secondary)? Stroke   Week 1 attempt successful? Yes   Call start time 1523   Unsuccessful attempts Attempt 1   Call end time 1525   Discharge diagnosis Stroke   Is patient permission given to speak with other caregiver? Yes   List who call center can speak with YAYO BERNABE   Person spoke with today (if not patient) and relationship YAYO BERNABE- WIFE   Meds reviewed with patient/caregiver? Yes   Is the patient having any side effects they believe may be caused by any medication additions or changes? No   Does the patient have all medications ordered at discharge? Yes   Is the patient taking all medications as directed (includes completed medication regime)? Yes   Does the patient have a primary care provider?  Yes   Comments regarding PCP PATIENT HAS SEEN HIS PCP   Has the patient kept scheduled appointments due by today? Yes   Has home health visited the patient within 72 hours of discharge? N/A   Psychosocial issues? No   Does the patient require any assistance with activities of daily living such as eating, bathing, dressing, walking, etc.? No   Does the patient have any residual symptoms from stroke/TIA? Yes   Residual symptoms comments MILD WEAKNESS IN LEFT ARM AND LEG, BUT WIFE STATES HE IS DOING STRENGTHENING EXERCISES.    Did the patient receive a copy of their discharge instructions? Yes   Nursing interventions Reviewed instructions with patient   What is the patient's perception of their health status since discharge? Improving   Nursing interventions Nurse provided patient education   Is the patient/caregiver able to teach back the risk factors for a stroke? High blood pressure-goal below 120/80, High Cholesterol, Carotid or other artery disease, History of TIAs   Is the patient/caregiver able to teach back signs and symptoms  related to disease process for when to call PCP? Yes   Is the patient/caregiver able to teach back signs and symptoms related to disease process for when to call 911? Yes   If the patient is a current smoker, are they able to teach back resources for cessation? Not a smoker   Is the patient/caregiver able to teach back the hierarchy of who to call/visit for symptoms/problems? PCP, Specialist, Home health nurse, Urgent Care, ED, 911 Yes   Is the patient able to teach back FAST for Stroke? B alance: Watch for sudden loss of balance, E yes: Check for vision loss, F ace: Look for an uneven smile, A rm: Check if one arm is weak, S peech: Listen for slurred speech, T cathy: Call 9-1-1 right away   Week 1 call completed? Yes          Katie SOLARES - Licensed Nurse

## 2023-02-23 ENCOUNTER — READMISSION MANAGEMENT (OUTPATIENT)
Dept: CALL CENTER | Facility: HOSPITAL | Age: 73
End: 2023-02-23
Payer: MEDICARE

## 2023-02-23 NOTE — OUTREACH NOTE
Stroke Week 2 Survey    Flowsheet Row Responses   [facility] patient discharged from? [place]   Does the patient have one of the following disease processes/diagnoses(primary or secondary)? Stroke   Week 2 attempt successful? Yes   Call start time 1200   Call end time 1201   Discharge diagnosis Stroke   Meds reviewed with patient/caregiver? Yes   Is the patient taking all medications as directed (includes completed medication regime)? Yes   Has the patient kept scheduled appointments due by today? Yes  [PCP ]   Does the patient require any assistance with activities of daily living such as eating, bathing, dressing, walking, etc.? No   Does the patient have any residual symptoms from stroke/TIA? Yes   Residual symptoms comments little weakness on left - better everyday per pt    What is the patient's perception of their health status since discharge? Improving   Is the patient able to teach back FAST for Stroke? B alance: Watch for sudden loss of balance, E yes: Check for vision loss   Is the patient/caregiver able to teach back the risk factors for a stroke? High blood pressure-goal below 120/80   Is the patient/caregiver able to teach back signs and symptoms related to disease process for when to call PCP? Yes   Is the patient/caregiver able to teach back signs and symptoms related to disease process for when to call 911? Yes   Is the patient/caregiver able to teach back the hierarchy of who to call/visit for symptoms/problems? PCP, Specialist, Home health nurse, Urgent Care, ED, 911 Yes   Week 2 call completed? Yes   Revoked No further contact(revokes)-requires comment   Graduated/Revoked comments no issues or complaints-has seen his PCP - states he is doing better everyday and the strength in his left side is improving           Loraine H - Registered Nurse

## 2023-03-04 LAB
MAXIMAL PREDICTED HEART RATE: 149 BPM
STRESS TARGET HR: 127 BPM

## 2023-03-04 PROCEDURE — 93248 EXT ECG>7D<15D REV&INTERPJ: CPT | Performed by: INTERNAL MEDICINE

## 2023-04-19 ENCOUNTER — OFFICE VISIT (OUTPATIENT)
Dept: NEUROLOGY | Facility: CLINIC | Age: 73
End: 2023-04-19
Payer: MEDICARE

## 2023-04-19 VITALS
WEIGHT: 240 LBS | OXYGEN SATURATION: 99 % | HEART RATE: 82 BPM | HEIGHT: 71 IN | DIASTOLIC BLOOD PRESSURE: 70 MMHG | SYSTOLIC BLOOD PRESSURE: 112 MMHG | BODY MASS INDEX: 33.6 KG/M2

## 2023-04-19 DIAGNOSIS — I51.7 ENLARGED LA (LEFT ATRIUM): ICD-10-CM

## 2023-04-19 DIAGNOSIS — I69.30 CHRONIC ISCHEMIC RIGHT MCA STROKE: Primary | ICD-10-CM

## 2023-04-19 DIAGNOSIS — I10 ESSENTIAL HYPERTENSION: ICD-10-CM

## 2023-04-19 RX ORDER — TIMOLOL MALEATE 5 MG/ML
SOLUTION/ DROPS OPHTHALMIC
COMMUNITY
Start: 2023-02-14

## 2023-04-19 NOTE — PROGRESS NOTES
Subjective     Chief Complaint   Patient presents with   • Stroke     Patient states since his hospital discharge he has been doing well, denies new stroke like sx.        Jaime Mckeon is a 73 y.o. male right handed retiree. Patient is here today for hospital follow up for right MCA stroke. He has no residual effects. He is by himself. He is ambulating unassisted. Patient presented on 2/6/2023 with acute onset of left sided weakness and altered sensation. He had rapid improvement but when he returend to ED after CT/CTA/CTP NIHSS was 5. Risk/benefits IV TPA discussed and patient consented to IV TPA. CTP did show right penumbra. Patient is taking  mg daily and denies bleeding. He also continues Lipitor 80 mg daily with no c/o myalgias. He denies new symptoms of unilateral numbness/weakness/facial weakness or altered speech. He did not require outpatient therapy. Patient has no new c/o today. Stroke work up on TTE showed left atrial dilatation. 14 day ZIO was done with no atrial fibrillation detected. Patient does snore and per patient no observed apnea and he wakes refreshed with no tendency to doze during the day. Patient does drink a glass of wine or two each PM and sometimes other liquors.  Patient not able to tolerate MRI brain post IV TPA. Follow up CT head showed no acute process. CTA head and neck no significant stenosis/occlusion or LVO. Patient is former tobacco user stopping more than 30 years ago.     LDL at goal at 69  A1C 5.5      History of Present Illness  2/6/2023 presents with a last seen normal time of 5:30 PM.  He had an acute onset of left arm and leg weakness with some sensory deficits as well.  He presented to the ER but his symptoms were resolving rapidly.  He initially was a code stroke as he had no physical deficits.  He had routine imaging including a CT of his head without contrast which was unremarkable.  He also had a CT angiography and CT perfusion.  When he arrived back to the ER  for reexamination he was found to have NIH of 5.       Current Outpatient Medications   Medication Sig Dispense Refill   • amLODIPine (NORVASC) 10 MG tablet Take 1 tablet by mouth Daily.     • aspirin 325 MG tablet Take 1 tablet by mouth Daily. 30 tablet 0   • atorvastatin (LIPITOR) 40 MG tablet Take 1 tablet by mouth Daily.     • latanoprost (XALATAN) 0.005 % ophthalmic solution Administer 1 drop to both eyes Every Night.     • levothyroxine (SYNTHROID, LEVOTHROID) 25 MCG tablet Take 1 tablet by mouth Daily.     • losartan (COZAAR) 100 MG tablet Take 1 tablet by mouth Daily.     • Multiple Vitamins-Minerals (PRESERVISION AREDS 2 PO) Take 1 tablet by mouth 2 (Two) Times a Day.     • timolol (TIMOPTIC) 0.5 % ophthalmic solution INSTILL 1 DROP IN RIGHT EYE EVERY MORNING.       No current facility-administered medications for this visit.       Past Medical History:   Diagnosis Date   • Allergic rhinitis    • Arthritis    • BPH (benign prostatic hyperplasia)    • Disease of thyroid gland    • Hyperlipidemia    • Hypertension    • Macular degeneration, wet     wet in right  dry in left   • Obesity        Past Surgical History:   Procedure Laterality Date   • CATARACT EXTRACTION     • COLONOSCOPY  06/27/2006    Hemorrhoids   • COLONOSCOPY N/A 02/22/2022    Procedure: COLONOSCOPY WITH ANESTHESIA;  Surgeon: Elan Davis MD;  Location: [facility] ENDOSCOPY;  Service: Gastroenterology;  Laterality: N/A;  pre: screen  post: polyps. diverticulosis.   Ernesto Swanson MD   • ENDOSCOPY  07/11/2006    Grade A esophagitis, small HH; Pt states he does not remember this   • VASECTOMY         family history is not on file.    Social History     Tobacco Use   • Smoking status: Former     Passive exposure: Past   • Smokeless tobacco: Never   • Tobacco comments:     stopped 35+ years   Vaping Use   • Vaping Use: Never used   Substance Use Topics   • Alcohol use: Yes     Comment: Daily wine   • Drug use: Never       Review of Systems  "  Constitutional: Negative.  Negative for fatigue.   HENT: Negative.  Negative for trouble swallowing.         Snores   Eyes: Negative.    Respiratory: Negative.  Negative for shortness of breath.    Cardiovascular: Negative.    Gastrointestinal: Negative.  Negative for constipation, diarrhea, nausea and vomiting.   Endocrine: Negative.    Genitourinary: Negative.  Negative for dysuria.   Musculoskeletal: Negative for arthralgias, back pain and gait problem.   Skin: Negative.    Allergic/Immunologic: Negative.    Neurological: Negative.  Negative for dizziness and light-headedness.   Hematological: Negative.    Psychiatric/Behavioral: Negative.    All other systems reviewed and are negative.      Objective     /70 (BP Location: Left arm, Patient Position: Sitting, Cuff Size: Large Adult)   Pulse 82   Ht 180.3 cm (71\")   Wt 109 kg (240 lb)   SpO2 99%   BMI 33.47 kg/m² , Body mass index is 33.47 kg/m².    Physical Exam  Vitals and nursing note reviewed.   HENT:      Head: Normocephalic and atraumatic.   Eyes:      General: Lids are normal. No visual field deficit.     Extraocular Movements:      Right eye: Normal extraocular motion and no nystagmus.      Left eye: Normal extraocular motion and no nystagmus.      Pupils: Pupils are equal, round, and reactive to light.   Neck:      Vascular: No carotid bruit.   Cardiovascular:      Rate and Rhythm: Normal rate and regular rhythm.      Heart sounds: Normal heart sounds.   Pulmonary:      Effort: Pulmonary effort is normal.      Breath sounds: Normal breath sounds.   Abdominal:      General: Bowel sounds are normal.      Palpations: Abdomen is soft.   Musculoskeletal:         General: Normal range of motion.      Cervical back: Neck supple.   Skin:     General: Skin is warm and dry.   Neurological:      Mental Status: He is alert and oriented to person, place, and time.      GCS: GCS eye subscore is 4. GCS verbal subscore is 5. GCS motor subscore is 6.      " Cranial Nerves: No dysarthria.      Sensory: No sensory deficit.      Motor: No tremor, abnormal muscle tone or pronator drift.      Coordination: Coordination normal. Finger-Nose-Finger Test and Heel to Shin Test normal.      Gait: Gait normal.      Deep Tendon Reflexes: Reflexes are normal and symmetric.      Comments: Awake, alert. No aphasia, no dysarthria  Completes simple and complex commands    CN II:  Visual fields full.  Pupils equally reactive to light  CN III, IV, VI:  Extraocular Muscles full with no signs of nystagmus  CN V:  Facial sensory is symmetric with no asymetries.  CN VII:  Facial motor symmetric  CN VIII:  Gross hearing intact bilaterally  CN IX:  Palate elevates symmetrically  CN X:  Palate elevates symmetrically  CN XI:  Shoulder shrug symmetric  CN XII:  Tongue is midline on protrusion    Full and symmetric strength bilateral upper and lower extremities.   Psychiatric:         Speech: Speech normal.         Behavior: Behavior normal.         Results for orders placed or performed during the hospital encounter of 02/08/23   Holter Monitor - 72 Hour Up To 15 Days   Result Value Ref Range    Target HR (85%) 127 bpm    Max. Pred. HR (100%) 149 bpm      Adult Transthoracic Echo Complete W/ Cont if Necessary Per Protocol (With Agitated Saline) (02/07/2023 10:21)   Holter Monitor - 72 Hour Up To 15 Days (02/08/2023 13:06)   CT Head Without Contrast Stroke Protocol (02/06/2023 18:39)   CT Angiogram Neck (02/06/2023 18:43)   CT Angiogram Head w AI Analysis of LVO (02/06/2023 18:43)   CT CEREBRAL PERFUSION WITH & WITHOUT CONTRAST (02/06/2023 18:58)   CT Head Without Contrast (02/07/2023 20:59)     ASSESSMENT/PLAN    Diagnoses and all orders for this visit:    Chronic ischemic right MCA stroke    Essential hypertension    Enlarged LA (left atrium)    Other orders  -     timolol (TIMOPTIC) 0.5 % ophthalmic solution; INSTILL 1 DROP IN RIGHT EYE EVERY MORNING.          ICD-10-CM ICD-9-CM   1. Chronic  ischemic right MCA stroke  I69.30 V12.54   2. Essential hypertension  I10 401.9   3. Enlarged LA (left atrium)  I51.7 429.3      PLAN:  1. Continue  mg daily for secondary stroke prevention.  2. Continue Lipitor 80 mg. LDL at goal at 69. Even if LDL becomes low end normal will recommend continue statin for neuroprotective properties.  3. Patient may benefit from long term cardiac monitoring with finding of LA dilatation as this places patient at risk for developing atrial fibrillation. I suspect underlying IZA as well which also places at risk for AFIB.  4. Patient would like to consider repeating 14 day ZIO vs LINQ implant. This can be discussed at next follow up.  5. Systolic BP at goal less than 130.  6. I spent 40  minutes caring for Jaime on this date of service. This time includes time spent by me in the following activities: preparing for the visit, reviewing tests, obtaining and/or reviewing a separately obtained history, performing a medically appropriate examination and/or evaluation, counseling and educating the patient/family/caregiver, documenting information in the medical record and independently interpreting results and communicating that information with the patient/family/caregiver          allergies and all known medications/prescriptions have been reviewed using resources available on this encounter.    Return in about 3 months (around 7/19/2023).        LICO Munoz

## 2023-04-26 ENCOUNTER — CALL CENTER PROGRAMS (OUTPATIENT)
Dept: CALL CENTER | Facility: HOSPITAL | Age: 73
End: 2023-04-26
Payer: MEDICARE

## 2023-07-25 ENCOUNTER — DOCUMENTATION (OUTPATIENT)
Dept: NEUROLOGY | Facility: CLINIC | Age: 73
End: 2023-07-25
Payer: MEDICARE

## 2023-07-26 ENCOUNTER — OFFICE VISIT (OUTPATIENT)
Dept: NEUROLOGY | Facility: CLINIC | Age: 73
End: 2023-07-26
Payer: MEDICARE

## 2023-07-26 VITALS
HEIGHT: 71 IN | HEART RATE: 80 BPM | BODY MASS INDEX: 34.02 KG/M2 | SYSTOLIC BLOOD PRESSURE: 132 MMHG | RESPIRATION RATE: 18 BRPM | DIASTOLIC BLOOD PRESSURE: 80 MMHG | WEIGHT: 243 LBS

## 2023-07-26 DIAGNOSIS — I51.7 ENLARGED LA (LEFT ATRIUM): ICD-10-CM

## 2023-07-26 DIAGNOSIS — Z86.73 HISTORY OF STROKE WITHOUT RESIDUAL DEFICITS: Primary | ICD-10-CM

## 2023-07-26 DIAGNOSIS — I10 ESSENTIAL HYPERTENSION: ICD-10-CM

## 2023-07-26 PROCEDURE — 3079F DIAST BP 80-89 MM HG: CPT

## 2023-07-26 PROCEDURE — 3075F SYST BP GE 130 - 139MM HG: CPT

## 2023-07-26 PROCEDURE — 1160F RVW MEDS BY RX/DR IN RCRD: CPT

## 2023-07-26 PROCEDURE — 1159F MED LIST DOCD IN RCRD: CPT

## 2023-07-26 PROCEDURE — 99213 OFFICE O/P EST LOW 20 MIN: CPT

## 2023-07-26 NOTE — PROGRESS NOTES
Neurology Consult Note  Primary Care Provider:   Ernesto Swanson MD      Reason for Consult:  Stroke Followup    Subjective        Jaime Mckeon presents to [facility]    History of Present Illness  73-year-old male seen for follow-up of right MCA stroke.  Past medical history significant for hyperlipidemia, hypertension, obesity, macular degeneration, thyroid disease.  Per review the medical record he last saw LICO Tan on 4/19/2023.  At that time he had no residual effects.  Patient did present to our facility on 2/6/2023 with acute onset of left-sided weakness and altered sensation.  He did have rapid improvement but once he returned from imaging his NIH increased to 5.  Patient did receive IV thrombolytics.  Stroke dilated left atrium.  Zio patch was done which showed no atrial fibrillation.    Today patient presents by himself.  He has no complaints.  He did see Dr. Swanson this morning for his yearly physical.  Reports everything was normal.  He does continue with aspirin and Lipitor.  He has had no further stroke symptoms.  He is a retired .      Patient Active Problem List   Diagnosis    Encounter for screening for malignant neoplasm of colon    Stroke    Cerebrovascular accident (CVA), unspecified mechanism    Essential hypertension        Past Medical History:   Diagnosis Date    Allergic rhinitis     Arthritis     BPH (benign prostatic hyperplasia)     Disease of thyroid gland     Hyperlipidemia     Hypertension     Macular degeneration, wet     wet in right  dry in left    Obesity         Social History     Socioeconomic History    Marital status:    Tobacco Use    Smoking status: Former     Passive exposure: Past    Smokeless tobacco: Never    Tobacco comments:     stopped 35+ years   Vaping Use    Vaping Use: Never used   Substance and Sexual Activity    Alcohol use: Yes     Comment: Daily wine    Drug use: Never    Sexual activity: Defer     "    No Known Allergies       Current Outpatient Medications:     amLODIPine (NORVASC) 10 MG tablet, Take 1 tablet by mouth Daily., Disp: , Rfl:     aspirin 325 MG tablet, Take 1 tablet by mouth Daily., Disp: 30 tablet, Rfl: 0    atorvastatin (LIPITOR) 40 MG tablet, Take 1 tablet by mouth Daily., Disp: , Rfl:     latanoprost (XALATAN) 0.005 % ophthalmic solution, Administer 1 drop to both eyes Every Night., Disp: , Rfl:     levothyroxine (SYNTHROID, LEVOTHROID) 25 MCG tablet, Take 1 tablet by mouth Daily., Disp: , Rfl:     losartan (COZAAR) 100 MG tablet, Take 1 tablet by mouth Daily., Disp: , Rfl:     Multiple Vitamins-Minerals (PRESERVISION AREDS 2 PO), Take 1 tablet by mouth 2 (Two) Times a Day., Disp: , Rfl:     timolol (TIMOPTIC) 0.5 % ophthalmic solution, INSTILL 1 DROP IN RIGHT EYE EVERY MORNING., Disp: , Rfl:        Objective   Vital Signs:   /80 (BP Location: Left arm, Patient Position: Sitting)   Pulse 80   Resp 18   Ht 180.3 cm (71\")   Wt 110 kg (243 lb)   BMI 33.89 kg/m²       Physical Exam  Vitals and nursing note reviewed.   Constitutional:       General: He is not in acute distress.  HENT:      Head: Normocephalic.   Eyes:      General: Lids are normal.      Extraocular Movements: Extraocular movements intact.      Pupils: Pupils are equal, round, and reactive to light.   Cardiovascular:      Rate and Rhythm: Normal rate and regular rhythm.   Pulmonary:      Effort: Pulmonary effort is normal. No respiratory distress.   Skin:     General: Skin is warm and dry.   Neurological:      Mental Status: He is alert.      Motor: Motor strength is normal.      Deep Tendon Reflexes: Reflexes are normal and symmetric.   Psychiatric:         Speech: Speech normal.      Neurological Exam  Mental Status  Alert. Oriented to person, place, time and situation. Recent and remote memory are intact. Speech is normal. Language is fluent with no aphasia. Attention and concentration are normal.    Cranial " Nerves  CN II: Visual fields full to confrontation. Wears corrective lenses.  CN III, IV, VI: Extraocular movements intact bilaterally. Normal lids and orbits bilaterally. Pupils equal round and reactive to light bilaterally.  CN V: Facial sensation is normal.  CN VII: Full and symmetric facial movement.  CN IX, X: Palate elevates symmetrically. Normal gag reflex.  CN XI: Shoulder shrug strength is normal.  CN XII: Tongue midline without atrophy or fasciculations.    Motor  Normal muscle bulk throughout. No fasciculations present. Normal muscle tone. No abnormal involuntary movements. Strength is 5/5 throughout all four extremities.    Sensory  Light touch is normal in upper and lower extremities.     Reflexes  Deep tendon reflexes are 2+ and symmetric in all four extremities.    Coordination  Right: Finger-to-nose normal.Left: Finger-to-nose normal.    Gait  Casual gait is normal including stance, stride, and arm swing.    Result Review :   The following data was reviewed by: LICO Gamez on 07/26/2023:  CMP          2/6/2023    18:20   CMP   Glucose 118    BUN 24    Creatinine 1.05    EGFR 75.9    Sodium 142    Potassium 3.9    Chloride 107    Calcium 9.4    Total Protein 7.4    Albumin 4.7    Globulin 2.7    Total Bilirubin 0.2    Alkaline Phosphatase 115    AST (SGOT) 28    ALT (SGPT) 26    Albumin/Globulin Ratio 1.7    BUN/Creatinine Ratio 22.9    Anion Gap 12.0      CBC w/diff          2/6/2023    18:20   CBC w/Diff   WBC 8.02    RBC 4.40    Hemoglobin 13.3    Hematocrit 40.6    MCV 92.3    MCH 30.2    MCHC 32.8    RDW 13.4    Platelets 243    Neutrophil Rel % 46.0    Immature Granulocyte Rel % 0.2    Lymphocyte Rel % 38.4    Monocyte Rel % 12.2    Eosinophil Rel % 2.5    Basophil Rel % 0.7      Lipid Panel          2/7/2023    04:20   Lipid Panel   Total Cholesterol 132    Triglycerides 103    HDL Cholesterol 44    VLDL Cholesterol 19    LDL Cholesterol  69    LDL/HDL Ratio 1.53        Most Recent  A1C          2/7/2023    04:20   HGBA1C Most Recent   Hemoglobin A1C 5.50      Office Visit with Jennifer Menon APRN (04/19/2023)     Holter Monitor - 72 Hour Up To 15 Days (02/08/2023 13:06)    Interpretation Summary         A relatively benign monitor study.    Predominant rhythm is normal sinus rhythm.    No sustained arrhythmias.    No symptoms reported, and patient did not trigger the device.    Occasional (4.4%) isolated PACs.  Reading physician: Hammad Miguel MD     Adult Transthoracic Echo Complete W/ Cont if Necessary Per Protocol (With Agitated Saline) (02/07/2023 10:21)  Interpretation Summary         Left ventricular systolic function is normal. Left ventricular ejection fraction appears to be 66 - 70%.    The right ventricular cavity is dilated. Normal right ventricular systolic function noted.    The left atrial cavity is mildly dilated    No evidence of a patent foramen ovale. No evidence of an atrial septal defect present. Saline test results are negative for right to left atrial level shunt    No hemodynamically significant valvular disease.    No evidence of pulmonary hypertension is present.  Reading physician: Kyle Zavala, DO      CT Head Without Contrast Stroke Protocol (02/06/2023 18:39)  Impression:    1. No acute intracranial process.  This report was finalized on 02/06/2023 18:47 by Dr Luis Armando Limon, .    CT Angiogram Neck (02/06/2023 18:43)  IMPRESSION:     1. No arterial occlusion or flow-limiting stenosis in the neck.  2. No intracranial large vessel occlusion.   This report was finalized on 02/06/2023 19:14 by Dr Luis Armando Limon, .    CT Angiogram Head w AI Analysis of LVO (02/06/2023 18:43)  IMPRESSION:     1. No arterial occlusion or flow-limiting stenosis in the neck.  2. No intracranial large vessel occlusion.   This report was finalized on 02/06/2023 19:14 by Dr Luis Armando Limon, .    CT CEREBRAL PERFUSION WITH & WITHOUT CONTRAST (02/06/2023 18:58)  Impression:  1. No  evidence for acute intracranial large vessel occlusion. No  discrete infarct detected by the perfusion software in terms of CBF  abnormality.   This report was finalized on 02/06/2023 19:18 by Dr Luis Armando Limon, .    CT Head Without Contrast (02/07/2023 20:59)  IMPRESSION:  1. No acute intracranial abnormality is seen.  This report was finalized on 02/07/2023 21:05 by Dr. Olaf Delgado MD.                 Impression:  Jaime Mckeon is a 73 y.o. male who presents for follow-up of a right MCA stroke.  He did receive thrombolytics.  His repeat CT scan did not show any acute abnormalities.  Patient unable to tolerate an MRI.  We will continue with secondary stroke preventative measures.  It is concerning that patient's echo showed a dilated left atrium.  He did have 1 Zio patch which was negative.  I will repeat this today.  If negative I will recommend patient being seen by cardiology for possible Lynx placement.  I did discuss with patient this and the rationale.  Discussed if we found atrial fibrillation and the need for change of medications.  He voiced understanding.    Diagnoses and all orders for this visit:    1. History of stroke without residual deficits (Primary)  -     Holter Monitor - 72 Hour Up To 15 Days; Future    2. Essential hypertension    3. Enlarged LA (left atrium)  -     Holter Monitor - 72 Hour Up To 15 Days; Future        Plan:  Continue aspirin 325 mg daily.  Continue atorvastatin 40 mg daily.  Zio patch for 14 days.   LDL goal less than 70.  A1c goal less than 6.5.  BP goal less than 130/80.  Recommend increase activity as tolerated.  Recommend Mediterranean style diet.  Follow-up with me in 6 months.    The patient and I have discussed the plan of care and he is in full agreement at this time.     Follow Up   Return in about 6 months (around 1/26/2024) for Stroke.            LICO Gamez  07/26/23  11:42 CDT

## 2023-08-04 ENCOUNTER — HOSPITAL ENCOUNTER (OUTPATIENT)
Dept: CARDIOLOGY | Facility: HOSPITAL | Age: 73
Discharge: HOME | End: 2023-08-04
Payer: MEDICARE

## 2023-08-04 DIAGNOSIS — Z86.73 HISTORY OF STROKE WITHOUT RESIDUAL DEFICITS: ICD-10-CM

## 2023-08-04 DIAGNOSIS — I51.7 ENLARGED LA (LEFT ATRIUM): ICD-10-CM

## 2023-08-04 PROCEDURE — 93246 EXT ECG>7D<15D RECORDING: CPT

## 2023-09-11 ENCOUNTER — TELEPHONE (OUTPATIENT)
Dept: NEUROLOGY | Facility: CLINIC | Age: 73
End: 2023-09-11
Payer: MEDICARE

## 2023-09-11 NOTE — TELEPHONE ENCOUNTER
----- Message from LICO Gamez sent at 9/8/2023  3:40 PM CDT -----  Please inform patient holter monitor was normal. I would like to consider a loop recorder since no a-fib was seen on two holter monitors. . May need prolonged monitoring. Patient can consider. Does he have a cardiologist?

## 2023-09-11 NOTE — TELEPHONE ENCOUNTER
CALLED PATIENT WITH HIS RESULTS. HE SAID HE WOULD THINK ABOUT GETTING A LOOP RECORDER. ALSO HE SAID HE DOES NOT HAVE A CARDIOLOGIST. HE HAS SEEN DR MITCHELL BECAUSE HE IS A FAMILY FRIEND BUT DOES NOT HAVE AN ESTABLISHED CARDIOLOGIST

## 2024-01-26 ENCOUNTER — TELEPHONE (OUTPATIENT)
Dept: NEUROLOGY | Facility: CLINIC | Age: 74
End: 2024-01-26
Payer: MEDICARE

## 2024-01-29 ENCOUNTER — OFFICE VISIT (OUTPATIENT)
Dept: NEUROLOGY | Facility: CLINIC | Age: 74
End: 2024-01-29
Payer: MEDICARE

## 2024-01-29 VITALS
HEART RATE: 81 BPM | DIASTOLIC BLOOD PRESSURE: 80 MMHG | BODY MASS INDEX: 34.02 KG/M2 | WEIGHT: 243 LBS | HEIGHT: 71 IN | SYSTOLIC BLOOD PRESSURE: 130 MMHG | OXYGEN SATURATION: 98 %

## 2024-01-29 DIAGNOSIS — I51.7 ENLARGED LA (LEFT ATRIUM): ICD-10-CM

## 2024-01-29 DIAGNOSIS — I10 ESSENTIAL HYPERTENSION: ICD-10-CM

## 2024-01-29 DIAGNOSIS — Z86.73 HISTORY OF STROKE WITHOUT RESIDUAL DEFICITS: Primary | ICD-10-CM

## 2024-01-29 PROCEDURE — 3079F DIAST BP 80-89 MM HG: CPT

## 2024-01-29 PROCEDURE — 99214 OFFICE O/P EST MOD 30 MIN: CPT

## 2024-01-29 PROCEDURE — 1160F RVW MEDS BY RX/DR IN RCRD: CPT

## 2024-01-29 PROCEDURE — 1159F MED LIST DOCD IN RCRD: CPT

## 2024-01-29 PROCEDURE — 3075F SYST BP GE 130 - 139MM HG: CPT

## 2024-01-29 RX ORDER — LOSARTAN POTASSIUM AND HYDROCHLOROTHIAZIDE 12.5; 1 MG/1; MG/1
1 TABLET ORAL DAILY
COMMUNITY

## 2024-01-29 NOTE — PROGRESS NOTES
Neurology Consult Note    [facility]  [address], [address]  [place] [address]  Phone: [phone]  Fax: [phone]    Referring Provider:   No ref. provider found  Primary Care Provider:  Ernesto Swanson MD    Reason for Consult:  Stroke   Subjective        Jaime Mckeon presents to [facility]    History of Present Illness  73-year-old male seen for follow-up of stroke.  Patient was last seen in clinic on 7/26/2023.  Patient initially presented for left-sided weakness and altered sensation.  He was unable to tolerate an MRI.  Concern for right MCA stroke.  Patient was maintained on aspirin 325 mg daily as well as atorvastatin 40 mg daily.    Today he presents by himself.  He reports no new stroke symptoms.  No concerns.  No residual deficits.  He has done well since last being seen in clinic.  He continues with his aspirin and atorvastatin.  He did have a Holter monitor that was repeated which showed no atrial fibrillation.  We discussed with the patient the possibility of a loop recorder due to him having a dilated left atrium.  Patient declined wanting this after being explained the risk versus benefits.  Patient Active Problem List   Diagnosis    Encounter for screening for malignant neoplasm of colon    Stroke    Cerebrovascular accident (CVA), unspecified mechanism    Essential hypertension        Past Medical History:   Diagnosis Date    Allergic rhinitis     Arthritis     BPH (benign prostatic hyperplasia)     Disease of thyroid gland     Hyperlipidemia     Hypertension     Macular degeneration, wet     wet in right  dry in left    Obesity         Social History     Socioeconomic History    Marital status:    Tobacco Use    Smoking status: Former     Passive exposure: Past    Smokeless tobacco: Never    Tobacco comments:     stopped 35+ years   Vaping Use    Vaping Use: Never used   Substance and Sexual Activity    Alcohol use: Yes      "Comment: Daily wine    Drug use: Never    Sexual activity: Defer        No Known Allergies       Current Outpatient Medications:     amLODIPine (NORVASC) 10 MG tablet, Take 1 tablet by mouth Daily., Disp: , Rfl:     aspirin 325 MG tablet, Take 1 tablet by mouth Daily., Disp: 30 tablet, Rfl: 0    atorvastatin (LIPITOR) 40 MG tablet, Take 1 tablet by mouth Daily., Disp: , Rfl:     latanoprost (XALATAN) 0.005 % ophthalmic solution, Administer 1 drop to both eyes Every Night., Disp: , Rfl:     levothyroxine (SYNTHROID, LEVOTHROID) 25 MCG tablet, Take 1 tablet by mouth Daily., Disp: , Rfl:     losartan-hydrochlorothiazide (HYZAAR) 100-12.5 MG per tablet, Take 1 tablet by mouth Daily., Disp: , Rfl:     Multiple Vitamins-Minerals (PRESERVISION AREDS 2 PO), Take 1 tablet by mouth 2 (Two) Times a Day., Disp: , Rfl:     timolol (TIMOPTIC) 0.5 % ophthalmic solution, INSTILL 1 DROP IN RIGHT EYE EVERY MORNING., Disp: , Rfl:        Objective   Vital Signs:   /80 (BP Location: Right arm, Patient Position: Sitting, Cuff Size: Adult)   Pulse 81   Ht 180.3 cm (70.98\")   Wt 110 kg (243 lb)   SpO2 98%   BMI 33.91 kg/m²       Physical Exam  Vitals and nursing note reviewed.   Constitutional:       General: He is not in acute distress.     Appearance: He is not ill-appearing.   HENT:      Head: Normocephalic.   Eyes:      General: Lids are normal.      Extraocular Movements: Extraocular movements intact.      Pupils: Pupils are equal, round, and reactive to light.   Pulmonary:      Effort: Pulmonary effort is normal. No respiratory distress.   Skin:     General: Skin is warm and dry.   Neurological:      Mental Status: He is alert.      Motor: Motor strength is normal.     Deep Tendon Reflexes: Reflexes are normal and symmetric.   Psychiatric:         Speech: Speech normal.        Neurological Exam  Mental Status  Alert. Oriented to person, place, time and situation. Speech is normal. Language is fluent with no " aphasia.    Cranial Nerves  CN II: Visual fields full to confrontation.  CN III, IV, VI: Extraocular movements intact bilaterally. Normal lids and orbits bilaterally. Pupils equal round and reactive to light bilaterally.  CN V: Facial sensation is normal.  CN VII: Full and symmetric facial movement.  CN IX, X: Palate elevates symmetrically. Normal gag reflex.  CN XI: Shoulder shrug strength is normal.  CN XII: Tongue midline without atrophy or fasciculations.    Motor  Normal muscle bulk throughout. No fasciculations present. Normal muscle tone. No abnormal involuntary movements. Strength is 5/5 throughout all four extremities.    Sensory  Light touch is normal in upper and lower extremities.     Reflexes  Deep tendon reflexes are 2+ and symmetric in all four extremities.    Gait  Casual gait is normal including stance, stride, and arm swing.      Result Review :   The following data was reviewed by: LICO Gamez on 01/29/2024:  CMP          2/6/2023    18:20   CMP   Glucose 118    BUN 24    Creatinine 1.05    EGFR 75.9    Sodium 142    Potassium 3.9    Chloride 107    Calcium 9.4    Total Protein 7.4    Albumin 4.7    Globulin 2.7    Total Bilirubin 0.2    Alkaline Phosphatase 115    AST (SGOT) 28    ALT (SGPT) 26    Albumin/Globulin Ratio 1.7    BUN/Creatinine Ratio 22.9    Anion Gap 12.0      CBC w/diff          2/6/2023    18:20   CBC w/Diff   WBC 8.02    RBC 4.40    Hemoglobin 13.3    Hematocrit 40.6    MCV 92.3    MCH 30.2    MCHC 32.8    RDW 13.4    Platelets 243    Neutrophil Rel % 46.0    Immature Granulocyte Rel % 0.2    Lymphocyte Rel % 38.4    Monocyte Rel % 12.2    Eosinophil Rel % 2.5    Basophil Rel % 0.7      Lipid Panel          2/7/2023    04:20   Lipid Panel   Total Cholesterol 132    Triglycerides 103    HDL Cholesterol 44    VLDL Cholesterol 19    LDL Cholesterol  69    LDL/HDL Ratio 1.53        Most Recent A1C          2/7/2023    04:20   HGBA1C Most Recent   Hemoglobin A1C 5.50            Progress Notes by Claudia Singer APRN (07/26/2023 11:00)                Impression:  Jaime Mckeon is a 73 y.o. male who presents for follow-up of stroke.  Patient has no residual deficits.  He has been compliant with preventative measures.  He continues on aspirin and atorvastatin.  Patient declines wanting a loop recorder.  I did specifically described the rationale for this.  Due to him having a dilated left atrium we are wanting to assess for atrial fibrillation.  He does not want to move forward with this.  This is understandable.  I do recommend continuing strict secondary stroke preventative measures.  Patient may follow-up to his PCP for stroke prevention.  He may follow-up with our clinic as needed.    Diagnoses and all orders for this visit:    1. History of stroke without residual deficits (Primary)    2. Essential hypertension    3. Enlarged LA (left atrium)        Plan:  Continue aspirin 325 mg daily  Continue atorvastatin 40 mg nightly  LDL goal less than 70.  Currently at goal with 69.  A1c goal less than 6.5.  Currently at goal with 5.5.  BP goal less than 130/80.  Currently at goal with 130/80.  Return to the emergency room for any new stroke symptoms  Recommend Mediterranean-style diet  Continue increasing activity as tolerated  No indication for rehabilitation services as patient has no deficits  Follow-up with PCP as scheduled  Follow-up in our clinic as needed     The patient and I have discussed the plan of care and he is in full agreement at this time.     Follow Up   Return if symptoms worsen or fail to improve, for Stroke.            LICO Gamez  01/29/24  12:14 CST

## 2024-08-20 ENCOUNTER — TRANSCRIBE ORDERS (OUTPATIENT)
Dept: ADMINISTRATIVE | Facility: HOSPITAL | Age: 74
End: 2024-08-20
Payer: MEDICARE

## 2024-08-20 DIAGNOSIS — M54.16 RADICULOPATHY, LUMBAR REGION: Primary | ICD-10-CM

## 2024-10-17 ENCOUNTER — ANESTHESIA (OUTPATIENT)
Dept: MRI IMAGING | Facility: HOSPITAL | Age: 74
End: 2024-10-17
Payer: MEDICARE

## 2024-10-17 ENCOUNTER — HOSPITAL ENCOUNTER (OUTPATIENT)
Dept: MRI IMAGING | Facility: HOSPITAL | Age: 74
Discharge: HOME | End: 2024-10-17
Payer: MEDICARE

## 2024-10-17 ENCOUNTER — ANESTHESIA EVENT (OUTPATIENT)
Dept: MRI IMAGING | Facility: HOSPITAL | Age: 74
End: 2024-10-17
Payer: MEDICARE

## 2024-10-17 VITALS
WEIGHT: 238.76 LBS | HEART RATE: 63 BPM | BODY MASS INDEX: 32.34 KG/M2 | TEMPERATURE: 97.2 F | DIASTOLIC BLOOD PRESSURE: 67 MMHG | SYSTOLIC BLOOD PRESSURE: 124 MMHG | RESPIRATION RATE: 16 BRPM | HEIGHT: 72 IN | OXYGEN SATURATION: 99 %

## 2024-10-17 DIAGNOSIS — M54.16 RADICULOPATHY, LUMBAR REGION: Primary | ICD-10-CM

## 2024-10-17 PROCEDURE — 25010000002 LIDOCAINE (CARDIAC)

## 2024-10-17 PROCEDURE — 25010000002 PROPOFOL 10 MG/ML EMULSION

## 2024-10-17 PROCEDURE — 25810000003 LACTATED RINGERS PER 1000 ML: Performed by: NURSE ANESTHETIST, CERTIFIED REGISTERED

## 2024-10-17 PROCEDURE — 72148 MRI LUMBAR SPINE W/O DYE: CPT

## 2024-10-17 PROCEDURE — 25010000002 VASOPRESSIN 20 UNIT/ML SOLUTION

## 2024-10-17 RX ORDER — ONDANSETRON 2 MG/ML
4 INJECTION INTRAMUSCULAR; INTRAVENOUS ONCE AS NEEDED
Status: DISCONTINUED | OUTPATIENT
Start: 2024-10-17 | End: 2024-10-18 | Stop reason: HOSPADM

## 2024-10-17 RX ORDER — DROPERIDOL 2.5 MG/ML
0.62 INJECTION, SOLUTION INTRAMUSCULAR; INTRAVENOUS ONCE AS NEEDED
Status: DISCONTINUED | OUTPATIENT
Start: 2024-10-17 | End: 2024-10-18 | Stop reason: HOSPADM

## 2024-10-17 RX ORDER — ATROPINE SULFATE 1 MG/ML
0.5 INJECTION, SOLUTION INTRAMUSCULAR; INTRAVENOUS; SUBCUTANEOUS ONCE AS NEEDED
Status: DISCONTINUED | OUTPATIENT
Start: 2024-10-17 | End: 2024-10-18 | Stop reason: HOSPADM

## 2024-10-17 RX ORDER — PROPOFOL 10 MG/ML
VIAL (ML) INTRAVENOUS AS NEEDED
Status: DISCONTINUED | OUTPATIENT
Start: 2024-10-17 | End: 2024-10-17 | Stop reason: SURG

## 2024-10-17 RX ORDER — IBUPROFEN 600 MG/1
600 TABLET, FILM COATED ORAL EVERY 6 HOURS PRN
Status: DISCONTINUED | OUTPATIENT
Start: 2024-10-17 | End: 2024-10-18 | Stop reason: HOSPADM

## 2024-10-17 RX ORDER — LIDOCAINE HYDROCHLORIDE 10 MG/ML
0.5 INJECTION, SOLUTION EPIDURAL; INFILTRATION; INTRACAUDAL; PERINEURAL ONCE AS NEEDED
Status: DISCONTINUED | OUTPATIENT
Start: 2024-10-17 | End: 2024-10-18 | Stop reason: HOSPADM

## 2024-10-17 RX ORDER — LABETALOL HYDROCHLORIDE 5 MG/ML
5 INJECTION, SOLUTION INTRAVENOUS
Status: DISCONTINUED | OUTPATIENT
Start: 2024-10-17 | End: 2024-10-18 | Stop reason: HOSPADM

## 2024-10-17 RX ORDER — SODIUM CHLORIDE, SODIUM LACTATE, POTASSIUM CHLORIDE, CALCIUM CHLORIDE 600; 310; 30; 20 MG/100ML; MG/100ML; MG/100ML; MG/100ML
1000 INJECTION, SOLUTION INTRAVENOUS CONTINUOUS
Status: DISCONTINUED | OUTPATIENT
Start: 2024-10-17 | End: 2024-10-18 | Stop reason: HOSPADM

## 2024-10-17 RX ORDER — SODIUM CHLORIDE 0.9 % (FLUSH) 0.9 %
3 SYRINGE (ML) INJECTION AS NEEDED
Status: DISCONTINUED | OUTPATIENT
Start: 2024-10-17 | End: 2024-10-18 | Stop reason: HOSPADM

## 2024-10-17 RX ADMIN — LIDOCAINE HYDROCHLORIDE 100 MG: 20 INJECTION INTRAVENOUS at 10:21

## 2024-10-17 RX ADMIN — SODIUM CHLORIDE, POTASSIUM CHLORIDE, SODIUM LACTATE AND CALCIUM CHLORIDE 1000 ML: 600; 310; 30; 20 INJECTION, SOLUTION INTRAVENOUS at 09:36

## 2024-10-17 RX ADMIN — PROPOFOL 200 MG: 10 INJECTION, EMULSION INTRAVENOUS at 10:21

## 2024-10-17 NOTE — ANESTHESIA PREPROCEDURE EVALUATION
Anesthesia Evaluation     Patient summary reviewed and Nursing notes reviewed   no history of anesthetic complications:  NPO Solid Status: > 8 hours  NPO Liquid Status: > 8 hours           Airway   Mallampati: I  TM distance: >3 FB  Neck ROM: full  No difficulty expected  Dental      Pulmonary    (+) a smoker Former,   Cardiovascular   Exercise tolerance: excellent (>7 METS)    (+) hypertension well controlled 2 medications or greater, hyperlipidemia,       Neuro/Psych- negative ROS  GI/Hepatic/Renal/Endo    (+) obesity,   thyroid problem hypothyroidism    Musculoskeletal     (+) arthralgias,   Abdominal    Substance History - negative use     OB/GYN          Other   arthritis,                    Anesthesia Plan    ASA 2     MAC     intravenous induction     Anesthetic plan, all risks, benefits, and alternatives have been provided, discussed and informed consent has been obtained with: patient.        CODE STATUS:      Anesthesia Evaluation     Patient summary reviewed and Nursing notes reviewed   NPO Solid Status: > 8 hours  NPO Liquid Status: > 8 hours           Airway   Mallampati: II  TM distance: >3 FB  Neck ROM: full  No difficulty expected  Dental - normal exam     Pulmonary - negative pulmonary ROS and normal exam   Cardiovascular - normal exam    (+) hypertension well controlled 2 medications or greater, hyperlipidemia      Neuro/Psych  (+) CVA  GI/Hepatic/Renal/Endo    (+) obesity, thyroid problem hypothyroidism    Musculoskeletal     (+) back pain  Abdominal  - normal exam   Substance History - negative use     OB/GYN negative ob/gyn ROS         Other   arthritis,                       Anesthesia Plan    ASA 3     general     intravenous induction     Anesthetic plan, risks, benefits, and alternatives have been provided, discussed and informed consent has been obtained with: patient.  Pre-procedure education provided  Plan discussed with CRNA.        CODE STATUS:

## 2024-10-17 NOTE — ANESTHESIA POSTPROCEDURE EVALUATION
"Patient: Jaime Mckeon    Procedure Summary       Date: 10/17/24 Room / Location: [facility] MRI    Anesthesia Start: 1021 Anesthesia Stop: 1100    Procedure: MRI LUMBAR SPINE WO CONTRAST Diagnosis:       Radiculopathy, lumbar region      (M54.16)    Scheduled Providers:  Provider: Esteban Rebollar CRNA    Anesthesia Type: general ASA Status: 3            Anesthesia Type: general    Vitals  Vitals Value Taken Time   /67 10/17/24 1146   Temp 97.2 °F (36.2 °C) 10/17/24 1115   Pulse 63 10/17/24 1157   Resp 16 10/17/24 1123   SpO2 100 % 10/17/24 1157   Vitals shown include unfiled device data.        Post Anesthesia Care and Evaluation    Patient location during evaluation: PACU  Patient participation: complete - patient participated  Level of consciousness: awake and awake and alert  Pain score: 0  Pain management: adequate    Airway patency: patent  Anesthetic complications: No anesthetic complications  PONV Status: none  Cardiovascular status: acceptable  Respiratory status: acceptable  Hydration status: acceptable    Comments: Patient discharged according to acceptable Jennifer score per RN assessment. See nursing records for further information.     Blood pressure 124/67, pulse 63, temperature 97.2 °F (36.2 °C), resp. rate 16, height 182 cm (71.65\"), weight 108 kg (238 lb 12.1 oz), SpO2 99%.      "

## 2024-10-17 NOTE — ANESTHESIA PROCEDURE NOTES
Airway  Urgency: elective    Date/Time: 10/17/2024 10:22 AM  Airway not difficult    General Information and Staff    Patient location during procedure: OR  CRNA/CAA: Esteban Rebollar CRNA    Indications and Patient Condition    Preoxygenated: yes  Mask difficulty assessment: 1 - vent by mask    Final Airway Details  Final airway type: supraglottic airway      Successful airway: I-gel  Size 4     Number of attempts at approach: 1  Assessment: lips, teeth, and gum same as pre-op and atraumatic intubation

## 2024-11-11 ENCOUNTER — OFFICE VISIT (OUTPATIENT)
Age: 74
End: 2024-11-11

## 2024-11-11 VITALS — HEIGHT: 73 IN | WEIGHT: 238 LBS | BODY MASS INDEX: 31.54 KG/M2

## 2024-11-11 DIAGNOSIS — R22.31 FINGER MASS, RIGHT: Primary | ICD-10-CM

## 2024-11-11 RX ORDER — TIMOLOL MALEATE 5 MG/ML
SOLUTION/ DROPS OPHTHALMIC
COMMUNITY
Start: 2024-10-15

## 2024-11-11 RX ORDER — AMLODIPINE BESYLATE 10 MG/1
10 TABLET ORAL DAILY
COMMUNITY

## 2024-11-11 RX ORDER — ERYTHROMYCIN 5 MG/G
OINTMENT OPHTHALMIC
COMMUNITY
Start: 2024-09-03

## 2024-11-11 RX ORDER — LATANOPROST 50 UG/ML
1 SOLUTION/ DROPS OPHTHALMIC NIGHTLY
COMMUNITY

## 2024-11-11 RX ORDER — LEVOTHYROXINE SODIUM 25 UG/1
1 TABLET ORAL EVERY MORNING
COMMUNITY

## 2024-11-11 RX ORDER — ATORVASTATIN CALCIUM 40 MG/1
40 TABLET, FILM COATED ORAL DAILY
COMMUNITY

## 2024-11-11 RX ORDER — LOSARTAN POTASSIUM AND HYDROCHLOROTHIAZIDE 12.5; 1 MG/1; MG/1
1 TABLET ORAL DAILY
COMMUNITY

## 2024-11-11 NOTE — PROGRESS NOTES
[facility]  [facility]  [address]  [facility] [id number] [phone]     Patient: Angle Aguilar   YOB: 1950   Date: 11/11/2024     Chief Complaint   Patient presents with    right hand        History of Present Illness  Angel is a 74 y.o. 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  He states that he has had spontaneous drainage once since recurrence.  He would like to have the mass reexcised again.      No past medical history on file.   No past surgical history on file.   Social History     Socioeconomic History    Marital status:      Social Determinants of Health     Food Insecurity: No Food Insecurity (2/7/2023)    Received from [facility]    Hunger Vital Sign     Worried About Running Out of Food in the Last Year: Never true     Ran Out of Food in the Last Year: Never true    Received from [facility]    Family and Community Support   Intimate Partner Violence: Unknown (10/16/2024)    Received from [facility]    Abuse Screen     Feels Unsafe at Home or Work/School: no     Feels Threatened by Someone: no     Does Anyone Try to Keep You From Having Contact with Others or Doing Things Outside Your Home?: no    Received from [facility]    Housing Stability      Social History     Occupational History    Not on file   Tobacco Use    Smoking status: Not on file    Smokeless tobacco: Not on file   Substance and Sexual Activity    Alcohol use: Not on file    Drug use: Not on file    Sexual activity: Not on file        Tobacco Use      Smoking status: Not on file      Smokeless tobacco: Not on file     No family history on file.     Medications  No current outpatient medications on file.     No current facility-administered medications for this

## 2024-11-19 ENCOUNTER — HOSPITAL ENCOUNTER (OUTPATIENT)
Dept: GENERAL RADIOLOGY | Facility: HOSPITAL | Age: 74
Discharge: HOME | End: 2024-11-19
Payer: MEDICARE

## 2024-11-19 ENCOUNTER — OFFICE VISIT (OUTPATIENT)
Dept: NEUROSURGERY | Facility: CLINIC | Age: 74
End: 2024-11-19
Payer: MEDICARE

## 2024-11-19 VITALS — WEIGHT: 238 LBS | HEIGHT: 72 IN | BODY MASS INDEX: 32.23 KG/M2

## 2024-11-19 DIAGNOSIS — M51.362 DEGENERATION OF INTERVERTEBRAL DISC OF LUMBAR REGION WITH DISCOGENIC BACK PAIN AND LOWER EXTREMITY PAIN: ICD-10-CM

## 2024-11-19 DIAGNOSIS — Z78.9 NONSMOKER: ICD-10-CM

## 2024-11-19 DIAGNOSIS — G89.29 CHRONIC BILATERAL LOW BACK PAIN WITH BILATERAL SCIATICA: Primary | ICD-10-CM

## 2024-11-19 DIAGNOSIS — M48.062 LUMBAR STENOSIS WITH NEUROGENIC CLAUDICATION: ICD-10-CM

## 2024-11-19 DIAGNOSIS — G89.29 CHRONIC BILATERAL LOW BACK PAIN WITH BILATERAL SCIATICA: ICD-10-CM

## 2024-11-19 DIAGNOSIS — M54.42 CHRONIC BILATERAL LOW BACK PAIN WITH BILATERAL SCIATICA: Primary | ICD-10-CM

## 2024-11-19 DIAGNOSIS — E66.09 CLASS 1 OBESITY DUE TO EXCESS CALORIES WITHOUT SERIOUS COMORBIDITY WITH BODY MASS INDEX (BMI) OF 32.0 TO 32.9 IN ADULT: ICD-10-CM

## 2024-11-19 DIAGNOSIS — M54.41 CHRONIC BILATERAL LOW BACK PAIN WITH BILATERAL SCIATICA: Primary | ICD-10-CM

## 2024-11-19 DIAGNOSIS — M54.42 CHRONIC BILATERAL LOW BACK PAIN WITH BILATERAL SCIATICA: ICD-10-CM

## 2024-11-19 DIAGNOSIS — M54.41 CHRONIC BILATERAL LOW BACK PAIN WITH BILATERAL SCIATICA: ICD-10-CM

## 2024-11-19 DIAGNOSIS — E66.811 CLASS 1 OBESITY DUE TO EXCESS CALORIES WITHOUT SERIOUS COMORBIDITY WITH BODY MASS INDEX (BMI) OF 32.0 TO 32.9 IN ADULT: ICD-10-CM

## 2024-11-19 PROCEDURE — 72114 X-RAY EXAM L-S SPINE BENDING: CPT

## 2024-11-19 PROCEDURE — 72082 X-RAY EXAM ENTIRE SPI 2/3 VW: CPT

## 2024-11-19 PROCEDURE — 99215 OFFICE O/P EST HI 40 MIN: CPT | Performed by: NURSE PRACTITIONER

## 2024-11-19 PROCEDURE — G2212 PROLONG OUTPT/OFFICE VIS: HCPCS | Performed by: NURSE PRACTITIONER

## 2024-11-19 RX ORDER — DICLOFENAC SODIUM 75 MG/1
75 TABLET, DELAYED RELEASE ORAL 2 TIMES DAILY
Qty: 60 TABLET | Refills: 2 | Status: SHIPPED | OUTPATIENT
Start: 2024-11-19

## 2024-11-19 RX ORDER — GABAPENTIN 100 MG/1
100 CAPSULE ORAL 3 TIMES DAILY
Qty: 90 CAPSULE | Refills: 2 | Status: SHIPPED | OUTPATIENT
Start: 2024-11-19

## 2024-11-19 NOTE — PROGRESS NOTES
Chief complaint:   Chief Complaint   Patient presents with    Back Pain     Pt is here for constant lbp with tingling in bilateral legs. Pt states he has ([facility]) completed physical therapy. Pt states he has seen a chiropractor last visit 2wks. Pt states he has not had any pain mgmt.       Subjective     HPI: This is a 74-year-old male gentleman who was referred to us by Dr. Ernesto Swanson for back and leg pain.  Is here to be evaluated today.  The patient says the pain in his back is been going on since April 2024 after he planted a tree.  Currently the pain in his back is constant.  Is worse with walking and standing and better with stretching and changing positions.  He does have pain that goes into his legs bilaterally and a radicular fashion to his feet.  The left leg is worse than the right.  The pain in his leg is intermittent and has the same modifying factors as his back.  Denies any bowel or bladder incontinence.  He has not done any pain management injections.  Is attempted chiropractic care and has had a round of [facility] at [facility] within the last 3 months without any significant improvement.  He has been taking Aleve.  He is right-hand dominant.  He is retired.  He is .  Denies any tobacco or illicit drug use.  Does drink alcohol.    Review of Systems   Constitutional:  Positive for activity change.   Musculoskeletal:  Positive for back pain.   Neurological:  Positive for numbness.   Psychiatric/Behavioral: Negative.     All other systems reviewed and are negative.       Past Medical History:   Diagnosis Date    Allergic rhinitis     Arthritis     Back pain 10/2024    BPH (benign prostatic hyperplasia)     Claustrophobia     History of cardioembolic cerebrovascular accident (CVA)     History of transfusion 1974    Has had a blood transfusion, in 1974    Hyperlipidemia     Hypertension     Hypothyroidism     Low back pain     Lumbosacral disc disease     Macular  "degeneration, wet     wet in right  dry in left    Obesity     Stroke     TIA (transient ischemic attack)      Past Surgical History:   Procedure Laterality Date    CATARACT EXTRACTION W/ INTRAOCULAR LENS IMPLANT      COLONOSCOPY  06/27/2006    Hemorrhoids    COLONOSCOPY N/A 02/22/2022    Procedure: COLONOSCOPY WITH ANESTHESIA;  Surgeon: Elan Davis MD;  Location: [facility] ENDOSCOPY;  Service: Gastroenterology;  Laterality: N/A;  pre: screen  post: polyps. diverticulosis.   Ernesto Swanson MD    ENDOSCOPY  07/11/2006    Grade A esophagitis, small HH; Pt states he does not remember this    VASECTOMY Bilateral      Family History   Problem Relation Age of Onset    Arthritis Mother     Diabetes Mother     Cancer Brother     COPD Brother     Heart disease Brother     Vision loss Sister     Colon cancer Neg Hx     Colon polyps Neg Hx      Social History     Tobacco Use    Smoking status: Former     Types: Pipe     Passive exposure: Past    Smokeless tobacco: Never    Tobacco comments:     stopped 35+ years   Vaping Use    Vaping status: Never Used   Substance Use Topics    Alcohol use: Yes     Comment: Daily wine    Drug use: Never     (Not in a hospital admission)    Allergies:  Patient has no known allergies.    Objective      Vital Signs  Ht 182 cm (71.65\")   Wt 108 kg (238 lb)   BMI 32.59 kg/m²     Physical Exam  Constitutional:       General: He is awake.      Appearance: Normal appearance. He is well-developed.   HENT:      Head: Normocephalic.   Eyes:      General: Lids are normal.      Extraocular Movements: Extraocular movements intact.      Conjunctiva/sclera: Conjunctivae normal.      Pupils: Pupils are equal, round, and reactive to light.   Pulmonary:      Effort: Pulmonary effort is normal.      Breath sounds: Normal breath sounds.   Musculoskeletal:         General: Normal range of motion.      Cervical back: Normal range of motion.   Skin:     General: Skin is warm.   Neurological:      Mental " Status: He is alert and oriented to person, place, and time.      GCS: GCS eye subscore is 4. GCS verbal subscore is 5. GCS motor subscore is 6.      Cranial Nerves: No cranial nerve deficit.      Sensory: No sensory deficit.      Motor: Motor strength is normal.     Deep Tendon Reflexes: Reflexes are normal and symmetric. Reflexes normal.   Psychiatric:         Speech: Speech normal.         Behavior: Behavior normal.         Thought Content: Thought content normal.         Neurological Exam  Mental Status  Awake and alert. Oriented to person, place and time. Oriented to person, place, and time. Speech is normal. Language is fluent with no aphasia. Attention and concentration are normal.    Cranial Nerves  CN I: Sense of smell is normal.  CN II: Right normal visual field. Left normal visual field.  CN III, IV, VI: Extraocular movements intact bilaterally. Normal lids and orbits bilaterally. Pupils equal round and reactive to light bilaterally.  CN V: Facial sensation is normal.  CN VII:  Right: There is no facial weakness.  Left: There is no facial weakness.  CN XI: Shoulder shrug strength is normal.  CN XII: Tongue midline without atrophy or fasciculations.    Motor  Normal muscle bulk throughout. Normal muscle tone. Strength is 5/5 throughout all four extremities.    Sensory  Sensation is intact to light touch, pinprick, vibration and proprioception in all four extremities.    Reflexes  Deep tendon reflexes are 2+ and symmetric in all four extremities.    Gait  Normal casual, toe, heel and tandem gait.      Imaging review: MRI of the lumbar spine that was done on October 17, 2024 shows disc degeneration at L4-5 with bilateral foraminal narrowing.  At L3-4 there is severe disc degeneration with bilateral foraminal narrowing with the left being worse than the right.  At L2-3 facet arthropathy with bilateral foraminal narrowing is noted.  At L1-2 facet arthropathy with bilateral foraminal narrowing.  No fracture  visualized.  No cord signal change.  The conus does terminate at L1.    L1-2      L2-3      L3-4      L4-5      Assessment/Plan: The patient is complaining of back pain.  Does have significant disc degeneration and stenosis.  I am going to start him on diclofenac, gabapentin, and Zanaflex to see if this will help with the symptoms.  Will also make referral to pain management to see about undergoing injections in his back.  He will go for scoliosis x-rays and flexion-extension x-rays.  I will have him follow-up with Dr. Edwards at the next available appointment to see how he is doing from this treatment plan and make further recommendation at that time.      I spent 60 minutes caring for Jaime on this date of service. This time includes time spent by me in the following activities: preparing for the visit, reviewing tests, performing a medically appropriate examination and/or evaluation, counseling and educating the patient/family/caregiver, documenting information in the medical record, independently interpreting results and communicating that information with the patient/family/caregiver, care coordination, ordering medications, ordering test(s), and obtaining a separately obtained history       Patient is a nonsmoker  The patient's Body mass index is 32.59 kg/m².. BMI is above normal parameters. Recommendations include: educational material and nutrition counseling  Advance Care Planning   ACP discussion was held with the patient during this visit. Patient does not have an advance directive, information provided.  STEADI Fall Risk Assessment was completed, and patient is at LOW risk for falls.Assessment completed on:11/19/2024       Diagnoses and all orders for this visit:    1. Chronic bilateral low back pain with bilateral sciatica (Primary)  -     Ambulatory Referral to Pain Management Clinic  -     XR Spine Lumbar Complete With Flex & Ext  -     XR Spine Scoliosis 2 or 3 Views; Future  -     gabapentin  (Neurontin) 100 MG capsule; Take 1 capsule by mouth 3 (Three) Times a Day.  Dispense: 90 capsule; Refill: 2    2. Degeneration of intervertebral disc of lumbar region with discogenic back pain and lower extremity pain  -     Ambulatory Referral to Pain Management Clinic  -     XR Spine Lumbar Complete With Flex & Ext  -     XR Spine Scoliosis 2 or 3 Views; Future  -     gabapentin (Neurontin) 100 MG capsule; Take 1 capsule by mouth 3 (Three) Times a Day.  Dispense: 90 capsule; Refill: 2    3. Lumbar stenosis with neurogenic claudication  -     Ambulatory Referral to Pain Management Clinic  -     XR Spine Lumbar Complete With Flex & Ext  -     XR Spine Scoliosis 2 or 3 Views; Future  -     gabapentin (Neurontin) 100 MG capsule; Take 1 capsule by mouth 3 (Three) Times a Day.  Dispense: 90 capsule; Refill: 2    4. Class 1 obesity due to excess calories without serious comorbidity with body mass index (BMI) of 32.0 to 32.9 in adult    5. Nonsmoker    Other orders  -     diclofenac (VOLTAREN) 75 MG EC tablet; Take 1 tablet by mouth 2 (Two) Times a Day.  Dispense: 60 tablet; Refill: 2  -     tiZANidine (ZANAFLEX) 4 MG tablet; Take 1 tablet by mouth 2 (Two) Times a Day As Needed for Muscle Spasms.  Dispense: 60 tablet; Refill: 2          I discussed the patients findings and my recommendations with patient    Jluis Parkinson, APRN  11/19/24  10:49 CST

## 2024-11-19 NOTE — PATIENT INSTRUCTIONS
"  BMI for Adults  What is BMI?  Body mass index (BMI) is a number that is calculated from a person's weight and height. BMI can help estimate how much of a person's weight is composed of fat. BMI does not measure body fat directly. Rather, it is an alternative to procedures that directly measure body fat, which can be difficult and expensive.  BMI can help identify people who may be at higher risk for certain medical problems.  What are BMI measurements used for?  BMI is used as a screening tool to identify possible weight problems. It helps determine whether a person is obese, overweight, a healthy weight, or underweight.  BMI is useful for:  Identifying a weight problem that may be related to a medical condition or may increase the risk for medical problems.  Promoting changes, such as changes in diet and exercise, to help reach a healthy weight. BMI screening can be repeated to see if these changes are working.  How is BMI calculated?  BMI involves measuring your weight in relation to your height. Both height and weight are measured, and the BMI is calculated from those numbers. This can be done either in English (U[place] or metric measurements. Note that charts and online BMI calculators are available to help you find your BMI quickly and easily without having to do these calculations yourself.  To calculate your BMI in English (U.S[place] measurements:    Measure your weight in pounds (lb).  Multiply the number of pounds by 703.  For example, for a person who weighs 180 lb, multiply that number by 703, which equals 126,540.  Measure your height in inches. Then multiply that number by itself to get a measurement called \"inches squared.\"  For example, for a person who is 70 inches tall, the \"inches squared\" measurement is 70 inches x 70 inches, which equals 4,900 inches squared.  Divide the total from step 2 (number of lb x 703) by the total from step 3 (inches squared): 126,540 ÷ 4,900 = 25.8. This is your BMI.    To " "calculate your BMI in metric measurements:  Measure your weight in kilograms (kg).  Measure your height in meters (m). Then multiply that number by itself to get a measurement called \"meters squared.\"  For example, for a person who is 1.75 m tall, the \"meters squared\" measurement is 1.75 m x 1.75 m, which is equal to 3.1 meters squared.  Divide the number of kilograms (your weight) by the meters squared number. In this example: 70 ÷ 3.1 = 22.6. This is your BMI.  What do the results mean?  BMI charts are used to identify whether you are underweight, normal weight, overweight, or obese. The following guidelines will be used:  Underweight: BMI less than 18.5.  Normal weight: BMI between 18.5 and 24.9.  Overweight: BMI between 25 and 29.9.  Obese: BMI of 30 or above.  Keep these notes in mind:  Weight includes both fat and muscle, so someone with a muscular build, such as an athlete, may have a BMI that is higher than 24.9. In cases like these, BMI is not an accurate measure of body fat.  To determine if excess body fat is the cause of a BMI of 25 or higher, further assessments may need to be done by a health care provider.  BMI is usually interpreted in the same way for men and women.  Where to find more information  For more information about BMI, including tools to quickly calculate your BMI, go to these websites:  Centers for Disease Control and Prevention: www.cdc.gov  American Heart Association: www.heart.org  National Heart, Lung, and Blood [place]: www.nhlbi.nih.gov  Summary  Body mass index (BMI) is a number that is calculated from a person's weight and height.  BMI may help estimate how much of a person's weight is composed of fat. BMI can help identify those who may be at higher risk for certain medical problems.  BMI can be measured using English measurements or metric measurements.  BMI charts are used to identify whether you are underweight, normal weight, overweight, or obese.  This information is not " "intended to replace advice given to you by your health care provider. Make sure you discuss any questions you have with your health care provider.  Document Revised: 09/09/2020 Document Reviewed: 07/17/2020  [employer] Patient Education © 2021 [employer] https://www.nhlbi.nih.gov/files/docs/public/heart/dash_brief.pdf\">   DASH Eating Plan  DASH stands for Dietary Approaches to Stop Hypertension. The DASH eating plan is a healthy eating plan that has been shown to:  Reduce high blood pressure (hypertension).  Reduce your risk for type 2 diabetes, heart disease, and stroke.  Help with weight loss.  What are tips for following this plan?  Reading food labels  Check food labels for the amount of salt (sodium) per serving. Choose foods with less than 5 percent of the Daily Value of sodium. Generally, foods with less than 300 milligrams (mg) of sodium per serving fit into this eating plan.  To find whole grains, look for the word \"whole\" as the first word in the ingredient list.  Shopping  Buy products labeled as \"low-sodium\" or \"no salt added.\"  Buy fresh foods. Avoid canned foods and pre-made or frozen meals.  Cooking  Avoid adding salt when cooking. Use salt-free seasonings or herbs instead of table salt or sea salt. Check with your health care provider or pharmacist before using salt substitutes.  Do not overton foods. Cook foods using healthy methods such as baking, boiling, grilling, roasting, and broiling instead.  Cook with heart-healthy oils, such as olive, canola, avocado, soybean, or sunflower oil.  Meal planning    Eat a balanced diet that includes:  4 or more servings of fruits and 4 or more servings of vegetables each day. Try to fill one-half of your plate with fruits and vegetables.  6-8 servings of whole grains each day.  Less than 6 oz (170 g) of lean meat, poultry, or fish each day. A 3-oz (85-g) serving of meat is about the same size as a deck of cards. One egg equals 1 oz (28 g).  2-3 servings of low-fat " dairy each day. One serving is 1 cup (237 mL).  1 serving of nuts, seeds, or beans 5 times each week.  2-3 servings of heart-healthy fats. Healthy fats called omega-3 fatty acids are found in foods such as walnuts, flaxseeds, fortified milks, and eggs. These fats are also found in cold-water fish, such as sardines, salmon, and mackerel.  Limit how much you eat of:  Canned or prepackaged foods.  Food that is high in trans fat, such as some fried foods.  Food that is high in saturated fat, such as fatty meat.  Desserts and other sweets, sugary drinks, and other foods with added sugar.  Full-fat dairy products.  Do not salt foods before eating.  Do not eat more than 4 egg yolks a week.  Try to eat at least 2 vegetarian meals a week.  Eat more home-cooked food and less restaurant, buffet, and fast food.    Lifestyle  When eating at a restaurant, ask that your food be prepared with less salt or no salt, if possible.  If you drink alcohol:  Limit how much you use to:  0-1 drink a day for women who are not pregnant.  0-2 drinks a day for men.  Be aware of how much alcohol is in your drink. In the [place] one drink equals one 12 oz bottle of beer (355 mL), one 5 oz glass of wine (148 mL), or one 1½ oz glass of hard liquor (44 mL).  General information  Avoid eating more than 2,300 mg of salt a day. If you have hypertension, you may need to reduce your sodium intake to 1,500 mg a day.  Work with your health care provider to maintain a healthy body weight or to lose weight. Ask what an ideal weight is for you.  Get at least 30 minutes of exercise that causes your heart to beat faster (aerobic exercise) most days of the week. Activities may include walking, swimming, or biking.  Work with your health care provider or dietitian to adjust your eating plan to your individual calorie needs.  What foods should I eat?  Fruits  All fresh, dried, or frozen fruit. Canned fruit in natural juice (without added sugar).  Vegetables  Fresh or  frozen vegetables (raw, steamed, roasted, or grilled). Low-sodium or reduced-sodium tomato and vegetable juice. Low-sodium or reduced-sodium tomato sauce and tomato paste. Low-sodium or reduced-sodium canned vegetables.  Grains  Whole-grain or whole-wheat bread. Whole-grain or whole-wheat pasta. Brown rice. Oatmeal. Quinoa. Bulgur. Whole-grain and low-sodium cereals. Tricia bread. Low-fat, low-sodium crackers. Whole-wheat flour tortillas.  Meats and other proteins  Skinless chicken or turkey. Ground chicken or turkey. Pork with fat trimmed off. Fish and seafood. Egg whites. Dried beans, peas, or lentils. Unsalted nuts, nut butters, and seeds. Unsalted canned beans. Lean cuts of beef with fat trimmed off. Low-sodium, lean precooked or cured meat, such as sausages or meat loaves.  Dairy  Low-fat (1%) or fat-free (skim) milk. Reduced-fat, low-fat, or fat-free cheeses. Nonfat, low-sodium ricotta or cottage cheese. Low-fat or nonfat yogurt. Low-fat, low-sodium cheese.  Fats and oils  Soft margarine without trans fats. Vegetable oil. Reduced-fat, low-fat, or light mayonnaise and salad dressings (reduced-sodium). Canola, safflower, olive, avocado, soybean, and sunflower oils. Avocado.  Seasonings and condiments  Herbs. Spices. Seasoning mixes without salt.  Other foods  Unsalted popcorn and pretzels. Fat-free sweets.  The items listed above may not be a complete list of foods and beverages you can eat. Contact a dietitian for more information.  What foods should I avoid?  Fruits  Canned fruit in a light or heavy syrup. Fried fruit. Fruit in cream or butter sauce.  Vegetables  Creamed or fried vegetables. Vegetables in a cheese sauce. Regular canned vegetables (not low-sodium or reduced-sodium). Regular canned tomato sauce and paste (not low-sodium or reduced-sodium). Regular tomato and vegetable juice (not low-sodium or reduced-sodium). Pickles. Olives.  Grains  Baked goods made with fat, such as croissants, muffins, or some  breads. Dry pasta or rice meal packs.  Meats and other proteins  Fatty cuts of meat. Ribs. Fried meat. [place]. Bologna, salami, and other precooked or cured meats, such as sausages or meat loaves. Fat from the back of a pig (fatback). Bratwurst. Salted nuts and seeds. Canned beans with added salt. Canned or smoked fish. Whole eggs or egg yolks. Chicken or turkey with skin.  Dairy  Whole or 2% milk, cream, and half-and-half. Whole or full-fat cream cheese. Whole-fat or sweetened yogurt. Full-fat cheese. Nondairy creamers. Whipped toppings. Processed cheese and cheese spreads.  Fats and oils  Butter. Stick margarine. Lard. Shortening. Ghee. Irving fat. Tropical oils, such as coconut, palm kernel, or palm oil.  Seasonings and condiments  Onion salt, garlic salt, seasoned salt, table salt, and sea salt. Worcestershire sauce. Tartar sauce. Barbecue sauce. Teriyaki sauce. Soy sauce, including reduced-sodium. Steak sauce. Canned and packaged gravies. Fish sauce. Oyster sauce. Cocktail sauce. Store-bought horseradish. Ketchup. Mustard. Meat flavorings and tenderizers. Bouillon cubes. Hot sauces. Pre-made or packaged marinades. Pre-made or packaged taco seasonings. Relishes. Regular salad dressings.  Other foods  Salted popcorn and pretzels.  The items listed above may not be a complete list of foods and beverages you should avoid. Contact a dietitian for more information.  Where to find more information  National Heart, Lung, and Blood [place]: www.nhlbi.nih.gov  American Heart Association: www.heart.org  Academy of Nutrition and Dietetics: www.eatright.org  National Kidney Foundation: www.kidney.org  Summary  The DASH eating plan is a healthy eating plan that has been shown to reduce high blood pressure (hypertension). It may also reduce your risk for type 2 diabetes, heart disease, and stroke.  When on the DASH eating plan, aim to eat more fresh fruits and vegetables, whole grains, lean proteins, low-fat dairy, and  heart-healthy fats.  With the DASH eating plan, you should limit salt (sodium) intake to 2,300 mg a day. If you have hypertension, you may need to reduce your sodium intake to 1,500 mg a day.  Work with your health care provider or dietitian to adjust your eating plan to your individual calorie needs.  This information is not intended to replace advice given to you by your health care provider. Make sure you discuss any questions you have with your health care provider.  Document Revised: 11/20/2020 Document Reviewed: 11/20/2020  [employer] Patient Education © 2021 [employer] High-Protein and High-Calorie Diet  Eating high-protein and high-calorie foods can help you to gain weight, heal after an injury, and recover after an illness or surgery. The specific amount of daily protein and calories you need depends on:  Your body weight.  The reason this diet is recommended for you.  What is my plan?  Generally, a high-protein, high-calorie diet involves:  Eating 250-500 extra calories each day.  Making sure that you get enough of your daily calories from protein. Ask your health care provider how many of your calories should come from protein.  Talk with a health care provider, such as a diet and nutrition specialist (dietitian), about how much protein and how many calories you need each day. Follow the diet as directed by your health care provider.  What are tips for following this plan?    Preparing meals  Add whole milk, half-and-half, or heavy cream to cereal, pudding, soup, or hot cocoa.  Add whole milk to instant breakfast drinks.  Add peanut butter to oatmeal or smoothies.  Add powdered milk to baked goods, smoothies, or milkshakes.  Add powdered milk, cream, or butter to mashed potatoes.  Add cheese to cooked vegetables.  Make whole-milk yogurt parfaits. Top them with granola, fruit, or nuts.  Add cottage cheese to your fruit.  Add avocado, cheese, or both to sandwiches or salads.  Add meat, poultry, or seafood  to rice, pasta, casseroles, salads, and soups.  Use mayonnaise when making egg salad, chicken salad, or tuna salad.  Use peanut butter as a dip for vegetables or as a topping for pretzels, celery, or crackers.  Add beans to casseroles, dips, and spreads.  Add pureed beans to sauces and soups.  Replace calorie-free drinks with calorie-containing drinks, such as milk and fruit juice.  Replace water with milk or heavy cream when making foods such as oatmeal, pudding, or cocoa.  General instructions  Ask your health care provider if you should take a nutritional supplement.  Try to eat six small meals each day instead of three large meals.  Eat a balanced diet. In each meal, include one food that is high in protein.  Keep nutritious snacks available, such as nuts, trail mixes, dried fruit, and yogurt.  If you have kidney disease or diabetes, talk with your health care provider about how much protein is safe for you. Too much protein may put extra stress on your kidneys.  Drink your calories. Choose high-calorie drinks and have them after your meals.  What high-protein foods should I eat?    Vegetables  Soybeans. Peas.  Grains  Quinoa. Bulgur wheat.  Meats and other proteins  Beef, pork, and poultry. Fish and seafood. Eggs. Tofu. Textured vegetable protein (TVP). Peanut butter. Nuts and seeds. Dried beans. Protein powders.  Dairy  Whole milk. Whole-milk yogurt. Powdered milk. Cheese. Cottage Cheese. Eggnog.  Beverages  High-protein supplement drinks. Soy milk.  Other foods  Protein bars.  The items listed above may not be a complete list of high-protein foods and beverages. Contact a dietitian for more options.  What high-calorie foods should I eat?  Fruits  Dried fruit. Fruit leather. Canned fruit in syrup. Fruit juice. Avocado.  Vegetables  Vegetables cooked in oil or butter. Fried potatoes.  Grains  Pasta. Quick breads. Muffins. Pancakes. Ready-to-eat cereal.  Meats and other proteins  Peanut butter. Nuts and  seeds.  Dairy  Heavy cream. Whipped cream. Cream cheese. Sour cream. Ice cream. Custard. Pudding.  Beverages  Meal-replacement beverages. Nutrition shakes. Fruit juice. Sugar-sweetened soft drinks.  Seasonings and condiments  Salad dressing. Mayonnaise. Robert sauce. Fruit preserves or jelly. Honey. Syrup.  Sweets and desserts  Cake. Cookies. Pie. Pastries. Candy bars. Chocolate.  Fats and oils  Butter or margarine. Oil. Gravy.  Other foods  Meal-replacement bars.  The items listed above may not be a complete list of high-calorie foods and beverages. Contact a dietitian for more options.  Summary  A high-protein, high-calorie diet can help you gain weight or heal faster after an injury, illness, or surgery.  To increase your protein and calories, add ingredients such as whole milk, peanut butter, cheese, beans, meat, or seafood to meal items.  To get enough extra calories each day, include high-calorie foods and beverages at each meal.  Adding a high-calorie drink or shake can be an easy way to help you get enough calories each day. Talk with your healthcare provider or dietitian about the best options for you.  This information is not intended to replace advice given to you by your health care provider. Make sure you discuss any questions you have with your health care provider.  Document Revised: 11/30/2018 Document Reviewed: 10/30/2018  [employer] Patient Education © 2021 [employer] Advance Care Planning and Advance Directives     You make decisions on a daily basis - decisions about where you want to live, your career, your home, your life. Perhaps one of the most important decisions you face is your choice for future medical care. Take time to talk with your family and your healthcare team and start planning today.  Advance Care Planning is a process that can help you:  Understand possible future healthcare decisions in light of your own experiences  Reflect on those decision in light of your goals and  values  Discuss your decisions with those closest to you and the healthcare professionals that care for you  Make a plan by creating a document that reflects your wishes    Surrogate Decision Maker  In the event of a medical emergency, which has left you unable to communicate or to make your own decisions, you would need someone to make decisions for you.  It is important to discuss your preferences for medical treatment with this person while you are in good health.     Qualities of a surrogate decision maker:  Willing to take on this role and responsibility  Knows what you want for future medical care  Willing to follow your wishes even if they don't agree with them  Able to make difficult medical decisions under stressful circumstances    Advance Directives  These are legal documents you can create that will guide your healthcare team and decision maker(s) when needed. These documents can be stored in the electronic medical record.    Living Will - a legal document to guide your care if you have a terminal condition or a serious illness and are unable to communicate. States vary by statute in document names/types, but most forms may include one or more of the following:        -  Directions regarding life-prolonging treatments        -  Directions regarding artificially provided nutrition/hydration        -  Choosing a healthcare decision maker        -  Direction regarding organ/tissue donation    Durable Power of  for Healthcare - this document names an -in-fact to make medical decisions for you, but it may also allow this person to make personal and financial decisions for you. Please seek the advice of an  if you need this type of document.    **Advance Directives are not required and no one may discriminate against you if you do not sign one.    Medical Orders  Many states allow specific forms/orders signed by your physician to record your wishes for medical treatment in your current  [facility]. This form, signed in personal communication with your physician, addresses resuscitation and other medical interventions that you may or may not want.      For more information or to schedule a time with a [facility] Advance Care Planning Facilitator contact: [facility] or call [phone] and someone will contact you directly.

## 2024-11-22 ENCOUNTER — TELEPHONE (OUTPATIENT)
Age: 74
End: 2024-11-22

## 2024-11-22 NOTE — TELEPHONE ENCOUNTER
Called and spoke with patient regarding picking a date for surgery with Dr. Tran. He stated he was ok with Dr. Tran's next available date. I let him know he is scheduled out until 12/12 at the surgery center. He agreed with this date and I let him know the surgery center would call him the day before to let him know his arrival time. All questions were answered at this time.

## 2024-11-22 NOTE — TELEPHONE ENCOUNTER
Patient called stating that he has seen Dr Tran about a cyst on his right middle finger. He wanted to schedule a surgery to have it removed.

## 2024-11-25 DIAGNOSIS — G89.29 CHRONIC BILATERAL LOW BACK PAIN WITH BILATERAL SCIATICA: Primary | ICD-10-CM

## 2024-11-25 DIAGNOSIS — M54.41 CHRONIC BILATERAL LOW BACK PAIN WITH BILATERAL SCIATICA: Primary | ICD-10-CM

## 2024-11-25 DIAGNOSIS — M54.42 CHRONIC BILATERAL LOW BACK PAIN WITH BILATERAL SCIATICA: Primary | ICD-10-CM

## 2024-11-25 RX ORDER — GABAPENTIN 300 MG/1
300 CAPSULE ORAL 3 TIMES DAILY
Qty: 90 CAPSULE | Refills: 2 | Status: SHIPPED | OUTPATIENT
Start: 2024-11-25

## 2024-12-12 DIAGNOSIS — R22.31 FINGER MASS, RIGHT: Primary | ICD-10-CM

## 2024-12-13 RX ORDER — HYDROCODONE BITARTRATE AND ACETAMINOPHEN 5; 325 MG/1; MG/1
1 TABLET ORAL EVERY 6 HOURS PRN
Qty: 15 TABLET | Refills: 0 | Status: SHIPPED | OUTPATIENT
Start: 2024-12-13 | End: 2024-12-17

## 2024-12-30 ENCOUNTER — OFFICE VISIT (OUTPATIENT)
Age: 74
End: 2024-12-30

## 2024-12-30 VITALS — HEIGHT: 73 IN | WEIGHT: 247.2 LBS | BODY MASS INDEX: 32.76 KG/M2

## 2024-12-30 DIAGNOSIS — R22.31 FINGER MASS, RIGHT: Primary | ICD-10-CM

## 2024-12-30 PROCEDURE — 99024 POSTOP FOLLOW-UP VISIT: CPT | Performed by: NURSE PRACTITIONER

## 2024-12-30 RX ORDER — GABAPENTIN 300 MG/1
300 CAPSULE ORAL 3 TIMES DAILY
COMMUNITY

## 2024-12-30 RX ORDER — GABAPENTIN 100 MG/1
100 CAPSULE ORAL 3 TIMES DAILY
COMMUNITY
Start: 2024-11-19

## 2024-12-30 NOTE — PROGRESS NOTES
[facility]  [facility]  [address]  [facility] [id number] [phone]     Patient: Angel Aguilar   YOB: 1950   Date: 12/30/2024     History of Present Illness  Angel is a right hand dominant 74 y.o. male who returns today for follow-up of right middle finger marginal mass excision on 12/16/2024.  Patient reports no significant interval medical issues.  Pain is controlled.  Patient reports some clear to cloudy drainage from the incision.  Patient has concerns for possible infection.    Past Medical History:   Diagnosis Date    High cholesterol     Hypertension     Macular atrophy of skin     Melanoma (HCC)     Obesity     Psoriasis     Stroke (cerebrum) (HCC)     Thyroid disease       Past Surgical History:   Procedure Laterality Date    FINGER MASS EXCISION      HYDROCELE EXCISION        Social History     Socioeconomic History    Marital status:    Tobacco Use    Smoking status: Former     Types: Cigarettes    Smokeless tobacco: Never     Social Determinants of Health     Food Insecurity: No Food Insecurity (2/7/2023)    Received from [facility]    Hunger Vital Sign     Worried About Running Out of Food in the Last Year: Never true     Ran Out of Food in the Last Year: Never true    Received from [facility]    Family and Community Support   Intimate Partner Violence: Unknown (10/16/2024)    Received from [facility]    Abuse Screen     Feels Unsafe at Home or Work/School: no     Feels Threatened by Someone: no     Does Anyone Try to Keep You From Having Contact with Others or Doing Things Outside Your Home?: no    Received from [facility]    Housing Stability      Social History     Occupational History    Not on file   Tobacco Use    Smoking status: Former     Types: Cigarettes    Smokeless tobacco: Never   Substance and Sexual Activity    Alcohol use: Not

## 2025-01-15 ENCOUNTER — OFFICE VISIT (OUTPATIENT)
Age: 75
End: 2025-01-15

## 2025-01-15 VITALS — HEIGHT: 73 IN | BODY MASS INDEX: 32.77 KG/M2 | WEIGHT: 247.3 LBS

## 2025-01-15 DIAGNOSIS — R22.31 FINGER MASS, RIGHT: Primary | ICD-10-CM

## 2025-01-15 PROCEDURE — 99024 POSTOP FOLLOW-UP VISIT: CPT | Performed by: NURSE PRACTITIONER

## 2025-01-15 NOTE — PROGRESS NOTES
(6' 1\")   Wt 112.2 kg (247 lb 4.8 oz)   BMI 32.63 kg/m²      Physical Exam   Physical Examination:  General: The patient is a Well Nourished 74 y.o. male who is calm, no acute distress.  Psychological: Appropriate mood and affect  HEENT: Normocephalic, Atraumatic. No gross visual or auditory acuity deficits.   Respiratory: Rate and effort within normal limits.   Vascular: Capillary refill <3 seconds to extremities  Musculoskeletal:   Right hand: Incision to right middle finger edges well-approximated.  Redness around the incision.  No drainage noted.  No signs of infection noted.  No fever noted to this area.  Patient has full range of motion to all joints in the hand and wrist.  Hand is warm and well-perfused.     Imaging  None      Plan    Angel is a 74 y.o. male who returns for follow-up after  right middle finger marginal mass excision on 12/16/2024, doing well. May gradually proceed with activities as tolerated.  Discussed follow-up today and they feel comfortable returning on an as-needed basis.        This dictation was generated by voice recognition computer software. Although all attempts are made to edit the dictation for accuracy, there may be errors in the transcription that are not intended.    Electronically signed by GRIS oFreman CNP on 1/15/2025 at 9:37 AM

## 2025-01-20 RX ORDER — DICLOFENAC SODIUM 75 MG/1
75 TABLET, DELAYED RELEASE ORAL 2 TIMES DAILY
Qty: 60 TABLET | Refills: 2 | Status: SHIPPED | OUTPATIENT
Start: 2025-01-20

## 2025-02-13 ENCOUNTER — OFFICE VISIT (OUTPATIENT)
Dept: GASTROENTEROLOGY | Facility: CLINIC | Age: 75
End: 2025-02-13
Payer: MEDICARE

## 2025-02-13 VITALS
WEIGHT: 243.6 LBS | OXYGEN SATURATION: 97 % | BODY MASS INDEX: 33 KG/M2 | TEMPERATURE: 97.3 F | HEART RATE: 70 BPM | DIASTOLIC BLOOD PRESSURE: 80 MMHG | SYSTOLIC BLOOD PRESSURE: 130 MMHG | HEIGHT: 72 IN

## 2025-02-13 DIAGNOSIS — Z86.0101 HX OF ADENOMATOUS COLONIC POLYPS: Primary | ICD-10-CM

## 2025-02-13 DIAGNOSIS — I10 HTN (HYPERTENSION), BENIGN: ICD-10-CM

## 2025-02-13 DIAGNOSIS — Z78.9 NONSMOKER: ICD-10-CM

## 2025-02-13 RX ORDER — SODIUM, POTASSIUM,MAG SULFATES 17.5-3.13G
SOLUTION, RECONSTITUTED, ORAL ORAL
Qty: 177 ML | Refills: 0 | Status: SHIPPED | OUTPATIENT
Start: 2025-02-13

## 2025-02-13 RX ORDER — TIMOLOL MALEATE 5 MG/ML
1 SOLUTION/ DROPS OPHTHALMIC 2 TIMES DAILY
COMMUNITY

## 2025-02-13 NOTE — PROGRESS NOTES
Jaime Mckeon  1950      2/13/2025  Chief Complaint   Patient presents with    GI Problem     Colon recall-hx of polyps     Subjective   HPI  Jaime Mckeon is a 74 y.o. male who presents as a referral for preventative maintenance. He has no complaints of nausea or vomiting. No change in bowels. No wt loss. No BRBPR. No melena. There is NO family hx for colon cancer. No abdominal pain.    Past Medical History:   Diagnosis Date    Allergic rhinitis     Arthritis     Back pain 10/2024    BPH (benign prostatic hyperplasia)     Claustrophobia     History of cardioembolic cerebrovascular accident (CVA)     History of transfusion 1974    Has had a blood transfusion, in 1974    Hyperlipidemia     Hypertension     Hypothyroidism     Lumbosacral disc disease     Macular degeneration, wet     wet in right  dry in left    Obesity      Past Surgical History:   Procedure Laterality Date    CATARACT EXTRACTION W/ INTRAOCULAR LENS IMPLANT      COLONOSCOPY  06/27/2006    Hemorrhoids    COLONOSCOPY N/A 02/22/2022    Dr. parekh-One 2 mm adenomatous polyp in the cecum, One 3 mm fragments of sessile serrated adenoma polyp at the hepatic flexure at 80 cm proximal to the anus,  One 6 mm adenomatous polyp at 80 cm proximal to the anus,  One 5 mm hyperplastic  polyp at 40 cm proximal to the anus - Diverticulosis in the sigmoid colon.    ENDOSCOPY  07/11/2006    Grade A esophagitis, small HH; Pt states he does not remember this    VASECTOMY Bilateral      Outpatient Medications Marked as Taking for the 2/13/25 encounter (Office Visit) with Sophie Tavarez APRN   Medication Sig Dispense Refill    amLODIPine (NORVASC) 10 MG tablet Take 1 tablet by mouth Daily.      aspirin 325 MG tablet Take 1 tablet by mouth Daily. 30 tablet 0    atorvastatin (LIPITOR) 40 MG tablet Take 1 tablet by mouth Daily.      diclofenac (VOLTAREN) 75 MG EC tablet TAKE 1 TABLET BY MOUTH 2 (TWO) TIMES A DAY. 60 tablet 2    gabapentin (Neurontin) 300 MG capsule  Take 1 capsule by mouth 3 (Three) Times a Day. 90 capsule 2    latanoprost (XALATAN) 0.005 % ophthalmic solution Administer 1 drop to both eyes Every Night.      levothyroxine (SYNTHROID, LEVOTHROID) 25 MCG tablet Take 1 tablet by mouth Every Morning.      losartan-hydrochlorothiazide (HYZAAR) 100-12.5 MG per tablet Take 1 tablet by mouth Every Morning.      Multiple Vitamins-Minerals (PRESERVISION AREDS 2 PO) Take 1 tablet by mouth 2 (Two) Times a Day.      timolol (TIMOPTIC) 0.5 % ophthalmic solution 1 drop 2 (Two) Times a Day.      tiZANidine (ZANAFLEX) 4 MG tablet Take 1 tablet by mouth 2 (Two) Times a Day As Needed for Muscle Spasms. 60 tablet 2     No Known Allergies  Social History     Socioeconomic History    Marital status:    Tobacco Use    Smoking status: Former     Types: Pipe     Passive exposure: Past    Smokeless tobacco: Never    Tobacco comments:     stopped 35+ years   Vaping Use    Vaping status: Never Used   Substance and Sexual Activity    Alcohol use: Yes     Comment: Daily wine    Drug use: Never    Sexual activity: Not Currently     Family History   Problem Relation Age of Onset    Arthritis Mother     Diabetes Mother     Cancer Brother     COPD Brother     Heart disease Brother     Vision loss Sister     Colon cancer Neg Hx     Colon polyps Neg Hx      Health Maintenance   Topic Date Due    Pneumococcal Vaccine 50+ (1 of 1 - PCV) Never done    AAA SCREEN ONCE  Never done    HEPATITIS C SCREENING  Never done    ANNUAL WELLNESS VISIT  Never done    LIPID PANEL  02/07/2024    INFLUENZA VACCINE  Never done    COVID-19 Vaccine (5 - 2024-25 season) 09/01/2024    COLORECTAL CANCER SCREENING  02/22/2025    BMI FOLLOWUP  11/19/2025    TDAP/TD VACCINES (2 - Td or Tdap) 07/19/2031    ZOSTER VACCINE  Completed       REVIEW OF SYSTEMS  General: well appearing, no fever chills or sweats, no unexplained wt loss  HEENT: no acute visual or hearing disturbances  Cardiovascular: No chest pain or  "palpitations  Pulmonary: No shortness of breath, coughing, wheezing or hemoptysis  : No burning, urgency, hematuria, or dysuria  Musculoskeletal: No joint pain or stiffness  Peripheral: no edema  Skin: No lesions or rashes  Neuro: No dizziness, headaches, stroke, syncope  Endocrine: No hot or cold intolerances  Hematological: No blood dyscrasias    Objective   Vitals:    02/13/25 0907   BP: 130/80   BP Location: Left arm   Pulse: 70   Temp: 97.3 °F (36.3 °C)   TempSrc: Temporal   SpO2: 97%   Weight: 110 kg (243 lb 9.6 oz)   Height: 182 cm (71.65\")     Body mass index is 33.36 kg/m².         PHYSICAL EXAM  General: age appropriate well nourished well appearing, no acute distress  Head: normocephalic and atraumatic  Global assessment-supple  Neck-No JVD noted, no lymphadenopathy  Pulmonary-clear to auscultation bilaterally, normal respiratory effort  Cardiovascular-normal rate and rhythm, normal heart sounds, S1 and S2 noted  Abdomen-soft, non tender, non distended, normal bowel sounds all 4 quadrants, no hepatosplenomegaly noted  Extremities-No clubbing cyanosis or edema  Neuro-Non focal, converses appropriately, awake, alert, oriented    Assessment & Plan     Diagnoses and all orders for this visit:    1. Hx of adenomatous colonic polyps (Primary)  Comments:  sessile serrated adenoma noted 2022  Orders:  -     Case Request; Standing  -     Case Request    2. HTN (hypertension), benign  Comments:  cont BP medication the day of procedure    3. Nonsmoker    Other orders  -     Implement Anesthesia Orders Day of Procedure; Standing  -     Follow Anesthesia Guidelines / Protocol; Future  -     Verify bowel prep was successful; Standing  -     sodium-potassium-magnesium sulfates (Suprep Bowel Prep Kit) 17.5-3.13-1.6 GM/177ML solution oral solution; Take as directed by office instructions provided  Dispense: 177 mL; Refill: 0        COLONOSCOPY WITH ANESTHESIA (N/A)  Body mass index is 33.36 kg/m².    Patient " instructions on prep prior to procedure provided to the patient.    All risks, benefits, alternatives, and indications of colonoscopy procedure have been discussed with the patient. Risks to include perforation of the colon requiring possible surgery or colostomy, risk of bleeding from biopsies or removal of colon tissue, possibility of missing a colon polyp or cancer, or adverse drug reaction.  Benefits to include the diagnosis and management of disease of the colon and rectum. Alternatives to include barium enema, radiographic evaluation, lab testing or no intervention. Pt verbalizes understanding and agrees.     Sophie Tavarez, APRN  2025  09:32 CST      IF YOU SMOKE OR USE TOBACCO PLEASE READ THE FOLLOWIN minutes reading provided    Why is smoking bad for me?  Smoking increases the risk of heart disease, lung disease, vascular disease, stroke, and cancer.     If you smoke, STOP!    If you would like more information on quitting smoking, please visit the [employer] website: [email or web address]   This link will provide additional resources including the QUIT line and the Beat the Pack support groups.     For more information:    Quit Now [place]  -QUIT-NOW  [email or web address]

## 2025-02-13 NOTE — H&P (VIEW-ONLY)
Jaime Mckeon  1950      2/13/2025  Chief Complaint   Patient presents with    GI Problem     Colon recall-hx of polyps     Subjective   HPI  Jaime Mckeon is a 74 y.o. male who presents as a referral for preventative maintenance. He has no complaints of nausea or vomiting. No change in bowels. No wt loss. No BRBPR. No melena. There is NO family hx for colon cancer. No abdominal pain.    Past Medical History:   Diagnosis Date    Allergic rhinitis     Arthritis     Back pain 10/2024    BPH (benign prostatic hyperplasia)     Claustrophobia     History of cardioembolic cerebrovascular accident (CVA)     History of transfusion 1974    Has had a blood transfusion, in 1974    Hyperlipidemia     Hypertension     Hypothyroidism     Lumbosacral disc disease     Macular degeneration, wet     wet in right  dry in left    Obesity      Past Surgical History:   Procedure Laterality Date    CATARACT EXTRACTION W/ INTRAOCULAR LENS IMPLANT      COLONOSCOPY  06/27/2006    Hemorrhoids    COLONOSCOPY N/A 02/22/2022    Dr. parekh-One 2 mm adenomatous polyp in the cecum, One 3 mm fragments of sessile serrated adenoma polyp at the hepatic flexure at 80 cm proximal to the anus,  One 6 mm adenomatous polyp at 80 cm proximal to the anus,  One 5 mm hyperplastic  polyp at 40 cm proximal to the anus - Diverticulosis in the sigmoid colon.    ENDOSCOPY  07/11/2006    Grade A esophagitis, small HH; Pt states he does not remember this    VASECTOMY Bilateral      Outpatient Medications Marked as Taking for the 2/13/25 encounter (Office Visit) with Sophie Tavarez APRN   Medication Sig Dispense Refill    amLODIPine (NORVASC) 10 MG tablet Take 1 tablet by mouth Daily.      aspirin 325 MG tablet Take 1 tablet by mouth Daily. 30 tablet 0    atorvastatin (LIPITOR) 40 MG tablet Take 1 tablet by mouth Daily.      diclofenac (VOLTAREN) 75 MG EC tablet TAKE 1 TABLET BY MOUTH 2 (TWO) TIMES A DAY. 60 tablet 2    gabapentin (Neurontin) 300 MG capsule  Take 1 capsule by mouth 3 (Three) Times a Day. 90 capsule 2    latanoprost (XALATAN) 0.005 % ophthalmic solution Administer 1 drop to both eyes Every Night.      levothyroxine (SYNTHROID, LEVOTHROID) 25 MCG tablet Take 1 tablet by mouth Every Morning.      losartan-hydrochlorothiazide (HYZAAR) 100-12.5 MG per tablet Take 1 tablet by mouth Every Morning.      Multiple Vitamins-Minerals (PRESERVISION AREDS 2 PO) Take 1 tablet by mouth 2 (Two) Times a Day.      timolol (TIMOPTIC) 0.5 % ophthalmic solution 1 drop 2 (Two) Times a Day.      tiZANidine (ZANAFLEX) 4 MG tablet Take 1 tablet by mouth 2 (Two) Times a Day As Needed for Muscle Spasms. 60 tablet 2     No Known Allergies  Social History     Socioeconomic History    Marital status:    Tobacco Use    Smoking status: Former     Types: Pipe     Passive exposure: Past    Smokeless tobacco: Never    Tobacco comments:     stopped 35+ years   Vaping Use    Vaping status: Never Used   Substance and Sexual Activity    Alcohol use: Yes     Comment: Daily wine    Drug use: Never    Sexual activity: Not Currently     Family History   Problem Relation Age of Onset    Arthritis Mother     Diabetes Mother     Cancer Brother     COPD Brother     Heart disease Brother     Vision loss Sister     Colon cancer Neg Hx     Colon polyps Neg Hx      Health Maintenance   Topic Date Due    Pneumococcal Vaccine 50+ (1 of 1 - PCV) Never done    AAA SCREEN ONCE  Never done    HEPATITIS C SCREENING  Never done    ANNUAL WELLNESS VISIT  Never done    LIPID PANEL  02/07/2024    INFLUENZA VACCINE  Never done    COVID-19 Vaccine (5 - 2024-25 season) 09/01/2024    COLORECTAL CANCER SCREENING  02/22/2025    BMI FOLLOWUP  11/19/2025    TDAP/TD VACCINES (2 - Td or Tdap) 07/19/2031    ZOSTER VACCINE  Completed       REVIEW OF SYSTEMS  General: well appearing, no fever chills or sweats, no unexplained wt loss  HEENT: no acute visual or hearing disturbances  Cardiovascular: No chest pain or  "palpitations  Pulmonary: No shortness of breath, coughing, wheezing or hemoptysis  : No burning, urgency, hematuria, or dysuria  Musculoskeletal: No joint pain or stiffness  Peripheral: no edema  Skin: No lesions or rashes  Neuro: No dizziness, headaches, stroke, syncope  Endocrine: No hot or cold intolerances  Hematological: No blood dyscrasias    Objective   Vitals:    02/13/25 0907   BP: 130/80   BP Location: Left arm   Pulse: 70   Temp: 97.3 °F (36.3 °C)   TempSrc: Temporal   SpO2: 97%   Weight: 110 kg (243 lb 9.6 oz)   Height: 182 cm (71.65\")     Body mass index is 33.36 kg/m².         PHYSICAL EXAM  General: age appropriate well nourished well appearing, no acute distress  Head: normocephalic and atraumatic  Global assessment-supple  Neck-No JVD noted, no lymphadenopathy  Pulmonary-clear to auscultation bilaterally, normal respiratory effort  Cardiovascular-normal rate and rhythm, normal heart sounds, S1 and S2 noted  Abdomen-soft, non tender, non distended, normal bowel sounds all 4 quadrants, no hepatosplenomegaly noted  Extremities-No clubbing cyanosis or edema  Neuro-Non focal, converses appropriately, awake, alert, oriented    Assessment & Plan     Diagnoses and all orders for this visit:    1. Hx of adenomatous colonic polyps (Primary)  Comments:  sessile serrated adenoma noted 2022  Orders:  -     Case Request; Standing  -     Case Request    2. HTN (hypertension), benign  Comments:  cont BP medication the day of procedure    3. Nonsmoker    Other orders  -     Implement Anesthesia Orders Day of Procedure; Standing  -     Follow Anesthesia Guidelines / Protocol; Future  -     Verify bowel prep was successful; Standing  -     sodium-potassium-magnesium sulfates (Suprep Bowel Prep Kit) 17.5-3.13-1.6 GM/177ML solution oral solution; Take as directed by office instructions provided  Dispense: 177 mL; Refill: 0        COLONOSCOPY WITH ANESTHESIA (N/A)  Body mass index is 33.36 kg/m².    Patient " instructions on prep prior to procedure provided to the patient.    All risks, benefits, alternatives, and indications of colonoscopy procedure have been discussed with the patient. Risks to include perforation of the colon requiring possible surgery or colostomy, risk of bleeding from biopsies or removal of colon tissue, possibility of missing a colon polyp or cancer, or adverse drug reaction.  Benefits to include the diagnosis and management of disease of the colon and rectum. Alternatives to include barium enema, radiographic evaluation, lab testing or no intervention. Pt verbalizes understanding and agrees.     Sophie Tavarez, APRN  2025  09:32 CST      IF YOU SMOKE OR USE TOBACCO PLEASE READ THE FOLLOWIN minutes reading provided    Why is smoking bad for me?  Smoking increases the risk of heart disease, lung disease, vascular disease, stroke, and cancer.     If you smoke, STOP!    If you would like more information on quitting smoking, please visit the [employer] website: [email or web address]   This link will provide additional resources including the QUIT line and the Beat the Pack support groups.     For more information:    Quit Now [place]  -QUIT-NOW  [email or web address]

## 2025-03-03 DIAGNOSIS — M54.41 CHRONIC BILATERAL LOW BACK PAIN WITH BILATERAL SCIATICA: ICD-10-CM

## 2025-03-03 DIAGNOSIS — M54.42 CHRONIC BILATERAL LOW BACK PAIN WITH BILATERAL SCIATICA: ICD-10-CM

## 2025-03-03 DIAGNOSIS — G89.29 CHRONIC BILATERAL LOW BACK PAIN WITH BILATERAL SCIATICA: ICD-10-CM

## 2025-03-03 RX ORDER — GABAPENTIN 300 MG/1
300 CAPSULE ORAL 3 TIMES DAILY
Qty: 90 CAPSULE | Refills: 2 | Status: SHIPPED | OUTPATIENT
Start: 2025-03-03

## 2025-03-06 ENCOUNTER — OFFICE VISIT (OUTPATIENT)
Dept: NEUROSURGERY | Facility: CLINIC | Age: 75
End: 2025-03-06
Payer: MEDICARE

## 2025-03-06 VITALS — HEIGHT: 72 IN | WEIGHT: 243 LBS | BODY MASS INDEX: 32.91 KG/M2

## 2025-03-06 DIAGNOSIS — M51.362 DEGENERATION OF INTERVERTEBRAL DISC OF LUMBAR REGION WITH DISCOGENIC BACK PAIN AND LOWER EXTREMITY PAIN: Primary | ICD-10-CM

## 2025-03-06 DIAGNOSIS — Z78.9 NONSMOKER: ICD-10-CM

## 2025-03-06 DIAGNOSIS — M48.062 LUMBAR STENOSIS WITH NEUROGENIC CLAUDICATION: ICD-10-CM

## 2025-03-06 DIAGNOSIS — E66.811 CLASS 1 OBESITY DUE TO EXCESS CALORIES WITH SERIOUS COMORBIDITY AND BODY MASS INDEX (BMI) OF 33.0 TO 33.9 IN ADULT: ICD-10-CM

## 2025-03-06 DIAGNOSIS — E66.09 CLASS 1 OBESITY DUE TO EXCESS CALORIES WITH SERIOUS COMORBIDITY AND BODY MASS INDEX (BMI) OF 33.0 TO 33.9 IN ADULT: ICD-10-CM

## 2025-03-06 PROCEDURE — 99214 OFFICE O/P EST MOD 30 MIN: CPT | Performed by: NEUROLOGICAL SURGERY

## 2025-03-06 NOTE — PATIENT INSTRUCTIONS
"PATIENT TO CONTINUE TO FOLLOW UP WITH HIS PRIMARY CARE PROVIDER FOR YEARLY PHYSICAL EXAMS TO ENSURE COMPLETE HEALTH MAINTENANCE      BMI for Adults  Body mass index (BMI) is a number found using a person's weight and height. BMI can help tell how much of a person's weight is made up of fat. BMI does not measure body fat directly. It is used instead of tests that directly measure body fat, which can be difficult and expensive.  What are BMI measurements used for?  BMI is useful to:  Find out if your weight puts you at higher risk for medical problems.  Help recommend changes, such as in diet and exercise. This can help you reach a healthy weight. BMI screening can be done again to see if these changes are working.  How is BMI calculated?  Your height and weight are measured. The BMI is found from those numbers. This can be done with [place] or metric measurements. Note that charts and online BMI calculators are available to help you find your BMI quickly and easily without doing these calculations.  To calculate your BMI in [place] measurements:  Measure your weight in pounds (lb).  Multiply the number of pounds by 703.  So, for an adult who weighs 150 lb, multiply that number by 703: 150 x 703, which equals 105,450.  Measure your height in inches. Then multiply that number by itself to get a measurement called \"inches squared.\"  So, for an adult who is 70 inches tall, the \"inches squared\" measurement is 70 inches x 70 inches, which equals 4,900 inches squared.  Divide the total from step 2 (number of lb x 703) by the total from step 3 (inches squared): 105,450 ÷ 4,900 = 21.5. This is your BMI.  To calculate your BMI in metric measurements:    Measure your weight in kilograms (kg).  For this example, the weight is 70 kg.  Measure your height in meters (m). Then multiply that number by itself to get a measurement called \"meters squared.\"  So, for an adult who is 1.75 m tall, the \"meters squared\" measurement is 1.75 m x 1.75 " m, which equals 3.1 meters squared.  Divide the number of kilograms (your weight) by the meters squared number. In this example: 70 ÷ 3.1 = 22.6. This is your BMI.  What do the results mean?  BMI charts are used to see if you are underweight, normal weight, overweight, or obese. The following guidelines will be used:  Underweight: BMI less than 18.5.  Normal weight: BMI between 18.5 and 24.9.  Overweight: BMI between 25 and 29.9.  Obese: BMI of 30 or above.  BMI is a tool and cannot diagnose a condition. Talk with your health care provider about what your BMI means for you. Keep these notes in mind:  Weight includes fat and muscle. Someone with a muscular build, such as an athlete, may have a BMI that is higher than 24.9. In cases like these, BMI is not a correct measure of body fat.  If you have a BMI of 25 or higher, your provider may need to do more testing to find out if excess body fat is the cause.  BMI is measured the same way for males and females. Females usually have more body fat than males of the same height and weight.  Where to find more information  For more information about BMI, including tools to quickly find your BMI, go to:  Centers for Disease Control and Prevention: cdc.gov  American Heart Association: heart.org  National Heart, Lung, and Blood [place]: nhlbi.nih.gov  This information is not intended to replace advice given to you by your health care provider. Make sure you discuss any questions you have with your health care provider.  Document Revised: 09/07/2023 Document Reviewed: 08/31/2023  [employer] Patient Education © 2024 [employer]DASH Eating Plan  DASH stands for Dietary Approaches to Stop Hypertension. The DASH eating plan is a healthy eating plan that has been shown to:  Lower high blood pressure (hypertension).  Reduce your risk for type 2 diabetes, heart disease, and stroke.  Help with weight loss.  What are tips for following this plan?  Reading food labels  Check food labels  "for the amount of salt (sodium) per serving. Choose foods with less than 5 percent of the Daily Value (DV) of sodium. In general, foods with less than 300 milligrams (mg) of sodium per serving fit into this eating plan.  To find whole grains, look for the word \"whole\" as the first word in the ingredient list.  Shopping  Buy products labeled as \"low-sodium\" or \"no salt added.\"  Buy fresh foods. Avoid canned foods and pre-made or frozen meals.  Cooking  Try not to add salt when you cook. Use salt-free seasonings or herbs instead of table salt or sea salt. Check with your health care provider or pharmacist before using salt substitutes.  Do not overton foods. Cook foods in healthy ways, such as baking, boiling, grilling, roasting, or broiling.  Cook using oils that are good for your heart. These include olive, canola, avocado, soybean, and sunflower oil.  Meal planning    Eat a balanced diet. This should include:  4 or more servings of fruits and 4 or more servings of vegetables each day. Try to fill half of your plate with fruits and vegetables.  6-8 servings of whole grains each day.  6 or less servings of lean meat, poultry, or fish each day. 1 oz is 1 serving. A 3 oz (85 g) serving of meat is about the same size as the palm of your hand. One egg is 1 oz (28 g).  2-3 servings of low-fat dairy each day. One serving is 1 cup (237 mL).  1 serving of nuts, seeds, or beans 5 times each week.  2-3 servings of heart-healthy fats. Healthy fats called omega-3 fatty acids are found in foods such as walnuts, flaxseeds, fortified milks, and eggs. These fats are also found in cold-water fish, such as sardines, salmon, and mackerel.  Limit how much you eat of:  Canned or prepackaged foods.  Food that is high in trans fat, such as fried foods.  Food that is high in saturated fat, such as fatty meat.  Desserts and other sweets, sugary drinks, and other foods with added sugar.  Full-fat dairy products.  Do not salt foods before " eating.  Do not eat more than 4 egg yolks a week.  Try to eat at least 2 vegetarian meals a week.  Eat more home-cooked food and less restaurant, buffet, and fast food.  Lifestyle  When eating at a restaurant, ask if your food can be made with less salt or no salt.  If you drink alcohol:  Limit how much you have to:  0-1 drink a day if you are female.  0-2 drinks a day if you are male.  Know how much alcohol is in your drink. In the [place] one drink is one 12 oz bottle of beer (355 mL), one 5 oz glass of wine (148 mL), or one 1½ oz glass of hard liquor (44 mL).  General information  Avoid eating more than 2,300 mg of salt a day. If you have hypertension, you may need to reduce your sodium intake to 1,500 mg a day.  Work with your provider to stay at a healthy body weight or lose weight. Ask what the best weight range is for you.  On most days of the week, get at least 30 minutes of exercise that causes your heart to beat faster. This may include walking, swimming, or biking.  Work with your provider or dietitian to adjust your eating plan to meet your specific calorie needs.  What foods should I eat?  Fruits  All fresh, dried, or frozen fruit. Canned fruits that are in their natural juice and do not have sugar added to them.  Vegetables  Fresh or frozen vegetables that are raw, steamed, roasted, or grilled. Low-sodium or reduced-sodium tomato and vegetable juice. Low-sodium or reduced-sodium tomato sauce and tomato paste. Low-sodium or reduced-sodium canned vegetables.  Grains  Whole-grain or whole-wheat bread. Whole-grain or whole-wheat pasta. Brown rice. Oatmeal. Quinoa. Bulgur. Whole-grain and low-sodium cereals. Tricia bread. Low-fat, low-sodium crackers. Whole-wheat flour tortillas.  Meats and other proteins  Skinless chicken or turkey. Ground chicken or turkey. Pork with fat trimmed off. Fish and seafood. Egg whites. Dried beans, peas, or lentils. Unsalted nuts, nut butters, and seeds. Unsalted canned beans. Lean  cuts of beef with fat trimmed off. Low-sodium, lean precooked or cured meat, such as sausages or meat loaves.  Dairy  Low-fat (1%) or fat-free (skim) milk. Reduced-fat, low-fat, or fat-free cheeses. Nonfat, low-sodium ricotta or cottage cheese. Low-fat or nonfat yogurt. Low-fat, low-sodium cheese.  Fats and oils  Soft margarine without trans fats. Vegetable oil. Reduced-fat, low-fat, or light mayonnaise and salad dressings (reduced-sodium). Canola, safflower, olive, avocado, soybean, and sunflower oils. Avocado.  Seasonings and condiments  Herbs. Spices. Seasoning mixes without salt.  Other foods  Unsalted popcorn and pretzels. Fat-free sweets.  The items listed above may not be all the foods and drinks you can have. Talk to a dietitian to learn more.  What foods should I avoid?  Fruits  Canned fruit in a light or heavy syrup. Fried fruit. Fruit in cream or butter sauce.  Vegetables  Creamed or fried vegetables. Vegetables in a cheese sauce. Regular canned vegetables that are not marked as low-sodium or reduced-sodium. Regular canned tomato sauce and paste that are not marked as low-sodium or reduced-sodium. Regular tomato and vegetable juices that are not marked as low-sodium or reduced-sodium. Pickles. Olives.  Grains  Baked goods made with fat, such as croissants, muffins, or some breads. Dry pasta or rice meal packs.  Meats and other proteins  Fatty cuts of meat. Ribs. Fried meat. [place]. Bologna, salami, and other precooked or cured meats, such as sausages or meat loaves, that are not lean and low in sodium. Fat from the back of a pig (fatback). Bratwurst. Salted nuts and seeds. Canned beans with added salt. Canned or smoked fish. Whole eggs or egg yolks. Chicken or turkey with skin.  Dairy  Whole or 2% milk, cream, and half-and-half. Whole or full-fat cream cheese. Whole-fat or sweetened yogurt. Full-fat cheese. Nondairy creamers. Whipped toppings. Processed cheese and cheese spreads.  Fats and oils  Butter.  Stick margarine. Lard. Shortening. Ghee. [place] fat. Tropical oils, such as coconut, palm kernel, or palm oil.  Seasonings and condiments  Onion salt, garlic salt, seasoned salt, table salt, and sea salt. Worcestershire sauce. Tartar sauce. Barbecue sauce. Teriyaki sauce. Soy sauce, including reduced-sodium soy sauce. Steak sauce. Canned and packaged gravies. Fish sauce. Oyster sauce. Cocktail sauce. Store-bought horseradish. Ketchup. Mustard. Meat flavorings and tenderizers. Bouillon cubes. Hot sauces. Pre-made or packaged marinades. Pre-made or packaged taco seasonings. Relishes. Regular salad dressings.  Other foods  Salted popcorn and pretzels.  The items listed above may not be all the foods and drinks you should avoid. Talk to a dietitian to learn more.  Where to find more information  National Heart, Lung, and Blood [place] (NHLBI): nhlbi.nih.gov  American Heart Association (AHA): heart.org  Academy of Nutrition and Dietetics: eatright.org  National Kidney Foundation (NKF): kidney.org  This information is not intended to replace advice given to you by your health care provider. Make sure you discuss any questions you have with your health care provider.  Document Revised: 01/04/2024 Document Reviewed: 01/04/2024  [employer] Patient Education © 2024 [employer]

## 2025-03-06 NOTE — PROGRESS NOTES
"SUBJECTIVE:  Patient ID: Jaime Mckeon is a 74 y.o. male is here today for follow-up.    Chief Complaint: Back pain  Chief Complaint   Patient presents with    PAIN MGMT FOLLOW UP     Patient states he has had 1 PMI and has gotten relief.  He says his back is not constant but still daily.  Patient states he continues to have BLE pain (R>L now) and he thinks he is \"dragging\" his left leg now.  He did therapy @ [facility] without relief.  Imaging BHP       HPI  74-year-old gentleman that we have been following for back pain and leg pain.  He is about 50% back pain and 50% bilateral lower extremity radicular pain.  He also describes a subjective weakness in the left leg at times.  The symptoms are claudicant in nature.  He did a dedicated course of physical therapy last year with limited benefit.  He is also worked with a chiropractor.  He has gotten 1 injection with Dr. Diane's group and got a significant improvement for the past 6 weeks.    The following portions of the patient's history were reviewed and updated as appropriate: allergies, current medications, past family history, past medical history, past social history, past surgical history and problem list.    OBJECTIVE:    Review of Systems   Musculoskeletal:  Positive for back pain.   All other systems reviewed and are negative.         Physical Exam  Constitutional:       General: He is awake.   Eyes:      Extraocular Movements: Extraocular movements intact.      Pupils: Pupils are equal, round, and reactive to light.   Neurological:      Motor: Motor strength is normal.     Deep Tendon Reflexes: Reflexes are normal and symmetric.   Psychiatric:         Speech: Speech normal.         Neurological Exam  Mental Status  Awake. Oriented to person, place and time. Speech is normal. Language is fluent with no aphasia. Attention and concentration are normal.    Cranial Nerves  CN I: Sense of smell is normal.  CN II: Right normal visual field. Left normal " visual field.  CN III, IV, VI: Extraocular movements intact bilaterally. Pupils equal round and reactive to light bilaterally.  CN V: Facial sensation is normal.  CN VII:  Right: There is no facial weakness.  Left: There is no facial weakness.  CN XI: Shoulder shrug strength is normal.  CN XII: Tongue midline without atrophy or fasciculations.    Motor  Normal muscle bulk throughout. Normal muscle tone. Strength is 5/5 throughout all four extremities.    Sensory  Sensation is intact to light touch, pinprick, vibration and proprioception in all four extremities.    Reflexes  Deep tendon reflexes are 2+ and symmetric in all four extremities.    Gait  Normal casual, toe, heel and tandem gait.      Independent Review of Radiographic Studies:       ASSESSMENT/PLAN:  Imaging shows multilevel degenerative disc disease and some degree of lumbar stenosis.  This is mostly involving L4-5 and L5 3 4.  Although there is some notable degeneration at L2-3 and stenosis at that level.  Right now is doing well with injections and conservative care.  I discussed with him that any consideration of surgery would probably involve a three-level instrumented fusion.  I would recommend that he exhaust all nonsurgical treatments prior to considering that.  He is agreeable to this.  He is fernanda continue to work with [facility] for now.  Will see him in follow-up in about 3 months.      1. Degeneration of intervertebral disc of lumbar region with discogenic back pain and lower extremity pain    2. Lumbar stenosis with neurogenic claudication    3. Nonsmoker    4. Class 1 obesity due to excess calories with serious comorbidity and body mass index (BMI) of 33.0 to 33.9 in adult        The patient's Body mass index is 33.28 kg/m².. BMI is above normal parameters. Recommendations include: educational material    Return in about 3 months (around 6/6/2025) for FOLLOW UP PANCHO/BARBARA (15 MINS).      Corby Edwards MD

## 2025-03-13 ENCOUNTER — HOSPITAL ENCOUNTER (OUTPATIENT)
Facility: HOSPITAL | Age: 75
Setting detail: HOSPITAL OUTPATIENT SURGERY
Discharge: HOME | End: 2025-03-13
Attending: INTERNAL MEDICINE | Admitting: INTERNAL MEDICINE
Payer: MEDICARE

## 2025-03-13 ENCOUNTER — ANESTHESIA EVENT (OUTPATIENT)
Dept: GASTROENTEROLOGY | Facility: HOSPITAL | Age: 75
End: 2025-03-13
Payer: MEDICARE

## 2025-03-13 ENCOUNTER — ANESTHESIA (OUTPATIENT)
Dept: GASTROENTEROLOGY | Facility: HOSPITAL | Age: 75
End: 2025-03-13
Payer: MEDICARE

## 2025-03-13 VITALS
TEMPERATURE: 96.8 F | OXYGEN SATURATION: 98 % | SYSTOLIC BLOOD PRESSURE: 146 MMHG | WEIGHT: 235 LBS | RESPIRATION RATE: 18 BRPM | HEART RATE: 77 BPM | HEIGHT: 73 IN | BODY MASS INDEX: 31.14 KG/M2 | DIASTOLIC BLOOD PRESSURE: 86 MMHG

## 2025-03-13 PROCEDURE — 25010000002 LIDOCAINE PF 2% 2 % SOLUTION

## 2025-03-13 PROCEDURE — 25810000003 SODIUM CHLORIDE 0.9 % SOLUTION: Performed by: ANESTHESIOLOGY

## 2025-03-13 PROCEDURE — G0105 COLORECTAL SCRN; HI RISK IND: HCPCS | Performed by: INTERNAL MEDICINE

## 2025-03-13 PROCEDURE — 25010000002 PROPOFOL 10 MG/ML EMULSION

## 2025-03-13 RX ORDER — PROPOFOL 10 MG/ML
VIAL (ML) INTRAVENOUS AS NEEDED
Status: DISCONTINUED | OUTPATIENT
Start: 2025-03-13 | End: 2025-03-13 | Stop reason: SURG

## 2025-03-13 RX ORDER — SODIUM CHLORIDE 9 MG/ML
500 INJECTION, SOLUTION INTRAVENOUS CONTINUOUS PRN
Status: DISCONTINUED | OUTPATIENT
Start: 2025-03-13 | End: 2025-03-13 | Stop reason: HOSPADM

## 2025-03-13 RX ORDER — LIDOCAINE HYDROCHLORIDE 20 MG/ML
INJECTION, SOLUTION EPIDURAL; INFILTRATION; INTRACAUDAL; PERINEURAL AS NEEDED
Status: DISCONTINUED | OUTPATIENT
Start: 2025-03-13 | End: 2025-03-13 | Stop reason: SURG

## 2025-03-13 RX ORDER — SODIUM CHLORIDE 0.9 % (FLUSH) 0.9 %
10 SYRINGE (ML) INJECTION AS NEEDED
Status: DISCONTINUED | OUTPATIENT
Start: 2025-03-13 | End: 2025-03-13 | Stop reason: HOSPADM

## 2025-03-13 RX ADMIN — LIDOCAINE HYDROCHLORIDE 100 MG: 20 INJECTION, SOLUTION EPIDURAL; INFILTRATION; INTRACAUDAL; PERINEURAL at 09:21

## 2025-03-13 RX ADMIN — PROPOFOL 300 MG: 10 INJECTION, EMULSION INTRAVENOUS at 09:21

## 2025-03-13 RX ADMIN — SODIUM CHLORIDE 500 ML: 9 INJECTION, SOLUTION INTRAVENOUS at 08:03

## 2025-03-13 NOTE — ANESTHESIA PREPROCEDURE EVALUATION
Anesthesia Evaluation     no history of anesthetic complications:   NPO Solid Status: > 8 hours  NPO Liquid Status: > 2 hours           Airway   Mallampati: III  Possible difficult intubation  Dental      Pulmonary    (+) a smoker Former,  Cardiovascular   Exercise tolerance: good (4-7 METS)    (+) hypertension, hyperlipidemia  (-) CAD      Neuro/Psych  (+) CVA  GI/Hepatic/Renal/Endo    (+) obesity, thyroid problem hypothyroidism  (-) liver disease, no renal disease, diabetes    Musculoskeletal     (+) back pain  Abdominal    Substance History      OB/GYN          Other   arthritis,                 Anesthesia Plan    ASA 3     MAC     intravenous induction     Anesthetic plan, risks, benefits, and alternatives have been provided, discussed and informed consent has been obtained with: patient.    CODE STATUS:

## 2025-03-13 NOTE — ANESTHESIA POSTPROCEDURE EVALUATION
"Patient: Jaime Mckeon    Procedure Summary       Date: 03/13/25 Room / Location: [facility] ENDOSCOPY 2 / [facility] END[facility]    Anesthesia Start: 0919 Anesthesia Stop: 0949    Procedure: COLONOSCOPY WITH ANESTHESIA Diagnosis:       Hx of adenomatous colonic polyps      (Hx of adenomatous colonic polyps [Z86.0101])    Surgeons: Elan Davis MD Provider: Gennaro Ferguson CRNA    Anesthesia Type: MAC ASA Status: 3            Anesthesia Type: MAC    Vitals  No vitals data found for the desired time range.          Post Anesthesia Care and Evaluation    Patient location during evaluation: PHASE II  Patient participation: complete - patient participated  Level of consciousness: awake  Pain management: adequate    Airway patency: patent  Anesthetic complications: No anesthetic complications    Cardiovascular status: acceptable  Respiratory status: acceptable  Hydration status: acceptable    Comments: /81 (BP Location: Right arm, Patient Position: Sitting)   Pulse 75   Temp 96.8 °F (36 °C) (Temporal)   Resp 18   Ht 185.4 cm (73\")   Wt 107 kg (235 lb)   SpO2 98%   BMI 31.00 kg/m²       "

## 2025-03-18 ENCOUNTER — TELEPHONE (OUTPATIENT)
Dept: GASTROENTEROLOGY | Facility: CLINIC | Age: 75
End: 2025-03-18
Payer: MEDICARE

## 2025-03-24 RX ORDER — DICLOFENAC SODIUM 75 MG/1
75 TABLET, DELAYED RELEASE ORAL 2 TIMES DAILY
Qty: 60 TABLET | Refills: 2 | Status: SHIPPED | OUTPATIENT
Start: 2025-03-24

## 2025-04-01 DIAGNOSIS — M54.41 CHRONIC BILATERAL LOW BACK PAIN WITH BILATERAL SCIATICA: ICD-10-CM

## 2025-04-01 DIAGNOSIS — G89.29 CHRONIC BILATERAL LOW BACK PAIN WITH BILATERAL SCIATICA: ICD-10-CM

## 2025-04-01 DIAGNOSIS — M54.42 CHRONIC BILATERAL LOW BACK PAIN WITH BILATERAL SCIATICA: ICD-10-CM

## 2025-04-01 RX ORDER — GABAPENTIN 300 MG/1
300 CAPSULE ORAL 3 TIMES DAILY
Qty: 90 CAPSULE | Refills: 2 | Status: SHIPPED | OUTPATIENT
Start: 2025-04-01

## 2025-05-26 DIAGNOSIS — G89.29 CHRONIC BILATERAL LOW BACK PAIN WITH BILATERAL SCIATICA: ICD-10-CM

## 2025-05-26 DIAGNOSIS — M54.41 CHRONIC BILATERAL LOW BACK PAIN WITH BILATERAL SCIATICA: ICD-10-CM

## 2025-05-26 DIAGNOSIS — M54.42 CHRONIC BILATERAL LOW BACK PAIN WITH BILATERAL SCIATICA: ICD-10-CM

## 2025-05-29 DIAGNOSIS — M48.062 LUMBAR STENOSIS WITH NEUROGENIC CLAUDICATION: ICD-10-CM

## 2025-05-29 DIAGNOSIS — G89.29 CHRONIC BILATERAL LOW BACK PAIN WITH BILATERAL SCIATICA: Primary | ICD-10-CM

## 2025-05-29 DIAGNOSIS — M51.362 DEGENERATION OF INTERVERTEBRAL DISC OF LUMBAR REGION WITH DISCOGENIC BACK PAIN AND LOWER EXTREMITY PAIN: ICD-10-CM

## 2025-05-29 DIAGNOSIS — M54.41 CHRONIC BILATERAL LOW BACK PAIN WITH BILATERAL SCIATICA: Primary | ICD-10-CM

## 2025-05-29 DIAGNOSIS — M54.42 CHRONIC BILATERAL LOW BACK PAIN WITH BILATERAL SCIATICA: Primary | ICD-10-CM

## 2025-05-29 RX ORDER — GABAPENTIN 600 MG/1
600 TABLET ORAL 3 TIMES DAILY
Qty: 90 TABLET | Refills: 2 | Status: SHIPPED | OUTPATIENT
Start: 2025-05-29

## 2025-06-03 ENCOUNTER — OFFICE VISIT (OUTPATIENT)
Dept: NEUROSURGERY | Facility: CLINIC | Age: 75
End: 2025-06-03
Payer: MEDICARE

## 2025-06-03 VITALS — WEIGHT: 234 LBS | BODY MASS INDEX: 31.01 KG/M2 | HEIGHT: 73 IN

## 2025-06-03 DIAGNOSIS — M51.362 DEGENERATION OF INTERVERTEBRAL DISC OF LUMBAR REGION WITH DISCOGENIC BACK PAIN AND LOWER EXTREMITY PAIN: ICD-10-CM

## 2025-06-03 DIAGNOSIS — M48.062 LUMBAR STENOSIS WITH NEUROGENIC CLAUDICATION: Primary | ICD-10-CM

## 2025-06-03 DIAGNOSIS — E66.811 CLASS 1 OBESITY DUE TO EXCESS CALORIES WITH SERIOUS COMORBIDITY AND BODY MASS INDEX (BMI) OF 30.0 TO 30.9 IN ADULT: ICD-10-CM

## 2025-06-03 DIAGNOSIS — Z78.9 NONSMOKER: ICD-10-CM

## 2025-06-03 DIAGNOSIS — E66.09 CLASS 1 OBESITY DUE TO EXCESS CALORIES WITH SERIOUS COMORBIDITY AND BODY MASS INDEX (BMI) OF 30.0 TO 30.9 IN ADULT: ICD-10-CM

## 2025-06-03 PROCEDURE — 1160F RVW MEDS BY RX/DR IN RCRD: CPT | Performed by: NEUROLOGICAL SURGERY

## 2025-06-03 PROCEDURE — 1159F MED LIST DOCD IN RCRD: CPT | Performed by: NEUROLOGICAL SURGERY

## 2025-06-03 PROCEDURE — 99214 OFFICE O/P EST MOD 30 MIN: CPT | Performed by: NEUROLOGICAL SURGERY

## 2025-06-03 NOTE — PATIENT INSTRUCTIONS
"PATIENT TO CONTINUE TO FOLLOW UP WITH HIS PRIMARY CARE PROVIDER FOR YEARLY PHYSICAL EXAMS TO ENSURE COMPLETE HEALTH MAINTENANCE    BMI for Adults  Body mass index (BMI) is a number found using a person's weight and height. BMI can help tell how much of a person's weight is made up of fat. BMI does not measure body fat directly. It is used instead of tests that directly measure body fat, which can be difficult and expensive.  What are BMI measurements used for?  BMI is useful to:  Find out if your weight puts you at higher risk for medical problems.  Help recommend changes, such as in diet and exercise. This can help you reach a healthy weight. BMI screening can be done again to see if these changes are working.  How is BMI calculated?  Your height and weight are measured. The BMI is found from those numbers. This can be done with [place] or metric measurements. Note that charts and online BMI calculators are available to help you find your BMI quickly and easily without doing these calculations.  To calculate your BMI in [place] measurements:  Measure your weight in pounds (lb).  Multiply the number of pounds by 703.  So, for an adult who weighs 150 lb, multiply that number by 703: 150 x 703, which equals 105,450.  Measure your height in inches. Then multiply that number by itself to get a measurement called \"inches squared.\"  So, for an adult who is 70 inches tall, the \"inches squared\" measurement is 70 inches x 70 inches, which equals 4,900 inches squared.  Divide the total from step 2 (number of lb x 703) by the total from step 3 (inches squared): 105,450 ÷ 4,900 = 21.5. This is your BMI.  To calculate your BMI in metric measurements:    Measure your weight in kilograms (kg).  For this example, the weight is 70 kg.  Measure your height in meters (m). Then multiply that number by itself to get a measurement called \"meters squared.\"  So, for an adult who is 1.75 m tall, the \"meters squared\" measurement is 1.75 m x 1.75 " m, which equals 3.1 meters squared.  Divide the number of kilograms (your weight) by the meters squared number. In this example: 70 ÷ 3.1 = 22.6. This is your BMI.  What do the results mean?  BMI charts are used to see if you are underweight, normal weight, overweight, or obese. The following guidelines will be used:  Underweight: BMI less than 18.5.  Normal weight: BMI between 18.5 and 24.9.  Overweight: BMI between 25 and 29.9.  Obese: BMI of 30 or above.  BMI is a tool and cannot diagnose a condition. Talk with your health care provider about what your BMI means for you. Keep these notes in mind:  Weight includes fat and muscle. Someone with a muscular build, such as an athlete, may have a BMI that is higher than 24.9. In cases like these, BMI is not a correct measure of body fat.  If you have a BMI of 25 or higher, your provider may need to do more testing to find out if excess body fat is the cause.  BMI is measured the same way for males and females. Females usually have more body fat than males of the same height and weight.  Where to find more information  For more information about BMI, including tools to quickly find your BMI, go to:  Centers for Disease Control and Prevention: cdc.gov  American Heart Association: heart.org  National Heart, Lung, and Blood [place]: nhlbi.nih.gov  This information is not intended to replace advice given to you by your health care provider. Make sure you discuss any questions you have with your health care provider.  Document Revised: 09/07/2023 Document Reviewed: 08/31/2023  [employer] Patient Education © 2024 [employer]DASH Eating Plan  DASH stands for Dietary Approaches to Stop Hypertension. The DASH eating plan is a healthy eating plan that has been shown to:  Lower high blood pressure (hypertension).  Reduce your risk for type 2 diabetes, heart disease, and stroke.  Help with weight loss.  What are tips for following this plan?  Reading food labels  Check food labels  "for the amount of salt (sodium) per serving. Choose foods with less than 5 percent of the Daily Value (DV) of sodium. In general, foods with less than 300 milligrams (mg) of sodium per serving fit into this eating plan.  To find whole grains, look for the word \"whole\" as the first word in the ingredient list.  Shopping  Buy products labeled as \"low-sodium\" or \"no salt added.\"  Buy fresh foods. Avoid canned foods and pre-made or frozen meals.  Cooking  Try not to add salt when you cook. Use salt-free seasonings or herbs instead of table salt or sea salt. Check with your health care provider or pharmacist before using salt substitutes.  Do not overton foods. Cook foods in healthy ways, such as baking, boiling, grilling, roasting, or broiling.  Cook using oils that are good for your heart. These include olive, canola, avocado, soybean, and sunflower oil.  Meal planning    Eat a balanced diet. This should include:  4 or more servings of fruits and 4 or more servings of vegetables each day. Try to fill half of your plate with fruits and vegetables.  6-8 servings of whole grains each day.  6 or less servings of lean meat, poultry, or fish each day. 1 oz is 1 serving. A 3 oz (85 g) serving of meat is about the same size as the palm of your hand. One egg is 1 oz (28 g).  2-3 servings of low-fat dairy each day. One serving is 1 cup (237 mL).  1 serving of nuts, seeds, or beans 5 times each week.  2-3 servings of heart-healthy fats. Healthy fats called omega-3 fatty acids are found in foods such as walnuts, flaxseeds, fortified milks, and eggs. These fats are also found in cold-water fish, such as sardines, salmon, and mackerel.  Limit how much you eat of:  Canned or prepackaged foods.  Food that is high in trans fat, such as fried foods.  Food that is high in saturated fat, such as fatty meat.  Desserts and other sweets, sugary drinks, and other foods with added sugar.  Full-fat dairy products.  Do not salt foods before " eating.  Do not eat more than 4 egg yolks a week.  Try to eat at least 2 vegetarian meals a week.  Eat more home-cooked food and less restaurant, buffet, and fast food.  Lifestyle  When eating at a restaurant, ask if your food can be made with less salt or no salt.  If you drink alcohol:  Limit how much you have to:  0-1 drink a day if you are female.  0-2 drinks a day if you are male.  Know how much alcohol is in your drink. In the [place] one drink is one 12 oz bottle of beer (355 mL), one 5 oz glass of wine (148 mL), or one 1½ oz glass of hard liquor (44 mL).  General information  Avoid eating more than 2,300 mg of salt a day. If you have hypertension, you may need to reduce your sodium intake to 1,500 mg a day.  Work with your provider to stay at a healthy body weight or lose weight. Ask what the best weight range is for you.  On most days of the week, get at least 30 minutes of exercise that causes your heart to beat faster. This may include walking, swimming, or biking.  Work with your provider or dietitian to adjust your eating plan to meet your specific calorie needs.  What foods should I eat?  Fruits  All fresh, dried, or frozen fruit. Canned fruits that are in their natural juice and do not have sugar added to them.  Vegetables  Fresh or frozen vegetables that are raw, steamed, roasted, or grilled. Low-sodium or reduced-sodium tomato and vegetable juice. Low-sodium or reduced-sodium tomato sauce and tomato paste. Low-sodium or reduced-sodium canned vegetables.  Grains  Whole-grain or whole-wheat bread. Whole-grain or whole-wheat pasta. Brown rice. Oatmeal. Quinoa. Bulgur. Whole-grain and low-sodium cereals. Tricia bread. Low-fat, low-sodium crackers. Whole-wheat flour tortillas.  Meats and other proteins  Skinless chicken or turkey. Ground chicken or turkey. Pork with fat trimmed off. Fish and seafood. Egg whites. Dried beans, peas, or lentils. Unsalted nuts, nut butters, and seeds. Unsalted canned beans. Lean  cuts of beef with fat trimmed off. Low-sodium, lean precooked or cured meat, such as sausages or meat loaves.  Dairy  Low-fat (1%) or fat-free (skim) milk. Reduced-fat, low-fat, or fat-free cheeses. Nonfat, low-sodium ricotta or cottage cheese. Low-fat or nonfat yogurt. Low-fat, low-sodium cheese.  Fats and oils  Soft margarine without trans fats. Vegetable oil. Reduced-fat, low-fat, or light mayonnaise and salad dressings (reduced-sodium). Canola, safflower, olive, avocado, soybean, and sunflower oils. Avocado.  Seasonings and condiments  Herbs. Spices. Seasoning mixes without salt.  Other foods  Unsalted popcorn and pretzels. Fat-free sweets.  The items listed above may not be all the foods and drinks you can have. Talk to a dietitian to learn more.  What foods should I avoid?  Fruits  Canned fruit in a light or heavy syrup. Fried fruit. Fruit in cream or butter sauce.  Vegetables  Creamed or fried vegetables. Vegetables in a cheese sauce. Regular canned vegetables that are not marked as low-sodium or reduced-sodium. Regular canned tomato sauce and paste that are not marked as low-sodium or reduced-sodium. Regular tomato and vegetable juices that are not marked as low-sodium or reduced-sodium. Pickles. Olives.  Grains  Baked goods made with fat, such as croissants, muffins, or some breads. Dry pasta or rice meal packs.  Meats and other proteins  Fatty cuts of meat. Ribs. Fried meat. Irving. Bologna, salami, and other precooked or cured meats, such as sausages or meat loaves, that are not lean and low in sodium. Fat from the back of a pig (fatback). Bratwurst. Salted nuts and seeds. Canned beans with added salt. Canned or smoked fish. Whole eggs or egg yolks. Chicken or turkey with skin.  Dairy  Whole or 2% milk, cream, and half-and-half. Whole or full-fat cream cheese. Whole-fat or sweetened yogurt. Full-fat cheese. Nondairy creamers. Whipped toppings. Processed cheese and cheese spreads.  Fats and oils  Butter.  Stick margarine. Lard. Shortening. Ghee. Irving fat. Tropical oils, such as coconut, palm kernel, or palm oil.  Seasonings and condiments  Onion salt, garlic salt, seasoned salt, table salt, and sea salt. Worcestershire sauce. Tartar sauce. Barbecue sauce. Teriyaki sauce. Soy sauce, including reduced-sodium soy sauce. Steak sauce. Canned and packaged gravies. Fish sauce. Oyster sauce. Cocktail sauce. Store-bought horseradish. Ketchup. Mustard. Meat flavorings and tenderizers. Bouillon cubes. Hot sauces. Pre-made or packaged marinades. Pre-made or packaged taco seasonings. Relishes. Regular salad dressings.  Other foods  Salted popcorn and pretzels.  The items listed above may not be all the foods and drinks you should avoid. Talk to a dietitian to learn more.  Where to find more information  National Heart, Lung, and Blood [place] (NHLBI): nhlbi.nih.gov  American Heart Association (AHA): heart.org  Academy of Nutrition and Dietetics: eatright.org  National Kidney Foundation (NKF): kidney.org  This information is not intended to replace advice given to you by your health care provider. Make sure you discuss any questions you have with your health care provider.  Document Revised: 01/04/2024 Document Reviewed: 01/04/2024  [employer] Patient Education © 2024 [employer]

## 2025-06-03 NOTE — PROGRESS NOTES
SUBJECTIVE:  Patient ID: Jaime Mckeon is a 75 y.o. male is here today for follow-up.    Chief Complaint: Back pain  Chief Complaint   Patient presents with    SURGICAL DISCUSSION     Patient continues to have back & BLE pain and LLE weakness.  [facility] without relief of his symptoms.  He has worked with [facility], had injections, but isn't getting relief from the injections.  [facility] imaging       HPI  75-year-old gentleman that we have been following for back pain and leg pain. He is about 50% back pain and 50% bilateral lower extremity radicular pain.  The pain in the left leg tends to be worse than the pain in the right.  He also describes a subjective weakness in the left leg at times. The symptoms are claudicant in nature. He did a dedicated course of physical therapy last year with limited benefit. He is also worked with a chiropractor.   He has been working with [facility] and injection treatments recently.  He is only gotten minimal relief from the most recent injections.  He is also on gabapentin 603 times a day which he says is helpful.  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    The following portions of the patient's history were reviewed and updated as appropriate: allergies, current medications, past family history, past medical history, past social history, past surgical history and problem list.    OBJECTIVE:    Review of Systems   Musculoskeletal:  Positive for back pain.   All other systems reviewed and are negative.         Physical Exam  Constitutional:       General: He is awake.   Eyes:      Extraocular Movements: Extraocular movements intact.      Pupils: Pupils are equal, round, and reactive to light.   Neurological:      Motor: Motor strength is normal.     Deep Tendon Reflexes: Reflexes are normal and symmetric.   Psychiatric:         Speech: Speech normal.         Neurological Exam  Mental Status  Awake. Oriented to person, place and time. Speech is  normal. Language is fluent with no aphasia. Attention and concentration are normal.    Cranial Nerves  CN I: Sense of smell is normal.  CN II: Right normal visual field. Left normal visual field.  CN III, IV, VI: Extraocular movements intact bilaterally. Pupils equal round and reactive to light bilaterally.  CN V: Facial sensation is normal.  CN VII:  Right: There is no facial weakness.  Left: There is no facial weakness.  CN XI: Shoulder shrug strength is normal.  CN XII: Tongue midline without atrophy or fasciculations.    Motor  Normal muscle bulk throughout. Normal muscle tone. Strength is 5/5 throughout all four extremities.    Sensory  Sensation is intact to light touch, pinprick, vibration and proprioception in all four extremities.    Reflexes  Deep tendon reflexes are 2+ and symmetric in all four extremities.    Gait  Normal casual, toe, heel and tandem gait.      Independent Review of Radiographic Studies:       ASSESSMENT/PLAN:  The patient complains of back pain and bilateral lower extremity pain.  He is gone through an extensive course of nonsurgical care including physical therapy, pain management injections and medical management.  At this point I would recommend a left L2-3, 3 4, 4 5 transforaminal lumbar interbody fusion to treat this issue.  The risk and benefits were discussed at length which included but were not limited to infection, bleeding, paralysis, spinal fluid leak, bowel bladder incontinence, stroke, coma, and death.  The patient acknowledged understand this.  His questions and concerns were addressed.      1. Lumbar stenosis with neurogenic claudication    2. Degeneration of intervertebral disc of lumbar region with discogenic back pain and lower extremity pain    3. Nonsmoker    4. Class 1 obesity due to excess calories with serious comorbidity and body mass index (BMI) of 30.0 to 30.9 in adult        The patient's Body mass index is 30.87 kg/m².. BMI is above normal parameters.  Recommendations include: educational material    Return for POSTOPERATIVELY.      Corby Edwards MD

## 2025-06-12 RX ORDER — GABAPENTIN 300 MG/1
300 CAPSULE ORAL 3 TIMES DAILY
Qty: 90 CAPSULE | Refills: 2 | Status: SHIPPED | OUTPATIENT
Start: 2025-06-12

## 2025-06-25 ENCOUNTER — HOSPITAL ENCOUNTER (OUTPATIENT)
Dept: CT IMAGING | Facility: HOSPITAL | Age: 75
Discharge: HOME | End: 2025-06-25
Admitting: NEUROLOGICAL SURGERY
Payer: MEDICARE

## 2025-06-25 DIAGNOSIS — M48.062 LUMBAR STENOSIS WITH NEUROGENIC CLAUDICATION: ICD-10-CM

## 2025-06-25 DIAGNOSIS — M51.362 DEGENERATION OF INTERVERTEBRAL DISC OF LUMBAR REGION WITH DISCOGENIC BACK PAIN AND LOWER EXTREMITY PAIN: ICD-10-CM

## 2025-06-25 PROCEDURE — 72131 CT LUMBAR SPINE W/O DYE: CPT

## 2025-07-02 ENCOUNTER — PRE-ADMISSION TESTING (OUTPATIENT)
Dept: PREADMISSION TESTING | Facility: HOSPITAL | Age: 75
End: 2025-07-02
Payer: MEDICARE

## 2025-07-02 VITALS
DIASTOLIC BLOOD PRESSURE: 74 MMHG | HEART RATE: 80 BPM | HEIGHT: 71 IN | OXYGEN SATURATION: 98 % | RESPIRATION RATE: 16 BRPM | SYSTOLIC BLOOD PRESSURE: 126 MMHG | BODY MASS INDEX: 33.36 KG/M2 | WEIGHT: 238.32 LBS

## 2025-07-02 LAB
ANION GAP SERPL CALCULATED.3IONS-SCNC: 11 MMOL/L (ref 5–15)
BUN SERPL-MCNC: 22.4 MG/DL (ref 8–23)
BUN/CREAT SERPL: 22.9 (ref 7–25)
CALCIUM SPEC-SCNC: 9.3 MG/DL (ref 8.6–10.5)
CHLORIDE SERPL-SCNC: 101 MMOL/L (ref 98–107)
CO2 SERPL-SCNC: 24 MMOL/L (ref 22–29)
CREAT SERPL-MCNC: 0.98 MG/DL (ref 0.76–1.27)
DEPRECATED RDW RBC AUTO: 45.6 FL (ref 37–54)
EGFRCR SERPLBLD CKD-EPI 2021: 80.9 ML/MIN/1.73
ERYTHROCYTE [DISTWIDTH] IN BLOOD BY AUTOMATED COUNT: 14.2 % (ref 12.3–15.4)
GLUCOSE SERPL-MCNC: 110 MG/DL (ref 65–99)
HCT VFR BLD AUTO: 38.2 % (ref 37.5–51)
HGB BLD-MCNC: 12.1 G/DL (ref 13–17.7)
MCH RBC QN AUTO: 28.5 PG (ref 26.6–33)
MCHC RBC AUTO-ENTMCNC: 31.7 G/DL (ref 31.5–35.7)
MCV RBC AUTO: 90.1 FL (ref 79–97)
PLATELET # BLD AUTO: 250 10*3/MM3 (ref 140–450)
PMV BLD AUTO: 11.8 FL (ref 6–12)
POTASSIUM SERPL-SCNC: 4.6 MMOL/L (ref 3.5–5.2)
RBC # BLD AUTO: 4.24 10*6/MM3 (ref 4.14–5.8)
SODIUM SERPL-SCNC: 136 MMOL/L (ref 136–145)
WBC NRBC COR # BLD AUTO: 7.17 10*3/MM3 (ref 3.4–10.8)

## 2025-07-02 PROCEDURE — 93005 ELECTROCARDIOGRAM TRACING: CPT

## 2025-07-02 PROCEDURE — 80048 BASIC METABOLIC PNL TOTAL CA: CPT

## 2025-07-02 PROCEDURE — 85027 COMPLETE CBC AUTOMATED: CPT

## 2025-07-02 PROCEDURE — 36415 COLL VENOUS BLD VENIPUNCTURE: CPT

## 2025-07-02 NOTE — DISCHARGE INSTRUCTIONS

## 2025-07-07 LAB
QT INTERVAL: 382 MS
QTC INTERVAL: 409 MS

## 2025-07-14 ENCOUNTER — ANESTHESIA (OUTPATIENT)
Dept: PERIOP | Facility: HOSPITAL | Age: 75
End: 2025-07-14
Payer: MEDICARE

## 2025-07-14 ENCOUNTER — APPOINTMENT (OUTPATIENT)
Dept: GENERAL RADIOLOGY | Facility: HOSPITAL | Age: 75
End: 2025-07-14
Payer: MEDICARE

## 2025-07-14 ENCOUNTER — ANESTHESIA EVENT (OUTPATIENT)
Dept: PERIOP | Facility: HOSPITAL | Age: 75
End: 2025-07-14
Payer: MEDICARE

## 2025-07-14 ENCOUNTER — HOSPITAL ENCOUNTER (OUTPATIENT)
Facility: HOSPITAL | Age: 75
LOS: 1 days | Discharge: HOME | End: 2025-07-17
Attending: NEUROLOGICAL SURGERY | Admitting: NEUROLOGICAL SURGERY
Payer: MEDICARE

## 2025-07-14 DIAGNOSIS — M48.062 LUMBAR STENOSIS WITH NEUROGENIC CLAUDICATION: ICD-10-CM

## 2025-07-14 DIAGNOSIS — M51.360 DEGENERATION OF INTERVERTEBRAL DISC OF LUMBAR REGION WITH DISCOGENIC BACK PAIN: ICD-10-CM

## 2025-07-14 DIAGNOSIS — M51.362 DEGENERATION OF INTERVERTEBRAL DISC OF LUMBAR REGION WITH DISCOGENIC BACK PAIN AND LOWER EXTREMITY PAIN: ICD-10-CM

## 2025-07-14 DIAGNOSIS — Z74.09 IMPAIRED MOBILITY: Primary | ICD-10-CM

## 2025-07-14 PROBLEM — M51.369 LUMBAR DEGENERATIVE DISC DISEASE: Status: ACTIVE | Noted: 2025-07-14

## 2025-07-14 PROCEDURE — 25010000002 DEXAMETHASONE PER 1 MG: Performed by: NURSE ANESTHETIST, CERTIFIED REGISTERED

## 2025-07-14 PROCEDURE — 25010000002 HYDROMORPHONE PER 4 MG: Performed by: ANESTHESIOLOGY

## 2025-07-14 PROCEDURE — 25010000002 MORPHINE PER 10 MG: Performed by: NURSE PRACTITIONER

## 2025-07-14 PROCEDURE — 25010000002 ONDANSETRON PER 1 MG: Performed by: NURSE ANESTHETIST, CERTIFIED REGISTERED

## 2025-07-14 PROCEDURE — C1713 ANCHOR/SCREW BN/BN,TIS/BN: HCPCS | Performed by: NEUROLOGICAL SURGERY

## 2025-07-14 PROCEDURE — 25010000002 HYDROMORPHONE 1 MG/ML SOLUTION: Performed by: NURSE ANESTHETIST, CERTIFIED REGISTERED

## 2025-07-14 PROCEDURE — 25010000002 CEFAZOLIN PER 500 MG: Performed by: NURSE PRACTITIONER

## 2025-07-14 PROCEDURE — 25810000003 SODIUM CHLORIDE 0.9 % SOLUTION: Performed by: NURSE PRACTITIONER

## 2025-07-14 PROCEDURE — 25010000002 PROPOFOL 10 MG/ML EMULSION: Performed by: NURSE ANESTHETIST, CERTIFIED REGISTERED

## 2025-07-14 PROCEDURE — 63053 LAM FACTC/FRMT ARTHRD LUM EA: CPT | Performed by: NEUROLOGICAL SURGERY

## 2025-07-14 PROCEDURE — 25010000002 LIDOCAINE PF 2% 2 % SOLUTION: Performed by: NURSE ANESTHETIST, CERTIFIED REGISTERED

## 2025-07-14 PROCEDURE — 63052 LAM FACETC/FRMT ARTHRD LUM 1: CPT | Performed by: NEUROLOGICAL SURGERY

## 2025-07-14 PROCEDURE — 76000 FLUOROSCOPY <1 HR PHYS/QHP: CPT

## 2025-07-14 PROCEDURE — 25010000002 CEFAZOLIN PER 500 MG: Performed by: NEUROLOGICAL SURGERY

## 2025-07-14 PROCEDURE — 61783 SCAN PROC SPINAL: CPT | Performed by: NEUROLOGICAL SURGERY

## 2025-07-14 PROCEDURE — 88304 TISSUE EXAM BY PATHOLOGIST: CPT | Performed by: NEUROLOGICAL SURGERY

## 2025-07-14 PROCEDURE — 22632 ARTHRD PST TQ 1NTRSPC LM EA: CPT | Performed by: NEUROLOGICAL SURGERY

## 2025-07-14 PROCEDURE — 22853 INSJ BIOMECHANICAL DEVICE: CPT | Performed by: NEUROLOGICAL SURGERY

## 2025-07-14 PROCEDURE — 25010000002 ONDANSETRON PER 1 MG: Performed by: NURSE PRACTITIONER

## 2025-07-14 PROCEDURE — 22842 INSERT SPINE FIXATION DEVICE: CPT | Performed by: NEUROLOGICAL SURGERY

## 2025-07-14 PROCEDURE — 88311 DECALCIFY TISSUE: CPT | Performed by: NEUROLOGICAL SURGERY

## 2025-07-14 PROCEDURE — S0260 H&P FOR SURGERY: HCPCS | Performed by: NEUROLOGICAL SURGERY

## 2025-07-14 PROCEDURE — 22630 ARTHRD PST TQ 1NTRSPC LUM: CPT | Performed by: NEUROLOGICAL SURGERY

## 2025-07-14 PROCEDURE — 25010000002 GLYCOPYRROLATE 0.4 MG/2ML SOLUTION: Performed by: NURSE ANESTHETIST, CERTIFIED REGISTERED

## 2025-07-14 PROCEDURE — 25010000002 FENTANYL CITRATE (PF) 50 MCG/ML SOLUTION: Performed by: NURSE ANESTHETIST, CERTIFIED REGISTERED

## 2025-07-14 PROCEDURE — 25810000003 LACTATED RINGERS PER 1000 ML: Performed by: NEUROLOGICAL SURGERY

## 2025-07-14 PROCEDURE — 25010000002 FENTANYL CITRATE (PF) 50 MCG/ML SOLUTION: Performed by: ANESTHESIOLOGY

## 2025-07-14 PROCEDURE — 97165 OT EVAL LOW COMPLEX 30 MIN: CPT

## 2025-07-14 DEVICE — CP BUNDLE UNID EXP ROD 1TO3/LVL NATL CUST: Type: IMPLANTABLE DEVICE | Site: SPINE LUMBAR | Status: FUNCTIONAL

## 2025-07-14 DEVICE — HEMOST ABS SURGIFOAM SZ100 8X12 10MM: Type: IMPLANTABLE DEVICE | Site: SPINE LUMBAR | Status: FUNCTIONAL

## 2025-07-14 DEVICE — SCRW ST SOLERA VOYAGER BRKOFF TI 4.75MM: Type: IMPLANTABLE DEVICE | Site: SPINE LUMBAR | Status: FUNCTIONAL

## 2025-07-14 DEVICE — MAS 54850017535 4.75 EXT TAB CANN 7.5X35
Type: IMPLANTABLE DEVICE | Site: SPINE LUMBAR | Status: FUNCTIONAL
Brand: CD HORIZON® SOLERA® SPINAL SYSTEM

## 2025-07-14 DEVICE — SPACER 6068076 CATALYFT PL LONG 7MM
Type: IMPLANTABLE DEVICE | Site: SPINE LUMBAR | Status: FUNCTIONAL
Brand: CATALYFT PL EXPANDABLE INTERBODY SYSTEM

## 2025-07-14 DEVICE — ROD SPINE CUST PERC W/PLAN/GUID 1TO3/LVL COCR 4.75MM: Type: IMPLANTABLE DEVICE | Site: SPINE LUMBAR | Status: FUNCTIONAL

## 2025-07-14 DEVICE — DBM T43105 5CC GRAFTON PUTTY
Type: IMPLANTABLE DEVICE | Site: SPINE LUMBAR | Status: FUNCTIONAL
Brand: GRAFTON®AND GRAFTON PLUS®DEMINERALIZED BONE MATRIX (DBM)

## 2025-07-14 DEVICE — KT HEMOST ABS SURGIFOAM PORCN 1GRAM: Type: IMPLANTABLE DEVICE | Site: SPINE LUMBAR | Status: FUNCTIONAL

## 2025-07-14 RX ORDER — LIDOCAINE HYDROCHLORIDE 10 MG/ML
0.5 INJECTION, SOLUTION EPIDURAL; INFILTRATION; INTRACAUDAL; PERINEURAL ONCE AS NEEDED
Status: DISCONTINUED | OUTPATIENT
Start: 2025-07-14 | End: 2025-07-14 | Stop reason: HOSPADM

## 2025-07-14 RX ORDER — SUCCINYLCHOLINE/SOD CL,ISO/PF 200MG/10ML
SYRINGE (ML) INTRAVENOUS AS NEEDED
Status: DISCONTINUED | OUTPATIENT
Start: 2025-07-14 | End: 2025-07-14 | Stop reason: SURG

## 2025-07-14 RX ORDER — GABAPENTIN 300 MG/1
600 CAPSULE ORAL EVERY 8 HOURS SCHEDULED
Status: DISCONTINUED | OUTPATIENT
Start: 2025-07-14 | End: 2025-07-17 | Stop reason: HOSPADM

## 2025-07-14 RX ORDER — AMLODIPINE BESYLATE 10 MG/1
10 TABLET ORAL DAILY
Status: DISCONTINUED | OUTPATIENT
Start: 2025-07-14 | End: 2025-07-17 | Stop reason: HOSPADM

## 2025-07-14 RX ORDER — SODIUM CHLORIDE 0.9 % (FLUSH) 0.9 %
10 SYRINGE (ML) INJECTION AS NEEDED
Status: DISCONTINUED | OUTPATIENT
Start: 2025-07-14 | End: 2025-07-17 | Stop reason: HOSPADM

## 2025-07-14 RX ORDER — AMOXICILLIN 250 MG
2 CAPSULE ORAL 2 TIMES DAILY
Status: DISCONTINUED | OUTPATIENT
Start: 2025-07-14 | End: 2025-07-17 | Stop reason: HOSPADM

## 2025-07-14 RX ORDER — SODIUM CHLORIDE, SODIUM LACTATE, POTASSIUM CHLORIDE, CALCIUM CHLORIDE 600; 310; 30; 20 MG/100ML; MG/100ML; MG/100ML; MG/100ML
1000 INJECTION, SOLUTION INTRAVENOUS CONTINUOUS
Status: DISCONTINUED | OUTPATIENT
Start: 2025-07-14 | End: 2025-07-14

## 2025-07-14 RX ORDER — SODIUM CHLORIDE 9 MG/ML
75 INJECTION, SOLUTION INTRAVENOUS CONTINUOUS
Status: DISPENSED | OUTPATIENT
Start: 2025-07-14 | End: 2025-07-16

## 2025-07-14 RX ORDER — SODIUM CHLORIDE 9 MG/ML
30 INJECTION, SOLUTION INTRAVENOUS CONTINUOUS PRN
Status: DISCONTINUED | OUTPATIENT
Start: 2025-07-14 | End: 2025-07-15

## 2025-07-14 RX ORDER — CYCLOBENZAPRINE HCL 10 MG
10 TABLET ORAL 3 TIMES DAILY PRN
Status: DISCONTINUED | OUTPATIENT
Start: 2025-07-14 | End: 2025-07-17 | Stop reason: HOSPADM

## 2025-07-14 RX ORDER — ONDANSETRON 2 MG/ML
4 INJECTION INTRAMUSCULAR; INTRAVENOUS EVERY 6 HOURS PRN
Status: DISCONTINUED | OUTPATIENT
Start: 2025-07-14 | End: 2025-07-17 | Stop reason: HOSPADM

## 2025-07-14 RX ORDER — LOSARTAN POTASSIUM 50 MG/1
100 TABLET ORAL
Status: DISCONTINUED | OUTPATIENT
Start: 2025-07-14 | End: 2025-07-17 | Stop reason: HOSPADM

## 2025-07-14 RX ORDER — HYDROMORPHONE HYDROCHLORIDE 1 MG/ML
0.5 INJECTION, SOLUTION INTRAMUSCULAR; INTRAVENOUS; SUBCUTANEOUS
Status: DISCONTINUED | OUTPATIENT
Start: 2025-07-14 | End: 2025-07-14 | Stop reason: HOSPADM

## 2025-07-14 RX ORDER — OXYCODONE AND ACETAMINOPHEN 10; 325 MG/1; MG/1
1 TABLET ORAL EVERY 4 HOURS PRN
Status: DISCONTINUED | OUTPATIENT
Start: 2025-07-14 | End: 2025-07-14 | Stop reason: HOSPADM

## 2025-07-14 RX ORDER — SODIUM CHLORIDE 0.9 % (FLUSH) 0.9 %
3 SYRINGE (ML) INJECTION EVERY 12 HOURS SCHEDULED
Status: DISCONTINUED | OUTPATIENT
Start: 2025-07-14 | End: 2025-07-17 | Stop reason: HOSPADM

## 2025-07-14 RX ORDER — GLYCOPYRROLATE 0.2 MG/ML
INJECTION INTRAMUSCULAR; INTRAVENOUS AS NEEDED
Status: DISCONTINUED | OUTPATIENT
Start: 2025-07-14 | End: 2025-07-14 | Stop reason: SURG

## 2025-07-14 RX ORDER — PROPOFOL 10 MG/ML
VIAL (ML) INTRAVENOUS AS NEEDED
Status: DISCONTINUED | OUTPATIENT
Start: 2025-07-14 | End: 2025-07-14 | Stop reason: SURG

## 2025-07-14 RX ORDER — BISACODYL 10 MG
10 SUPPOSITORY, RECTAL RECTAL DAILY PRN
Status: DISCONTINUED | OUTPATIENT
Start: 2025-07-14 | End: 2025-07-17 | Stop reason: HOSPADM

## 2025-07-14 RX ORDER — SODIUM CHLORIDE 0.9 % (FLUSH) 0.9 %
10 SYRINGE (ML) INJECTION AS NEEDED
Status: DISCONTINUED | OUTPATIENT
Start: 2025-07-14 | End: 2025-07-14 | Stop reason: HOSPADM

## 2025-07-14 RX ORDER — LIDOCAINE HYDROCHLORIDE 20 MG/ML
INJECTION, SOLUTION EPIDURAL; INFILTRATION; INTRACAUDAL; PERINEURAL AS NEEDED
Status: DISCONTINUED | OUTPATIENT
Start: 2025-07-14 | End: 2025-07-14 | Stop reason: SURG

## 2025-07-14 RX ORDER — ONDANSETRON 2 MG/ML
4 INJECTION INTRAMUSCULAR; INTRAVENOUS
Status: DISCONTINUED | OUTPATIENT
Start: 2025-07-14 | End: 2025-07-14 | Stop reason: HOSPADM

## 2025-07-14 RX ORDER — EPHEDRINE SULFATE 50 MG/ML
INJECTION INTRAVENOUS AS NEEDED
Status: DISCONTINUED | OUTPATIENT
Start: 2025-07-14 | End: 2025-07-14 | Stop reason: SURG

## 2025-07-14 RX ORDER — LATANOPROST 50 UG/ML
1 SOLUTION/ DROPS OPHTHALMIC NIGHTLY
Status: DISCONTINUED | OUTPATIENT
Start: 2025-07-14 | End: 2025-07-17 | Stop reason: HOSPADM

## 2025-07-14 RX ORDER — DEXTROSE MONOHYDRATE 25 G/50ML
12.5 INJECTION, SOLUTION INTRAVENOUS AS NEEDED
Status: DISCONTINUED | OUTPATIENT
Start: 2025-07-14 | End: 2025-07-14 | Stop reason: HOSPADM

## 2025-07-14 RX ORDER — ONDANSETRON 2 MG/ML
INJECTION INTRAMUSCULAR; INTRAVENOUS AS NEEDED
Status: DISCONTINUED | OUTPATIENT
Start: 2025-07-14 | End: 2025-07-14 | Stop reason: SURG

## 2025-07-14 RX ORDER — FLUMAZENIL 0.1 MG/ML
0.2 INJECTION INTRAVENOUS AS NEEDED
Status: DISCONTINUED | OUTPATIENT
Start: 2025-07-14 | End: 2025-07-14 | Stop reason: HOSPADM

## 2025-07-14 RX ORDER — BUPIVACAINE HYDROCHLORIDE AND EPINEPHRINE 2.5; 5 MG/ML; UG/ML
INJECTION, SOLUTION EPIDURAL; INFILTRATION; INTRACAUDAL; PERINEURAL AS NEEDED
Status: DISCONTINUED | OUTPATIENT
Start: 2025-07-14 | End: 2025-07-14 | Stop reason: HOSPADM

## 2025-07-14 RX ORDER — LEVOTHYROXINE SODIUM 25 UG/1
25 TABLET ORAL
Status: DISCONTINUED | OUTPATIENT
Start: 2025-07-15 | End: 2025-07-17 | Stop reason: HOSPADM

## 2025-07-14 RX ORDER — SODIUM CHLORIDE 0.9 % (FLUSH) 0.9 %
10 SYRINGE (ML) INJECTION EVERY 12 HOURS SCHEDULED
Status: DISCONTINUED | OUTPATIENT
Start: 2025-07-14 | End: 2025-07-14 | Stop reason: HOSPADM

## 2025-07-14 RX ORDER — MAGNESIUM HYDROXIDE 1200 MG/15ML
LIQUID ORAL AS NEEDED
Status: DISCONTINUED | OUTPATIENT
Start: 2025-07-14 | End: 2025-07-14 | Stop reason: HOSPADM

## 2025-07-14 RX ORDER — SODIUM CHLORIDE 0.9 % (FLUSH) 0.9 %
3 SYRINGE (ML) INJECTION AS NEEDED
Status: DISCONTINUED | OUTPATIENT
Start: 2025-07-14 | End: 2025-07-14 | Stop reason: HOSPADM

## 2025-07-14 RX ORDER — DEXAMETHASONE SODIUM PHOSPHATE 4 MG/ML
INJECTION, SOLUTION INTRA-ARTICULAR; INTRALESIONAL; INTRAMUSCULAR; INTRAVENOUS; SOFT TISSUE AS NEEDED
Status: DISCONTINUED | OUTPATIENT
Start: 2025-07-14 | End: 2025-07-14 | Stop reason: SURG

## 2025-07-14 RX ORDER — FENTANYL CITRATE 50 UG/ML
50 INJECTION, SOLUTION INTRAMUSCULAR; INTRAVENOUS
Status: DISCONTINUED | OUTPATIENT
Start: 2025-07-14 | End: 2025-07-14 | Stop reason: HOSPADM

## 2025-07-14 RX ORDER — ATORVASTATIN CALCIUM 40 MG/1
40 TABLET, FILM COATED ORAL DAILY
Status: DISCONTINUED | OUTPATIENT
Start: 2025-07-14 | End: 2025-07-17 | Stop reason: HOSPADM

## 2025-07-14 RX ORDER — FENTANYL CITRATE 50 UG/ML
INJECTION, SOLUTION INTRAMUSCULAR; INTRAVENOUS AS NEEDED
Status: DISCONTINUED | OUTPATIENT
Start: 2025-07-14 | End: 2025-07-14 | Stop reason: SURG

## 2025-07-14 RX ORDER — BISACODYL 5 MG/1
5 TABLET, DELAYED RELEASE ORAL DAILY PRN
Status: DISCONTINUED | OUTPATIENT
Start: 2025-07-14 | End: 2025-07-17 | Stop reason: HOSPADM

## 2025-07-14 RX ORDER — MORPHINE SULFATE 1 MG/ML
INJECTION INTRAVENOUS CONTINUOUS
Status: DISCONTINUED | OUTPATIENT
Start: 2025-07-14 | End: 2025-07-15

## 2025-07-14 RX ORDER — TIMOLOL MALEATE 5 MG/ML
1 SOLUTION/ DROPS OPHTHALMIC 2 TIMES DAILY
Status: DISCONTINUED | OUTPATIENT
Start: 2025-07-14 | End: 2025-07-17 | Stop reason: HOSPADM

## 2025-07-14 RX ORDER — NALOXONE HCL 0.4 MG/ML
0.1 VIAL (ML) INJECTION
Status: DISCONTINUED | OUTPATIENT
Start: 2025-07-14 | End: 2025-07-17 | Stop reason: HOSPADM

## 2025-07-14 RX ORDER — ROCURONIUM BROMIDE 10 MG/ML
INJECTION, SOLUTION INTRAVENOUS AS NEEDED
Status: DISCONTINUED | OUTPATIENT
Start: 2025-07-14 | End: 2025-07-14 | Stop reason: SURG

## 2025-07-14 RX ORDER — LABETALOL HYDROCHLORIDE 5 MG/ML
5 INJECTION, SOLUTION INTRAVENOUS
Status: DISCONTINUED | OUTPATIENT
Start: 2025-07-14 | End: 2025-07-14 | Stop reason: HOSPADM

## 2025-07-14 RX ORDER — NALOXONE HCL 0.4 MG/ML
0.04 VIAL (ML) INJECTION AS NEEDED
Status: DISCONTINUED | OUTPATIENT
Start: 2025-07-14 | End: 2025-07-14 | Stop reason: HOSPADM

## 2025-07-14 RX ORDER — FENTANYL CITRATE 50 UG/ML
25 INJECTION, SOLUTION INTRAMUSCULAR; INTRAVENOUS
Status: DISCONTINUED | OUTPATIENT
Start: 2025-07-14 | End: 2025-07-14 | Stop reason: HOSPADM

## 2025-07-14 RX ORDER — LIDOCAINE HYDROCHLORIDE 40 MG/ML
SOLUTION TOPICAL AS NEEDED
Status: DISCONTINUED | OUTPATIENT
Start: 2025-07-14 | End: 2025-07-14 | Stop reason: SURG

## 2025-07-14 RX ORDER — KETAMINE HCL IN NACL, ISO-OSM 100MG/10ML
SYRINGE (ML) INJECTION AS NEEDED
Status: DISCONTINUED | OUTPATIENT
Start: 2025-07-14 | End: 2025-07-14 | Stop reason: SURG

## 2025-07-14 RX ORDER — HYDROCHLOROTHIAZIDE 25 MG/1
12.5 TABLET ORAL
Status: DISCONTINUED | OUTPATIENT
Start: 2025-07-14 | End: 2025-07-17 | Stop reason: HOSPADM

## 2025-07-14 RX ORDER — POLYETHYLENE GLYCOL 3350 17 G/17G
17 POWDER, FOR SOLUTION ORAL DAILY
Status: DISCONTINUED | OUTPATIENT
Start: 2025-07-14 | End: 2025-07-17 | Stop reason: HOSPADM

## 2025-07-14 RX ORDER — IBUPROFEN 600 MG/1
600 TABLET, FILM COATED ORAL EVERY 6 HOURS PRN
Status: DISCONTINUED | OUTPATIENT
Start: 2025-07-14 | End: 2025-07-14 | Stop reason: HOSPADM

## 2025-07-14 RX ORDER — SODIUM CHLORIDE 9 MG/ML
40 INJECTION, SOLUTION INTRAVENOUS AS NEEDED
Status: DISCONTINUED | OUTPATIENT
Start: 2025-07-14 | End: 2025-07-17 | Stop reason: HOSPADM

## 2025-07-14 RX ADMIN — HYDROMORPHONE HYDROCHLORIDE 0.5 MG: 1 INJECTION, SOLUTION INTRAMUSCULAR; INTRAVENOUS; SUBCUTANEOUS at 11:06

## 2025-07-14 RX ADMIN — GABAPENTIN 600 MG: 300 CAPSULE ORAL at 20:23

## 2025-07-14 RX ADMIN — PROPOFOL 110 MG: 10 INJECTION, EMULSION INTRAVENOUS at 07:20

## 2025-07-14 RX ADMIN — GLYCOPYRROLATE 0.1 MG: 0.2 INJECTION INTRAMUSCULAR; INTRAVENOUS at 07:24

## 2025-07-14 RX ADMIN — TIMOLOL MALEATE 1 DROP: 5 SOLUTION/ DROPS OPHTHALMIC at 20:26

## 2025-07-14 RX ADMIN — OXYCODONE AND ACETAMINOPHEN 1 TABLET: 325; 10 TABLET ORAL at 11:52

## 2025-07-14 RX ADMIN — LIDOCAINE HYDROCHLORIDE 1 EACH: 40 SOLUTION TOPICAL at 07:21

## 2025-07-14 RX ADMIN — GABAPENTIN 600 MG: 300 CAPSULE ORAL at 13:47

## 2025-07-14 RX ADMIN — EPHEDRINE SULFATE 10 MG: 50 INJECTION INTRAVENOUS at 07:50

## 2025-07-14 RX ADMIN — Medication 3 ML: at 13:16

## 2025-07-14 RX ADMIN — CEFAZOLIN 2000 MG: 2 INJECTION, POWDER, FOR SOLUTION INTRAMUSCULAR; INTRAVENOUS at 23:33

## 2025-07-14 RX ADMIN — LIDOCAINE HYDROCHLORIDE 100 MG: 20 INJECTION, SOLUTION EPIDURAL; INFILTRATION; INTRACAUDAL; PERINEURAL at 10:29

## 2025-07-14 RX ADMIN — GLYCOPYRROLATE 0.1 MG: 0.2 INJECTION INTRAMUSCULAR; INTRAVENOUS at 07:49

## 2025-07-14 RX ADMIN — FENTANYL CITRATE 50 MCG: 50 INJECTION, SOLUTION INTRAMUSCULAR; INTRAVENOUS at 07:58

## 2025-07-14 RX ADMIN — AMLODIPINE BESYLATE 10 MG: 10 TABLET ORAL at 13:47

## 2025-07-14 RX ADMIN — CEFAZOLIN 2000 MG: 2 INJECTION, POWDER, FOR SOLUTION INTRAMUSCULAR; INTRAVENOUS at 07:35

## 2025-07-14 RX ADMIN — ONDANSETRON 4 MG: 2 INJECTION, SOLUTION INTRAMUSCULAR; INTRAVENOUS at 19:44

## 2025-07-14 RX ADMIN — SODIUM CHLORIDE, POTASSIUM CHLORIDE, SODIUM LACTATE AND CALCIUM CHLORIDE 1000 ML: 600; 310; 30; 20 INJECTION, SOLUTION INTRAVENOUS at 06:04

## 2025-07-14 RX ADMIN — Medication: at 13:06

## 2025-07-14 RX ADMIN — ONDANSETRON 4 MG: 2 INJECTION INTRAMUSCULAR; INTRAVENOUS at 10:03

## 2025-07-14 RX ADMIN — Medication 30 MG: at 07:51

## 2025-07-14 RX ADMIN — FENTANYL CITRATE 100 MCG: 50 INJECTION, SOLUTION INTRAMUSCULAR; INTRAVENOUS at 07:51

## 2025-07-14 RX ADMIN — FENTANYL CITRATE 50 MCG: 50 INJECTION, SOLUTION INTRAMUSCULAR; INTRAVENOUS at 11:06

## 2025-07-14 RX ADMIN — ROCURONIUM BROMIDE 5 MG: 10 INJECTION, SOLUTION INTRAVENOUS at 07:20

## 2025-07-14 RX ADMIN — EPHEDRINE SULFATE 10 MG: 50 INJECTION INTRAVENOUS at 09:54

## 2025-07-14 RX ADMIN — LIDOCAINE HYDROCHLORIDE 100 MG: 20 INJECTION, SOLUTION EPIDURAL; INFILTRATION; INTRACAUDAL; PERINEURAL at 07:20

## 2025-07-14 RX ADMIN — DEXAMETHASONE SODIUM PHOSPHATE 4 MG: 4 INJECTION, SOLUTION INTRA-ARTICULAR; INTRALESIONAL; INTRAMUSCULAR; INTRAVENOUS; SOFT TISSUE at 08:58

## 2025-07-14 RX ADMIN — CEFAZOLIN 2000 MG: 2 INJECTION, POWDER, FOR SOLUTION INTRAMUSCULAR; INTRAVENOUS at 15:13

## 2025-07-14 RX ADMIN — EPHEDRINE SULFATE 12.5 MG: 50 INJECTION INTRAVENOUS at 07:37

## 2025-07-14 RX ADMIN — ATORVASTATIN CALCIUM 40 MG: 40 TABLET, FILM COATED ORAL at 13:47

## 2025-07-14 RX ADMIN — PROPOFOL 50 MCG/KG/MIN: 10 INJECTION, EMULSION INTRAVENOUS at 07:57

## 2025-07-14 RX ADMIN — HYDROCHLOROTHIAZIDE 12.5 MG: 25 TABLET ORAL at 13:47

## 2025-07-14 RX ADMIN — LATANOPROST 1 DROP: 50 SOLUTION OPHTHALMIC at 20:23

## 2025-07-14 RX ADMIN — EPHEDRINE SULFATE 12.5 MG: 50 INJECTION INTRAVENOUS at 07:23

## 2025-07-14 RX ADMIN — SENNOSIDES, DOCUSATE SODIUM 2 TABLET: 50; 8.6 TABLET, FILM COATED ORAL at 20:26

## 2025-07-14 RX ADMIN — PROPOFOL 20 MG: 10 INJECTION, EMULSION INTRAVENOUS at 10:24

## 2025-07-14 RX ADMIN — HYDROMORPHONE HYDROCHLORIDE 0.5 MG: 1 INJECTION, SOLUTION INTRAMUSCULAR; INTRAVENOUS; SUBCUTANEOUS at 11:16

## 2025-07-14 RX ADMIN — Medication 20 MG: at 08:06

## 2025-07-14 RX ADMIN — FENTANYL CITRATE 100 MCG: 50 INJECTION, SOLUTION INTRAMUSCULAR; INTRAVENOUS at 07:20

## 2025-07-14 RX ADMIN — CYCLOBENZAPRINE HYDROCHLORIDE 10 MG: 10 TABLET, FILM COATED ORAL at 20:32

## 2025-07-14 RX ADMIN — Medication 140 MG: at 07:20

## 2025-07-14 RX ADMIN — SENNOSIDES, DOCUSATE SODIUM 2 TABLET: 50; 8.6 TABLET, FILM COATED ORAL at 13:47

## 2025-07-14 RX ADMIN — PROPOFOL 20 MG: 10 INJECTION, EMULSION INTRAVENOUS at 10:26

## 2025-07-14 RX ADMIN — FENTANYL CITRATE 100 MCG: 50 INJECTION, SOLUTION INTRAMUSCULAR; INTRAVENOUS at 09:47

## 2025-07-14 RX ADMIN — SODIUM CHLORIDE 75 ML/HR: 9 INJECTION, SOLUTION INTRAVENOUS at 12:50

## 2025-07-14 RX ADMIN — HYDROMORPHONE HYDROCHLORIDE 1 MG: 1 INJECTION, SOLUTION INTRAMUSCULAR; INTRAVENOUS; SUBCUTANEOUS at 09:09

## 2025-07-14 RX ADMIN — PROPOFOL 20 MG: 10 INJECTION, EMULSION INTRAVENOUS at 10:28

## 2025-07-14 RX ADMIN — LOSARTAN POTASSIUM 100 MG: 50 TABLET, FILM COATED ORAL at 13:47

## 2025-07-14 NOTE — ANESTHESIA POSTPROCEDURE EVALUATION
"Patient: Jaime Mckeon    Procedure Summary       Date: 07/14/25 Room / Location:  [facility]  /  [facility]    Anesthesia Start: 0717 Anesthesia Stop: 1045    Procedure: LUMBAR LEFT DISCECTOMY, HEMILAMINECTOMY, FACETECTOMY L23,34,45 TRANSFORAMINAL INTERBODY FUSION WITH NEURO ROBOT (Left: Spine Lumbar) Diagnosis:       Lumbar stenosis with neurogenic claudication      Degeneration of intervertebral disc of lumbar region with discogenic back pain and lower extremity pain      (Lumbar stenosis with neurogenic claudication [M48.062])      (Degeneration of intervertebral disc of lumbar region with discogenic back pain and lower extremity pain [M51.362])    Surgeons: Corby Edwards MD Provider: Annette Castaneda CRNA    Anesthesia Type: general ASA Status: 3            Anesthesia Type: general    Vitals  Vitals Value Taken Time   /72 07/14/25 12:17   Temp 97.2 °F (36.2 °C) 07/14/25 10:43   Pulse 77 07/14/25 12:23   Resp 9 07/14/25 12:15   SpO2 97 % 07/14/25 12:23   Vitals shown include unfiled device data.        Post Anesthesia Care and Evaluation    Patient location during evaluation: PACU  Patient participation: complete - patient participated  Level of consciousness: awake and awake and alert  Pain score: 0  Pain management: adequate    Airway patency: patent  Anesthetic complications: No anesthetic complications  PONV Status: none  Cardiovascular status: acceptable  Respiratory status: acceptable  Hydration status: acceptable    Comments: Patient discharged according to acceptable Jennifer score per RN assessment. See nursing records for further information.     Blood pressure 128/72, pulse 67, temperature 97.2 °F (36.2 °C), temperature source Temporal, resp. rate 9, height 181 cm (71.26\"), weight 107 kg (235 lb 3.7 oz), SpO2 93%.      "

## 2025-07-14 NOTE — PLAN OF CARE
"Goal Outcome Evaluation:  Plan of Care Reviewed With: patient, spouse        Progress: improving  Outcome Evaluation: OT evaluation completed. Pt is A&O x4, family present including spouse. Pt s/p TLIF L2-5. At baseline, pt was independent including driving, working, etc. Today, pt presents fowlers in bed, 5/10 pain through lower back and down LLE. Pt educated on spinal precautions and logrolling to come EOB. Pt completes logrolling technique w/ CGA and VCs. Pt sits EOB for LSO education including how to don/doff and when to wear. Dependent to don today. Pt completes sit<>stand w/ CGa and extra time, extra time to complete t/f to chair as he refuses HHA at this time due to \"wanting to do himself.\" Pt positioned in chair for comfort. BUe ROM WFL, no strength deficits noted. Pt educated on moving, changing positions hourly but to call out for assistance while here. OT to cont to see pt for deficits adn to return to PLOF. Recommend d/c home with 24/7 care once medically stable. Pt left in recliner reclined w/ call light and family,    Anticipated Discharge Disposition (OT): home with 24/7 care                        "

## 2025-07-14 NOTE — OP NOTE
Procedure Note    Pre-op Diagnosis: Lumbar stenosis with neurogenic claudication [M48.062]  Degeneration of intervertebral disc of lumbar region with discogenic back pain and lower extremity pain [M51.362]    Post-Op Diagnosis: Post-Op Diagnosis Codes:     * Lumbar stenosis with neurogenic claudication [M48.062]     * Degeneration of intervertebral disc of lumbar region with discogenic back pain and lower extremity pain [M51.362]     Procedure Name: L2-3, 3 4, 4 5 discectomy  L2-3, 3 4, 4 5 interbody fusion with peek allograft and allograft  L2-3, 3 4, 4 5 left hemilaminectomy, facetectomy, foraminotomy  L2, 3, 4, 5 posterior pedicle screw instrumentation  Use of image-guided stereotactic navigation  Use of the surgical robot    Spinal Surgery Levels Completed:3 Levels    Indications:  A MRI revealed findings of Lumbar Degenerative Disc Disease. The patient now presents for a  L2-3, L3-4, and L4-5 decompression and fusion after discussing therapeutic alternatives.          Surgeon: Corby Edwards MD     Assistants: none    Anesthesia: General endotracheal anesthesia    ASA Class: 3    Procedure Details   After obtaining informed consent, having the risks and benefits of the procedure explained including but not limited to infection, bleeding, paralysis, spinal fluid leak, bowel or bladder incontinence, stroke, coma, and death, the patient was brought to the operating room.  The patient was given general anesthesia via an endotracheal tube.  The patient was flipped prone on a Vini axis table.  The lumbar spine was then prepped and draped in a standard sterile fashion.  The posterior superior iliac spine on the right was palpated and identified.  A preplanned incision was infiltrated with Marcaine and epinephrine.  A 10 blade scalpel was used to make an incision at the dermis and epidermis.  Bovie cautery was used to extend the incision down to the level of the periosteum at the posterior superior iliac spine  on the right.  A pin was then inserted using a pin .  The surgical robot was then anchored to the iliac spine pin.  The surgical robot was then registered and calibrated.  Preoperative CT scan and fluoroscopic images were then aligned and registered.  In accordance with the predesign plan the robot arm was then sent to the left at L2, L3, L4, and L5.  A 20 blade scalpel was used to make an incision through the lumbosacral fascia along this trajectory.  A tissue protector and dilator were then inserted through the robot arm to the entry point at L2, L3, L4, and L5 .  A drill was then inserted through the robot arm along this trajectory and a channel was drilled through the pedicle at L2, L3, L4, and L5   on the left .  A tap was then inserted through the robot arm along its trajectory and each pedicle was then tapped.  A series of image-guided screws were then placed through the robot arm along the trajectory at L2, L3, L4, and L5 through the pedicle into the vertebral body on the left .  The robot arm was then sent to the right at L2, L3, L4, and L5 along the preplanned trajectory. A 20 blade scalpel was used to make an incision through the dermis and epidermis along this trajectory.  A tissue protector and dilator were then inserted through the robot arm to the entry point at L2, L3, L4, and L5 .  A drill was then inserted through the robot arm along this trajectory and a channel was drilled through the pedicle at L2, L3, L4, and L5 .  A tap was then inserted through the robot arm along its trajectory and each pedicle was then tapped.  A series of image-guided screws were then placed through the robot arm along the trajectory at L2, L3, L4, and L5 through the pedicle into the vertebral body on the right .   The retractor system was then deployed over the screw extenders on the left at L2 and L3.  An image-guided drill was then used to drill hemilaminectomy and facetectomy on the left.  The hemilaminectomy and  facetectomy were completed using a series of 2 mm and 3 mm Kerrisons.  Neurophysiologic monitoring was used to identify the disc space on the left at L2-3.  The annulus of the disc base was then incised using a bayoneted 15 blade scalpel.  The disc space was then developed and the discectomy was conducted using a series of 7 mm and 8 mm navigated disc cody, up-biting curettes, pituitary rongeurs.  Once the discectomy was completed a  9 mm x  27  mm PEEK expandable cage filled with autograft and allograft was tapped into place under image-guided navigation.  The interbody device was then expanded.  The remainder of the laminectomy and foraminotomies were completed using a series of Leksell rongeurs, 2 mm Kerrisons, 3 mm Kerrisons.  The retractor system was then deployed over the screw extenders on the left at L3 and L4.  An image-guided drill was then used to drill hemilaminectomy and facetectomy on the left.  The hemilaminectomy and facetectomy were completed using a series of 2 mm and 3 mm Kerrisons.  Neurophysiologic monitoring was used to identify the disc space on the left at L3-4.  The annulus of the disc base was then incised using a bayoneted 15 blade scalpel.  The disc space was then developed and the discectomy was conducted using a series of 7 mm and 8 mm navigated disc cody, up-biting curettes, pituitary rongeurs.  Once the discectomy was completed a  7 mm x  27  mm PEEK expandable cage filled with autograft and allograft was tapped into place under image-guided navigation.  The interbody device was then expanded.  The remainder of the laminectomy and foraminotomies were completed using a series of Leksell rongeurs, 2 mm Kerrisons, 3 mm Kerrisons. The retractor system was then deployed over the screw extenders on the left at L4 and L5.  An image-guided drill was then used to drill hemilaminectomy and facetectomy on the left.  The hemilaminectomy and facetectomy were completed using a series of 2 mm  and 3 mm Kerrisons.  Neurophysiologic monitoring was used to identify the disc space on the left at L4-5.  The annulus of the disc base was then incised using a bayoneted 15 blade scalpel.  The disc space was then developed and the discectomy was conducted using a series of 7 mm and 8 mm navigated disc cody, up-biting curettes, pituitary rongeurs.  Once the discectomy was completed a  9 mm x  27  mm PEEK expandable cage filled with autograft and allograft was tapped into place under image-guided navigation.  The interbody device was then expanded.  The remainder of the laminectomy and foraminotomies were completed using a series of Leksell rongeurs, 2 mm Kerrisons, 3 mm Kerrisons. Two rods were then measured on the left and the right.  2 custom fabricated rods were inserted into the heads of the screws on the left and the right.  The rods were then anchored in place and reduced using set screws.   Final AP and lateral fluoroscopy showed good positioning of all interbody devices and hardware.  The set screws were final tightened and broken off.  The wounds were then copiously irrigated with antibiotic solution.  They were inspected for hemostasis.   The deep tissues were closed using a series of inverted interrupted 0 Vicryl sutures.  The subcutaneous and soft tissues were closed using a series of inverted 2-0 Vicryl sutures.  And the skin was closed using a running 4-0 Monocryl.  All sponge, needle and instrument counts were correct at the end of the procedure.  The patient was extubated in stable condition and returned to the recovery room with about 80 mL of blood loss.       Findings:  Lumbar Degenerative Disc Disease    Estimated Blood Loss:  80           Drains: None           Total IV Fluids:  mL           Specimens:   Specimens       ID Source Type Tests Collected By Collected At [facility]?    A Spine, Lumbar Disc TISSUE PATHOLOGY EXAM   Corby Edwards MD 7/14/25 0956 No    Description: L2-5                    Implants:   Implant Name Type Inv. Item Serial No.  Lot No. LRB No. Used Action   HEMOST ABS SURGIFOAM  8X12 10MM - [id number] Implant HEMOST ABS SURGIFOAM  8X12 10MM  [employer]  DIV OF  AND J [id number] Left 1 Implanted   KT HEMOST ABS SURGIFOAM PORCN 1GRAM - [id number] Implant KT HEMOST ABS SURGIFOAM PORCN 1GRAM  [employer]  DIV OF  AND [id number] Left 1 Implanted   PUTTY DBM TAMICA 5CC - [id number] Implant PUTTY DBM TAMICA 5CC  [employer] [id number] Left 1 Implanted   SPACR IB LIF CATALYFT/PL EXP TI LNG 9MM - [id number] Implant SPACR IB LIF CATALYFT/PL EXP TI LNG 9MM  [employer] [id number] Left 1 Implanted   SPACR IB LIF CATALYFT/PL EXP TI LNG [id number] - [id number] Implant SPACR IB LIF CATALYFT/PL EXP TI LNG 27.5P31UDH6QK53.8MM  [employer] [id number] Left 1 Implanted   SPACR IB LIF CATALYFT/PL EXP TI LNG 27.4B04MZB0EP89.8MM - [id number] Implant SPACR IB LIF CATALYFT/PL EXP TI LNG 27.1Q49NQP8WE13.8MM  [employer] [id number] Left 1 Implanted   SCRW DAVI SOLERA VOYAGER MAS 7.5X35MM - [id number] Implant SCRW DAVI SOLERA VOYAGER MAS 7.5X35MM  [employer]  Left 2 Implanted   SCRW DAVI SOLERA VOYAGER MAS 7.5X45MM - [id number] Implant SCRW DAVI SOLERA VOYAGER MAS 7.5X45MM  [employer]  Left 6 Implanted   CP BUNDLE UNID EXP YANICK 1TO3/LVL NATL CUST - [id number] Implant CP BUNDLE UNID EXP YANICK 1TO3/LVL NATL CUST  [employer]  Left 1 Implanted   YANICK SPINE CUST PERC W/PLAN/GUID 1TO3/LVL COCR 4.75MM - [id number] Implant YANICK SPINE CUST PERC W/PLAN/GUID 1TO3/LVL COCR 4.75MM  [employer] [id number] Left 2 Implanted   SCRW ST SOLERA VOYAGER BRKOFF TI 4.75MM - [id number] Implant SCRW [facility] PATEL TI 4.75MM  [employer]  Left 8 Implanted              Complications:  none           Disposition: PACU - hemodynamically stable.           Condition: stable        Corby Edwards MD

## 2025-07-14 NOTE — H&P
SUBJECTIVE:  Patient ID: Jaime Mckeon is a 75 y.o. male is here today for follow-up.     Chief Complaint: Back pain       Chief Complaint   Patient presents with    SURGICAL DISCUSSION       Patient continues to have back & BLE pain and LLE weakness.  [facility] without relief of his symptoms.  He has worked with [facility], had injections, but isn't getting relief from the injections.  [facility] imaging         HPI  75-year-old gentleman that we have been following for back pain and leg pain. He is about 50% back pain and 50% bilateral lower extremity radicular pain.  The pain in the left leg tends to be worse than the pain in the right.  He also describes a subjective weakness in the left leg at times. The symptoms are claudicant in nature. He did a dedicated course of physical therapy last year with limited benefit. He is also worked with a chiropractor.   He has been working with [facility] and injection treatments recently.  He is only gotten minimal relief from the most recent injections.  He is also on gabapentin 603 times a day which he says is helpful.  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     The following portions of the patient's history were reviewed and updated as appropriate: allergies, current medications, past family history, past medical history, past social history, past surgical history and problem list.     OBJECTIVE:     Review of Systems   Musculoskeletal:  Positive for back pain.   All other systems reviewed and are negative.           Physical Exam  Constitutional:       General: He is awake.   Eyes:      Extraocular Movements: Extraocular movements intact.      Pupils: Pupils are equal, round, and reactive to light.   Neurological:      Motor: Motor strength is normal.     Deep Tendon Reflexes: Reflexes are normal and symmetric.   Psychiatric:         Speech: Speech normal.            Neurological Exam  Mental Status  Awake. Oriented to person, place  and time. Speech is normal. Language is fluent with no aphasia. Attention and concentration are normal.     Cranial Nerves  CN I: Sense of smell is normal.  CN II: Right normal visual field. Left normal visual field.  CN III, IV, VI: Extraocular movements intact bilaterally. Pupils equal round and reactive to light bilaterally.  CN V: Facial sensation is normal.  CN VII:  Right: There is no facial weakness.  Left: There is no facial weakness.  CN XI: Shoulder shrug strength is normal.  CN XII: Tongue midline without atrophy or fasciculations.     Motor  Normal muscle bulk throughout. Normal muscle tone. Strength is 5/5 throughout all four extremities.     Sensory  Sensation is intact to light touch, pinprick, vibration and proprioception in all four extremities.     Reflexes  Deep tendon reflexes are 2+ and symmetric in all four extremities.     Gait  Normal casual, toe, heel and tandem gait.        Independent Review of Radiographic Studies:        ASSESSMENT/PLAN:  The patient complains of back pain and bilateral lower extremity pain.  He is gone through an extensive course of nonsurgical care including physical therapy, pain management injections and medical management.  At this point I would recommend a left L2-3, 3 4, 4 5 transforaminal lumbar interbody fusion to treat this issue.  The risk and benefits were discussed at length which included but were not limited to infection, bleeding, paralysis, spinal fluid leak, bowel bladder incontinence, stroke, coma, and death.  The patient acknowledged understand this.  His questions and concerns were addressed.        1. Lumbar stenosis with neurogenic claudication    2. Degeneration of intervertebral disc of lumbar region with discogenic back pain and lower extremity pain    3. Nonsmoker    4. Class 1 obesity due to excess calories with serious comorbidity and body mass index (BMI) of 30.0 to 30.9 in adult          The patient's Body mass index is 30.87 kg/m².. BMI is  above normal parameters. Recommendations include: educational material     Return for POSTOPERATIVELY.        Corby Edwards MD

## 2025-07-14 NOTE — ANESTHESIA PREPROCEDURE EVALUATION
Anesthesia Evaluation     Patient summary reviewed   no history of anesthetic complications:   NPO Solid Status: > 8 hours             Airway   Mallampati: II  Dental      Pulmonary    (-) COPD, asthma, sleep apnea, not a smoker  Cardiovascular   Exercise tolerance: good (4-7 METS)    (+) hyperlipidemia  (-) pacemaker, past MI, angina, cardiac stents      Neuro/Psych  (+) CVA  (-) seizures, TIA  GI/Hepatic/Renal/Endo    (+) obesity, thyroid problem hypothyroidism  (-) GERD, liver disease, no renal disease, diabetes    Musculoskeletal     (+) back pain  Abdominal    Substance History      OB/GYN          Other                    Anesthesia Plan    ASA 3     general     intravenous induction     Anesthetic plan, risks, benefits, and alternatives have been provided, discussed and informed consent has been obtained with: patient.    CODE STATUS:

## 2025-07-14 NOTE — THERAPY EVALUATION
Patient Name: Jaime Mckeon  : 1950    MRN: [id number]                              Today's Date: 2025       Admit Date: 2025    Visit Dx:     ICD-10-CM ICD-9-CM   1. Lumbar stenosis with neurogenic claudication  M48.062 724.03   2. Degeneration of intervertebral disc of lumbar region with discogenic back pain and lower extremity pain  M51.362 722.52     Patient Active Problem List   Diagnosis    Encounter for screening for malignant neoplasm of colon    Stroke    Cerebrovascular accident (CVA), unspecified mechanism    Essential hypertension    Hx of adenomatous colonic polyps    Lumbar stenosis with neurogenic claudication    Degeneration of intervertebral disc of lumbar region with discogenic back pain and lower extremity pain    Lumbar degenerative disc disease     Past Medical History:   Diagnosis Date    Allergic rhinitis     Arthritis     Back pain 10/2024    BPH (benign prostatic hyperplasia)     Claustrophobia     Hemorrhoid     History of cardioembolic cerebrovascular accident (CVA)     History of transfusion     Has had a blood transfusion, in     Hyperlipidemia     Hypertension     Hypothyroidism     Low back pain     Lumbosacral disc disease     Macular degeneration, 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 parekh-One 2 mm adenomatous polyp in the cecum, One 3 mm fragments of sessile serrated adenoma polyp at the hepatic flexure at 80 cm proximal to the anus,  One 6 mm adenomatous polyp at 80 cm proximal to the anus,  One 5 mm hyperplastic  polyp at 40 cm proximal to the anus - Diverticulosis in the sigmoid colon.    COLONOSCOPY N/A 2025    Procedure: COLONOSCOPY WITH ANESTHESIA;  Surgeon: Elan Parekh MD;  Location: [facility] ENDOSCOPY;  Service:  Gastroenterology;  Laterality: N/A;  pre polyp hx  post diverticulosis      FINGER SURGERY      CYST REMOVAL X3    VASECTOMY Bilateral       General Information       Row Name 07/14/25 1345          OT Time and Intention    Subjective Information complains of;pain  -KP     Document Type evaluation  -     Mode of Treatment occupational therapy  -     Patient Effort good  -KP     Symptoms Noted During/After Treatment increased pain  -       Row Name 07/14/25 1345          General Information    Patient Profile Reviewed yes  -     Prior Level of Function independent:;all household mobility;transfer;ADL's;driving;community mobility;work;home management  -     Existing Precautions/Restrictions fall;brace worn when out of bed;spinal  -KP     Barriers to Rehab medically complex  -KP       Row Name 07/14/25 1345          Living Environment    Current Living Arrangements home  -     People in Home spouse  -KP       Row Name 07/14/25 1345          Home Main Entrance    Number of Stairs, Main Entrance one  -KP     Stair Railings, Main Entrance none  -KP       Row Name 07/14/25 1345          Stairs Within Home, Primary    Number of Stairs, Within Home, Primary none  -KP       Row Name 07/14/25 1345          Cognition    Orientation Status (Cognition) oriented x 4  -KP       Row Name 07/14/25 1343          Safety Issues/Impairments Affecting Functional Mobility    Impairments Affecting Function (Mobility) balance;pain  -KP               User Key  (r) = Recorded By, (t) = Taken By, (c) = Cosigned By      Initials Name Provider Type    Joselyn Castrejon, OTR/L Occupational Therapist                     Mobility/ADL's       Row Name 07/14/25 1342          Bed Mobility    Bed Mobility rolling right;sidelying-sit  -     Rolling Right [place] (Bed Mobility) standby assist;verbal cues;nonverbal cues (demo/gesture)  -     Sidelying-Sit [place] (Bed Mobility) contact guard;verbal cues  -      Assistive Device (Bed Mobility) bed rails  -[facility] Name 07/14/25 1345          Transfers    Transfers sit-stand transfer;stand-sit transfer;bed-chair transfer  -[facility] Name 07/14/25 1345          Bed-Chair Transfer    Bed-Chair [place] (Transfers) contact guard;verbal cues  -     Comment, (Bed-Chair Transfer) refused HHA, wanted to complete on his own, took many very small steps to complete task, required extra time- may benefit from walker  -[facility] Name 07/14/25 1345          Sit-Stand Transfer    Sit-Stand [place] (Transfers) verbal cues;contact guard  -[facility] Name 07/14/25 1345          Stand-Sit Transfer    Stand-Sit [place] (Transfers) verbal cues;contact guard  -[facility] Name 07/14/25 1345          Functional Mobility    Functional Mobility- Ind. Level contact guard assist;verbal cues required  -     Functional Mobility-Distance (Feet) 5  -     Patient was able to Ambulate yes  -KP       Row Name 07/14/25 1345          Activities of Daily Living    BADL Assessment/Intervention upper body dressing  -KP       Row Name 07/14/25 1345          Upper Body Dressing Assessment/Training    Position (Upper Body Dressing) edge of bed sitting  -     Comment, (Upper Body Dressing) LSO education provided to pt/spouse including donning/doffing and wearing schedule  -               User Key  (r) = Recorded By, (t) = Taken By, (c) = Cosigned By      Initials Name Provider Type     Joselyn Romero, OTR/L Occupational Therapist                   Obj/Interventions       [facility] Name 07/14/25 1345          Sensory Assessment (Somatosensory)    Sensory Assessment (Somatosensory) UE sensation intact  -KP       Row Name 07/14/25 1345          Vision Assessment/Intervention    Visual Impairment/Limitations WNL  -[facility] Name 07/14/25 1345          Range of Motion Comprehensive    General Range of Motion no range of motion deficits identified  -[facility] Name 07/14/25 1345           Strength Comprehensive (MMT)    General Manual Muscle Testing (MMT) Assessment no strength deficits identified  -       Row Name 07/14/25 1345          Balance    Balance Assessment sitting static balance;sitting dynamic balance;standing static balance;standing dynamic balance  -KP     Static Sitting Balance standby assist  -KP     Dynamic Sitting Balance standby assist  -KP     Position, Sitting Balance unsupported;sitting edge of bed  -KP     Static Standing Balance contact guard  -KP     Dynamic Standing Balance contact guard  -KP     Position/Device Used, Standing Balance unsupported  -KP               User Key  (r) = Recorded By, (t) = Taken By, (c) = Cosigned By      Initials Name Provider Type    KP Joselyn Romero, OTR/L Occupational Therapist                   Goals/Plan       [facility] Name 07/14/25 1345          Transfer Goal 1 (OT)    Activity/Assistive Device (Transfer Goal 1, OT) toilet  -KP     [place] Level/Cues Needed (Transfer Goal 1, OT) standby assist  -KP     Time Frame (Transfer Goal 1, OT) long term goal (LTG);10 days  -KP     Progress/Outcome (Transfer Goal 1, OT) new goal  -[facility] Name 07/14/25 1345          Dressing Goal 1 (OT)    Activity/Device (Dressing Goal 1, OT) dressing skills, all  -KP     [place]/Cues Needed (Dressing Goal 1, OT) minimum assist (75% or more patient effort)  -KP     Time Frame (Dressing Goal 1, OT) long term goal (LTG);10 days  -KP     Progress/Outcome (Dressing Goal 1, OT) new goal  -[facility] Name 07/14/25 1345          Toileting Goal 1 (OT)    Activity/Device (Toileting Goal 1, OT) toileting skills, all  -KP     [place] Level/Cues Needed (Toileting Goal 1, OT) standby assist  -KP     Time Frame (Toileting Goal 1, OT) long term goal (LTG);10 days  -KP     Progress/Outcome (Toileting Goal 1, OT) new goal  -[facility] Name 07/14/25 1345          Problem Specific Goal 1 (OT)    Problem Specific Goal 1 (OT) Pt will independently implement one  pain management technique to decrease pain and improve functional adl performance.  -     Time Frame (Problem Specific Goal 1, OT) long term goal (LTG);by discharge  -     Progress/Outcome (Problem Specific Goal 1, OT) new goal  -       Row Name 07/14/25 3075          Therapy Assessment/Plan (OT)    Planned Therapy Interventions (OT) transfer/mobility retraining;strengthening exercise;patient/caregiver education/training;occupation/activity based interventions;functional balance retraining;activity tolerance training;BADL retraining  -               User Key  (r) = Recorded By, (t) = Taken By, (c) = Cosigned By      Initials Name Provider Type    Joselyn Castrejon, OTR/L Occupational Therapist                   Clinical Impression       Row Name 07/14/25 1347          Pain Assessment    Pretreatment Pain Rating 5/10  -KP     Posttreatment Pain Rating 4/10  -     Pain Location back;extremity  -     Pain Side/Orientation lower;left  -     Pain Management Interventions activity modification encouraged;exercise or physical activity utilized;positioning techniques utilized  -     Response to Pain Interventions activity participation with decreased pain  -     Pre/Posttreatment Pain Comment intra-pain rating ~7/10  -       Row Name 07/14/25 8548          Plan of Care Review    Plan of Care Reviewed With patient;spouse  -     Progress improving  -     Outcome Evaluation OT evaluation completed. Pt is A&O x4, family present including spouse. Pt s/p TLIF L2-5. At baseline, pt was independent including driving, working, etc. Today, pt presents fowlers in bed, 5/10 pain through lower back and down LLE. Pt educated on spinal precautions and logrolling to come EOB. Pt completes logrolling technique w/ CGA and VCs. Pt sits EOB for LSO education including how to don/doff and when to wear. Dependent to don today. Pt completes sit<>stand w/ CGa and extra time, extra time to complete t/f to chair as he refuses  "HHA at this time due to \"wanting to do himself.\" Pt positioned in chair for comfort. BUe ROM WFL, no strength deficits noted. Pt educated on moving, changing positions hourly but to call out for assistance while here. OT to cont to see pt for deficits adn to return to OF. Recommend d/c home with 24/7 care once medically stable. Pt left in recliner reclined w/ call light and family,  -       Row Name 07/14/25 8599          Therapy Assessment/Plan (OT)    Rehab Potential (OT) good  -     Criteria for Skilled Therapeutic Interventions Met (OT) yes;meets criteria  -     Therapy Frequency (OT) 5 times/wk  -     Predicted Duration of Therapy Intervention (OT) until hosp d/c  -       Row Name 07/14/25 0077          Therapy Plan Review/Discharge Plan (OT)    Anticipated Discharge Disposition (OT) home with 24/7 care  -       Row Name 07/14/25 4238          Positioning and Restraints    Pre-Treatment Position in bed  -KP     Post Treatment Position chair  -KP     In Chair notified nsg;reclined;call light within reach;encouraged to call for assist;with family/caregiver  -               User Key  (r) = Recorded By, (t) = Taken By, (c) = Cosigned By      Initials Name Provider Type    Joselyn Castrejon, OTR/L Occupational Therapist                   Outcome Measures       Row Name 07/14/25 8271          How much help from another is currently needed...    Putting on and taking off regular lower body clothing? 2  -KP     Bathing (including washing, rinsing, and drying) 2  -KP     Toileting (which includes using toilet bed pan or urinal) 3  -KP     Putting on and taking off regular upper body clothing 3  -KP     Taking care of personal grooming (such as brushing teeth) 4  -KP     Eating meals 4  -KP     AM-PAC 6 Clicks Score (OT) 18  -       Row Name 07/14/25 1236          How much help from another person do you currently need...    Turning from your back to your side while in flat bed without using bedrails? 3 "  -LW     Moving from lying on back to sitting on the side of a flat bed without bedrails? 3  -LW     Moving to and from a bed to a chair (including a wheelchair)? 3  -LW     Standing up from a chair using your arms (e.g., wheelchair, bedside chair)? 3  -LW     Climbing 3-5 steps with a railing? 3  -LW     To walk in hospital room? 3  -LW     AM-PAC 6 Clicks Score (PT) 18  -LW       Row Name 07/14/25 1345          Functional Assessment    Outcome Measure Options AM-PAC 6 Clicks Daily Activity (OT)  -               User Key  (r) = Recorded By, (t) = Taken By, (c) = Cosigned By      Initials Name Provider Type    Ev Torres, RN Registered Nurse    Joselyn Castrejon OTR/L Occupational Therapist                    Occupational Therapy Education       Title: PT OT SLP Therapies (Done)       Topic: Occupational Therapy (Done)       Point: ADL training (Done)       Learning Progress Summary            Patient Acceptance, E,D, VU,DU by  at 7/14/2025 1504                      Point: Precautions (Done)       Learning Progress Summary            Patient Acceptance, E,D, VU,DU by  at 7/14/2025 1504                      Point: Body mechanics (Done)       Learning Progress Summary            Patient Acceptance, E,D, VU,DU by  at 7/14/2025 1504                                      User Key       Initials Effective Dates Name Provider Type [facility] 10/08/24 -  Joselyn Romero OTR/L Occupational Therapist OT                  OT Recommendation and Plan  Planned Therapy Interventions (OT): transfer/mobility retraining, strengthening exercise, patient/caregiver education/training, occupation/activity based interventions, functional balance retraining, activity tolerance training, BADL retraining  Therapy Frequency (OT): 5 times/wk  Plan of Care Review  Plan of Care Reviewed With: patient, spouse  Progress: improving  Outcome Evaluation: OT evaluation completed. Pt is A&O x4, family present including spouse. Pt s/p  "TLIF L2-5. At baseline, pt was independent including driving, working, etc. Today, pt presents fowlers in bed, 5/10 pain through lower back and down LLE. Pt educated on spinal precautions and logrolling to come EOB. Pt completes logrolling technique w/ CGA and VCs. Pt sits EOB for LSO education including how to don/doff and when to wear. Dependent to don today. Pt completes sit<>stand w/ CGa and extra time, extra time to complete t/f to chair as he refuses HHA at this time due to \"wanting to do himself.\" Pt positioned in chair for comfort. BUe ROM WFL, no strength deficits noted. Pt educated on moving, changing positions hourly but to call out for assistance while here. OT to cont to see pt for deficits adn to return to PLOF. Recommend d/c home with 24/7 care once medically stable. Pt left in recliner reclined w/ call light and family,     Time Calculation:         Time Calculation- OT       Row Name 07/14/25 1345             Time Calculation- OT    OT Start Time 1345  -KP      OT Stop Time 1445  -KP      OT Time Calculation (min) 60 min  -KP      OT Received On 07/14/25  -KP      OT Goal Re-Cert Due Date 07/24/25  -KP         Untimed Charges    OT Eval/Re-eval Minutes 60  -KP         Total Minutes    Untimed Charges Total Minutes 60  -KP       Total Minutes 60  -KP                User Key  (r) = Recorded By, (t) = Taken By, (c) = Cosigned By      Initials Name Provider Type     Joselyn oRmero OTR/L Occupational Therapist                  Therapy Charges for Today       Code Description Service Date Service Provider Modifiers Qty    [id number] HC OT EVAL LOW COMPLEXITY 4 7/14/2025 Joselyn Romero OTR/L GO 1                 Joselyn Romero OTR/L  7/14/2025  "

## 2025-07-14 NOTE — ANESTHESIA PROCEDURE NOTES
Airway  Reason: elective    Date/Time: 7/14/2025 7:21 AM  Airway not difficult    General Information and Staff    Patient location during procedure: OR  CRNA/CAA: Annette Castaneda CRNA    Indications and Patient Condition  Indications for airway management: airway protection    Preoxygenated: yes    Mask difficulty assessment: 0 - not attempted    Final Airway Details    Final airway type: endotracheal airway      Successful airway: ETT  Cuffed: yes   Successful intubation technique: direct laryngoscopy and video laryngoscopy  Adjuncts used in placement: intubating stylet  Endotracheal tube insertion site: oral  Blade: [place]  Blade size: 4  ETT size (mm): 7.5  Cormack-Lehane Classification: grade I - full view of glottis  Placement verified by: chest auscultation and capnometry   Cuff volume (mL): 8  Measured from: lips  ETT/EBT  to lips (cm): 23  Number of attempts at approach: 1  Assessment: lips, teeth, and gum same as pre-op and atraumatic intubation

## 2025-07-15 LAB
ANION GAP SERPL CALCULATED.3IONS-SCNC: 10 MMOL/L (ref 5–15)
BASOPHILS # BLD AUTO: 0.03 10*3/MM3 (ref 0–0.2)
BASOPHILS NFR BLD AUTO: 0.2 % (ref 0–1.5)
BUN SERPL-MCNC: 13.5 MG/DL (ref 8–23)
BUN/CREAT SERPL: 16.1 (ref 7–25)
CALCIUM SPEC-SCNC: 8.6 MG/DL (ref 8.6–10.5)
CHLORIDE SERPL-SCNC: 101 MMOL/L (ref 98–107)
CO2 SERPL-SCNC: 24 MMOL/L (ref 22–29)
CREAT SERPL-MCNC: 0.84 MG/DL (ref 0.76–1.27)
CYTO UR: NORMAL
DEPRECATED RDW RBC AUTO: 48.6 FL (ref 37–54)
EGFRCR SERPLBLD CKD-EPI 2021: 91.5 ML/MIN/1.73
EOSINOPHIL # BLD AUTO: 0 10*3/MM3 (ref 0–0.4)
EOSINOPHIL NFR BLD AUTO: 0 % (ref 0.3–6.2)
ERYTHROCYTE [DISTWIDTH] IN BLOOD BY AUTOMATED COUNT: 14.3 % (ref 12.3–15.4)
GLUCOSE SERPL-MCNC: 123 MG/DL (ref 65–99)
HCT VFR BLD AUTO: 33 % (ref 37.5–51)
HGB BLD-MCNC: 10.4 G/DL (ref 13–17.7)
IMM GRANULOCYTES # BLD AUTO: 0.07 10*3/MM3 (ref 0–0.05)
IMM GRANULOCYTES NFR BLD AUTO: 0.5 % (ref 0–0.5)
LAB AP CASE REPORT: NORMAL
LYMPHOCYTES # BLD AUTO: 1.83 10*3/MM3 (ref 0.7–3.1)
LYMPHOCYTES NFR BLD AUTO: 13.7 % (ref 19.6–45.3)
Lab: NORMAL
MCH RBC QN AUTO: 28.9 PG (ref 26.6–33)
MCHC RBC AUTO-ENTMCNC: 31.5 G/DL (ref 31.5–35.7)
MCV RBC AUTO: 91.7 FL (ref 79–97)
MONOCYTES # BLD AUTO: 1.48 10*3/MM3 (ref 0.1–0.9)
MONOCYTES NFR BLD AUTO: 11.1 % (ref 5–12)
NEUTROPHILS NFR BLD AUTO: 74.5 % (ref 42.7–76)
NEUTROPHILS NFR BLD AUTO: 9.96 10*3/MM3 (ref 1.7–7)
NRBC BLD AUTO-RTO: 0 /100 WBC (ref 0–0.2)
PATH REPORT.FINAL DX SPEC: NORMAL
PATH REPORT.GROSS SPEC: NORMAL
PLATELET # BLD AUTO: 223 10*3/MM3 (ref 140–450)
PMV BLD AUTO: 10.6 FL (ref 6–12)
POTASSIUM SERPL-SCNC: 4.5 MMOL/L (ref 3.5–5.2)
RBC # BLD AUTO: 3.6 10*6/MM3 (ref 4.14–5.8)
SODIUM SERPL-SCNC: 135 MMOL/L (ref 136–145)
WBC NRBC COR # BLD AUTO: 13.37 10*3/MM3 (ref 3.4–10.8)

## 2025-07-15 PROCEDURE — A9270 NON-COVERED ITEM OR SERVICE: HCPCS | Performed by: NURSE PRACTITIONER

## 2025-07-15 PROCEDURE — 85025 COMPLETE CBC W/AUTO DIFF WBC: CPT | Performed by: NURSE PRACTITIONER

## 2025-07-15 PROCEDURE — 97535 SELF CARE MNGMENT TRAINING: CPT

## 2025-07-15 PROCEDURE — 80048 BASIC METABOLIC PNL TOTAL CA: CPT | Performed by: NURSE PRACTITIONER

## 2025-07-15 PROCEDURE — 63710000001 GABAPENTIN 300 MG CAPSULE: Performed by: NURSE PRACTITIONER

## 2025-07-15 PROCEDURE — 63710000001 AMLODIPINE 10 MG TABLET: Performed by: NURSE PRACTITIONER

## 2025-07-15 PROCEDURE — 63710000001 SENNOSIDES-DOCUSATE 8.6-50 MG TABLET: Performed by: NURSE PRACTITIONER

## 2025-07-15 PROCEDURE — 97530 THERAPEUTIC ACTIVITIES: CPT

## 2025-07-15 PROCEDURE — 97161 PT EVAL LOW COMPLEX 20 MIN: CPT | Performed by: PHYSICAL THERAPIST

## 2025-07-15 PROCEDURE — 99024 POSTOP FOLLOW-UP VISIT: CPT | Performed by: NURSE PRACTITIONER

## 2025-07-15 PROCEDURE — 63710000001 HYDROCHLOROTHIAZIDE 25 MG TABLET: Performed by: NURSE PRACTITIONER

## 2025-07-15 PROCEDURE — 63710000001 ATORVASTATIN 40 MG TABLET: Performed by: NURSE PRACTITIONER

## 2025-07-15 PROCEDURE — 63710000001 OXYCODONE-ACETAMINOPHEN 7.5-325 MG TABLET: Performed by: NURSE PRACTITIONER

## 2025-07-15 PROCEDURE — 63710000001 LOSARTAN 50 MG TABLET: Performed by: NURSE PRACTITIONER

## 2025-07-15 RX ORDER — OXYCODONE AND ACETAMINOPHEN 7.5; 325 MG/1; MG/1
1 TABLET ORAL EVERY 4 HOURS PRN
Refills: 0 | Status: DISCONTINUED | OUTPATIENT
Start: 2025-07-15 | End: 2025-07-17 | Stop reason: HOSPADM

## 2025-07-15 RX ORDER — MORPHINE SULFATE 2 MG/ML
2 INJECTION, SOLUTION INTRAMUSCULAR; INTRAVENOUS
Status: DISCONTINUED | OUTPATIENT
Start: 2025-07-15 | End: 2025-07-17 | Stop reason: HOSPADM

## 2025-07-15 RX ADMIN — TIMOLOL MALEATE 1 DROP: 5 SOLUTION/ DROPS OPHTHALMIC at 20:36

## 2025-07-15 RX ADMIN — LOSARTAN POTASSIUM 100 MG: 50 TABLET, FILM COATED ORAL at 09:19

## 2025-07-15 RX ADMIN — Medication 3 ML: at 09:19

## 2025-07-15 RX ADMIN — TIMOLOL MALEATE 1 DROP: 5 SOLUTION/ DROPS OPHTHALMIC at 09:24

## 2025-07-15 RX ADMIN — ATORVASTATIN CALCIUM 40 MG: 40 TABLET, FILM COATED ORAL at 09:19

## 2025-07-15 RX ADMIN — HYDROCHLOROTHIAZIDE 12.5 MG: 25 TABLET ORAL at 09:19

## 2025-07-15 RX ADMIN — OXYCODONE HYDROCHLORIDE AND ACETAMINOPHEN 1 TABLET: 7.5; 325 TABLET ORAL at 10:14

## 2025-07-15 RX ADMIN — GABAPENTIN 600 MG: 300 CAPSULE ORAL at 05:47

## 2025-07-15 RX ADMIN — AMLODIPINE BESYLATE 10 MG: 10 TABLET ORAL at 09:19

## 2025-07-15 RX ADMIN — SENNOSIDES, DOCUSATE SODIUM 2 TABLET: 50; 8.6 TABLET, FILM COATED ORAL at 20:36

## 2025-07-15 RX ADMIN — GABAPENTIN 600 MG: 300 CAPSULE ORAL at 14:21

## 2025-07-15 RX ADMIN — Medication 3 ML: at 20:36

## 2025-07-15 RX ADMIN — OXYCODONE HYDROCHLORIDE AND ACETAMINOPHEN 1 TABLET: 7.5; 325 TABLET ORAL at 14:21

## 2025-07-15 RX ADMIN — LEVOTHYROXINE SODIUM 25 MCG: 0.03 TABLET ORAL at 05:47

## 2025-07-15 RX ADMIN — LATANOPROST 1 DROP: 50 SOLUTION OPHTHALMIC at 20:36

## 2025-07-15 RX ADMIN — GABAPENTIN 600 MG: 300 CAPSULE ORAL at 20:36

## 2025-07-15 NOTE — THERAPY EVALUATION
Patient Name: Jaime Mckeon  : 1950    MRN: [id number]                              Today's Date: 7/15/2025       Admit Date: 2025    Visit Dx:     ICD-10-CM ICD-9-CM   1. Impaired mobility [Z74.09]  Z74.09 799.89   2. Lumbar stenosis with neurogenic claudication  M48.062 724.03   3. Degeneration of intervertebral disc of lumbar region with discogenic back pain and lower extremity pain  M51.362 722.52     Patient Active Problem List   Diagnosis    Encounter for screening for malignant neoplasm of colon    Stroke    Cerebrovascular accident (CVA), unspecified mechanism    Essential hypertension    Hx of adenomatous colonic polyps    Lumbar stenosis with neurogenic claudication    Degeneration of intervertebral disc of lumbar region with discogenic back pain and lower extremity pain    Lumbar degenerative disc disease     Past Medical History:   Diagnosis Date    Allergic rhinitis     Arthritis     Back pain 10/2024    BPH (benign prostatic hyperplasia)     Claustrophobia     Hemorrhoid     History of cardioembolic cerebrovascular accident (CVA)     History of transfusion     Has had a blood transfusion, in     Hyperlipidemia     Hypertension     Hypothyroidism     Low back pain     Lumbosacral disc disease     Macular degeneration, 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 parekh-One 2 mm adenomatous polyp in the cecum, One 3 mm fragments of sessile serrated adenoma polyp at the hepatic flexure at 80 cm proximal to the anus,  One 6 mm adenomatous polyp at 80 cm proximal to the anus,  One 5 mm hyperplastic  polyp at 40 cm proximal to the anus - Diverticulosis in the sigmoid colon.    COLONOSCOPY N/A 2025    Procedure: COLONOSCOPY WITH ANESTHESIA;  Surgeon: Elan Parekh MD;   Location:  [facility];  Service: Gastroenterology;  Laterality: N/A;  pre polyp hx  post diverticulosis      FINGER SURGERY      CYST REMOVAL X3    LUMBAR LAMINECTOMY WITH FUSION Left 7/14/2025    Procedure: LUMBAR LEFT DISCECTOMY, HEMILAMINECTOMY, FACETECTOMY L23,34,45 TRANSFORAMINAL INTERBODY FUSION WITH NEURO ROBOT;  Surgeon: Corby Edwards MD;  Location: [facility] OR;  Service: Robotics - Neuro;  Laterality: Left;    VASECTOMY Bilateral       General Information       Row Name 07/15/25 0942 07/15/25 0941       Physical Therapy Time and Intention    Document Type evaluation  Pt presents to hospital for LUMBAR LEFT DISCECTOMY, HEMILAMINECTOMY, FACETECTOMY L23,34,45 TRANSFORAMINAL INTERBODY FUSION WITH NEURO ROBOT  -MS (r) AD (t) MS (c) -- (P)   -AD    Mode of Treatment physical therapy  -MS (r) AD (t) MS (c) --      Row Name 07/15/25 0942 07/15/25 0941       General Information    Patient Profile Reviewed yes  -MS (r) AD (t) MS (c) -- (P)   -AD    Prior Level of Function independent:;gait;transfer;driving;bed mobility;bathing;dressing;grooming  walk-in shower, bench in shower  -MS (r) AD (t) MS (c) --    Existing Precautions/Restrictions fall;brace worn when out of bed;spinal;LSO   LSO brace to be worn when OOB, spinal precautions  -MS (r) AD (t) MS (c) --    Barriers to Rehab medically complex  -MS (r) AD (t) MS (c) --      Row Name 07/15/25 0942          Living Environment    Current Living Arrangements home  -MS (r) AD (t) MS (c)     People in Home spouse  -MS (r) AD (t) MS (c)       Row Name 07/15/25 0942          Home Main Entrance    Number of Stairs, Main Entrance none  -MS (r) AD (t) MS (c)     Stair Railings, Main Entrance none  -MS (r) AD (t) MS (c)       Row Name 07/15/25 0942          Stairs Within Home, Primary    Number of Stairs, Within Home, Primary one  -MS (r) AD (t) MS (c)     Stair Railings, Within Home, Primary none  -MS (r) AD (t) MS (c)       Row Name 07/15/25 0998           Cognition    Orientation Status (Cognition) oriented x 4  -MS (r) AD (t) MS (c)       Row Name 07/15/25 0942          Safety Issues/Impairments Affecting Functional Mobility    Impairments Affecting Function (Mobility) balance;pain  -MS (r) AD (t) MS (c)               User Key  (r) = Recorded By, (t) = Taken By, (c) = Cosigned By      Initials Name Provider Type    MS Hailey Ruiz R, PT, DPT, NCS Physical Therapist    Ramon Yu PT Student PT Student                   Mobility       Row Name 07/15/25 0942 07/15/25 0941       Bed Mobility    Bed Mobility rolling right;sidelying-sit  -MS (r) AD (t) MS (c) -- (P)   -AD    Rolling Right [place] (Bed Mobility) standby assist;verbal cues;nonverbal cues (demo/gesture)  -MS (r) AD (t) MS (c) --    Sidelying-Sit [place] (Bed Mobility) contact guard;verbal cues  -MS (r) AD (t) MS (c) --    Assistive Device (Bed Mobility) bed rails;head of bed elevated  -MS (r) AD (t) MS (c) --      Row Name 07/15/25 0942          Bed-Chair Transfer    Bed-Chair [place] (Transfers) not tested  -MS (r) AD (t) MS (c)       Row Name 07/15/25 0942          Sit-Stand Transfer    Sit-Stand [place] (Transfers) verbal cues;minimum assist (75% patient effort);1 person assist  -MS (r) AD (t) MS (c)     Assistive Device (Sit-Stand Transfers) walker, front-wheeled  -MS (r) AD (t) MS (c)     Comment, (Sit-Stand Transfer) Bed raised  -MS (r) AD (t) MS (c)       Row Name 07/15/25 0942          Gait/Stairs (Locomotion)    [place] Level (Gait) contact guard;1 person assist  -MS (r) AD (t) MS (c)     Assistive Device (Gait) walker, front-wheeled  -MS (r) AD (t) MS (c)     Patient was able to Ambulate yes  -MS (r) AD (t) MS (c)     Distance in Feet (Gait) 150  -MS (r) AD (t) MS (c)     Deviations/Abnormal Patterns (Gait) base of support, narrow;stride length decreased;demetrius decreased  -MS (r) AD (t) MS (c)     Right Sided Gait Deviations knee buckling, right side  -MS (r)  AD (t) MS (c)     [place] Level (Stairs) not tested  -MS (r) AD (t) MS (c)               User Key  (r) = Recorded By, (t) = Taken By, (c) = Cosigned By      Initials Name Provider Type    Hailey Schroeder MIRIAM, PT, DPT, NCS Physical Therapist    Ramon Yu, PT Student PT Student                   Obj/Interventions       Row Name 07/15/25 0942          Range of Motion Comprehensive    General Range of Motion no range of motion deficits identified  -MS (r) AD (t) MS (c)       Row Name 07/15/25 0942          Strength Comprehensive (MMT)    General Manual Muscle Testing (MMT) Assessment lower extremity strength deficits identified  -MS (r) AD (t) MS (c)     Comment, General Manual Muscle Testing (MMT) Assessment BLE functionally assessed 4+/5  -MS (r) AD (t) MS (c)       Row Name 07/15/25 0942          Balance    Balance Assessment sitting static balance;sitting dynamic balance;standing static balance;standing dynamic balance  -MS (r) AD (t) MS (c)     Static Sitting Balance supervision  -MS (r) AD (t) MS (c)     Dynamic Sitting Balance supervision  -MS (r) AD (t) MS (c)     Position, Sitting Balance unsupported;sitting edge of bed  -MS (r) AD (t) MS (c)     Static Standing Balance contact guard  -MS (r) AD (t) MS (c)     Dynamic Standing Balance contact guard  -MS (r) AD (t) MS (c)     Position/Device Used, Standing Balance supported;walker, front-wheeled  -MS (r) AD (t) MS (c)       Row Name 07/15/25 0942          Sensory Assessment (Somatosensory)    Sensory Assessment (Somatosensory) LE sensation intact  -MS (r) AD (t) MS (c)               User Key  (r) = Recorded By, (t) = Taken By, (c) = Cosigned By      Initials Name Provider Type    Hailey Schroeder MIRIAM, PT, DPT, NCS Physical Therapist    Ramon Yu, PT Student PT Student                   Goals/Plan       Row Name 07/15/25 0942          Bed Mobility Goal 1 (PT)    Activity/Assistive Device (Bed Mobility Goal 1, PT) rolling to left;rolling to  right;sidelying to sit/sit to sidelying  -MS (r) AD (t) MS (c)     [place] Level/Cues Needed (Bed Mobility Goal 1, PT) independent  -MS (r) AD (t) MS (c)     Time Frame (Bed Mobility Goal 1, PT) long term goal (LTG)  -MS (r) AD (t) MS (c)     Progress/Outcomes (Bed Mobility Goal 1, PT) new goal  -MS (r) AD (t) MS (c)       Row Name 07/15/25 0942          Transfer Goal 1 (PT)    Activity/Assistive Device (Transfer Goal 1, PT) sit-to-stand/stand-to-sit;bed-to-chair/chair-to-bed;walker, rolling  -MS (r) AD (t) MS (c)     [place] Level/Cues Needed (Transfer Goal 1, PT) modified independence  -MS (r) AD (t) MS (c)     Time Frame (Transfer Goal 1, PT) long term goal (LTG)  -MS (r) AD (t) MS (c)     Progress/Outcome (Transfer Goal 1, PT) new goal  -MS (r) AD (t) MS (c)       Row Name 07/15/25 0942          Gait Training Goal 1 (PT)    Activity/Assistive Device (Gait Training Goal 1, PT) gait (walking locomotion);assistive device use;decrease fall risk;improve balance and speed;diminish gait deviation;decrease asymmetrical patterns;increase endurance/gait distance;walker, rolling  -MS (r) AD (t) MS (c)     [place] Level (Gait Training Goal 1, PT) modified independence  -MS (r) AD (t) MS (c)     Distance (Gait Training Goal 1, PT) 120' w/ increased step and stride length  -MS (r) AD (t) MS (c)     Time Frame (Gait Training Goal 1, PT) long term goal (LTG)  -MS (r) AD (t) MS (c)     Progress/Outcome (Gait Training Goal 1, PT) new goal  -MS (r) AD (t) MS (c)       Row Name 07/15/25 0942          Balance Goal 1 (PT)    Activity/Assistive Device (Balance Goal) sitting static balance;sitting dynamic balance;standing static balance;standing dynamic balance;walker, rolling  -MS (r) AD (t) MS (c)     [place] Level/Cues Needed (Balance Goal 1, PT) modified independence  -MS (r) AD (t) MS (c)     Time Frame (Balance Goal 1, PT) long-term goal (LTG)  -MS (r) AD (t) MS (c)     Progress/Outcomes (Balance Goal 1, PT)  goal ongoing  -MS (r) AD (t) MS (c)       Row Name 07/15/25 0942          Stairs Goal 1 (PT)    Activity/Assistive Device (Stairs Goal 1, PT) ascending stairs;descending stairs;using handrail, right;using handrail, left;step-over step;decrease fall risk;improve balance and speed  -MS (r) AD (t) MS (c)     [place] Level/Cues Needed (Stairs Goal 1, PT) modified independence  -MS (r) AD (t) MS (c)     Number of Stairs (Stairs Goal 1, PT) 3  -MS (r) AD (t) MS (c)     Time Frame (Stairs Goal 1, PT) long term goal (LTG)  -MS (r) AD (t) MS (c)     Progress/Outcome (Stairs Goal 1, PT) new goal  -MS (r) AD (t) MS (c)       Row Name 07/15/25 0942          Problem Specific Goal 1 (PT)    Problem Specific Goal 1 (PT) decrease pain w/ amb & mob to 2/10  -MS (r) AD (t) MS (c)       Row Name 07/15/25 0942          Therapy Assessment/Plan (PT)    Planned Therapy Interventions (PT) balance training;bed mobility training;patient/family education;stair training;transfer training;strengthening;postural re-education;gait training  -MS (r) AD (t) MS (c)               User Key  (r) = Recorded By, (t) = Taken By, (c) = Cosigned By      Initials Name Provider Type    Hailey Schroeder, PT, DPT, NCS Physical Therapist    Ramon Yu, PT Student PT Student                   Clinical Impression       Row Name 07/15/25 0942          Pain    Pretreatment Pain Rating 5/10  -MS (r) AD (t) MS (c)     Posttreatment Pain Rating 4/10  -MS (r) AD (t) MS (c)     Pain Location back;extremity  -MS (r) AD (t) MS (c)     Pain Side/Orientation bilateral;generalized  -MS (r) AD (t) MS (c)     Pain Management Interventions activity modification encouraged;exercise or physical activity utilized;positioning techniques utilized  -MS (r) AD (t) MS (c)     Response to Pain Interventions activity participation with decreased pain  -MS (r) AD (t) MS (c)       Row Name 07/15/25 0942          Plan of Care Review    Plan of Care Reviewed With  patient;family;spouse (P)   -AD     Progress no change  -MS (r) AD (t) MS (c)     Outcome Evaluation PT hallie completed s/p back sx. Pt is AOx4, seen in fowlers w/ family at bedside, and reported experiecing pain from moving wrong prior to therapy entering the room. Pt performed bed mob w/ VC,TC & SBA and was educated on log rolling to maintain spinal precautions. Pt demonstrated all sitting balance while EOB with supervision and did not report and dizziness or SOA. Pt reported BLE sensation is intact and symmetrical. Pt performed sts w/ min A & FWW. Pt demonstrated all standing balance w/ CGA & FW. BLE mm strength demonstrated functionally grossly at 4+/5. Pt amb 150' w/ CGA & FWW with decreased step and stride length, occasional buckling of bilat knees, and decreased confidence w/ amb. Pt did not wish to attempt the steps today but was open to trying at a later time, and he was educated that is a safety concern before a d/c home. Pt performed stand<>sit w/ SBA & VC. Skilled therapy is indicated for this pt to improve strength, endurance, and confidence w/ amb & mob. Anticipated d/c to home w/ assist when medically stable.  -MS (r) AD (t) MS (c)       Row Name 07/15/25 0942          Therapy Assessment/Plan (PT)    Patient/Family Therapy Goals Statement (PT) none stated  -MS (r) AD (t) MS (c)     Rehab Potential (PT) good  -MS (r) AD (t) MS (c)     Criteria for Skilled Interventions Met (PT) yes;meets criteria;skilled treatment is necessary  -MS (r) AD (t) MS (c)     Therapy Frequency (PT) 2 times/day  -MS (r) AD (t) MS (c)     Predicted Duration of Therapy Intervention (PT) until d/c or goals met  -MS (r) AD (t) MS (c)       Row Name 07/15/25 0942          Vital Signs    O2 Delivery Pre Treatment room air  -MS (r) AD (t) MS (c)     O2 Delivery Intra Treatment room air  -MS (r) AD (t) MS (c)     O2 Delivery Post Treatment room air  -MS (r) AD (t) MS (c)       Row Name 07/15/25 0942          Positioning and Restraints     Pre-Treatment Position in bed  -MS (r) AD (t) MS (c)     Post Treatment Position chair  -MS (r) AD (t) MS (c)     In Chair sitting;call light within reach;with nsg;notified nsg;with family/caregiver;exit alarm on;encouraged to call for assist  -MS (r) AD (t) MS (c)               User Key  (r) = Recorded By, (t) = Taken By, (c) = Cosigned By      Initials Name Provider Type    Hailey Schroeder R, PT, DPT, NCS Physical Therapist    Ramon Yu, PT Student PT Student                   Outcome Measures       Row Name 07/15/25 0942 07/15/25 0919       How much help from another person do you currently need...    Turning from your back to your side while in flat bed without using bedrails? 3 (P)   -AD 3  -SD    Moving from lying on back to sitting on the side of a flat bed without bedrails? 3 (P)   -AD 3  -SD    Moving to and from a bed to a chair (including a wheelchair)? 3 (P)   -AD 3  -SD    Standing up from a chair using your arms (e.g., wheelchair, bedside chair)? 3 (P)   -AD 3  -SD    Climbing 3-5 steps with a railing? 3 (P)   -AD 3  -SD    To walk in hospital room? 3 (P)   -AD 3  -SD    AM-PAC 6 Clicks Score (PT) 18 (P)   -AD 18  -SD    Highest Level of Mobility Goal Walk 10 Steps or More-6 (P)   -AD Walk 10 Steps or More-6  -SD      Row Name 07/15/25 0942 07/15/25 0940       Functional Assessment    Outcome Measure Options AM-PAC 6 Clicks Basic Mobility (PT) (P)   -AD AM-PAC 6 Clicks Daily Activity (OT)  -KP              User Key  (r) = Recorded By, (t) = Taken By, (c) = Cosigned By      Initials Name Provider Type    Ingrid Copeland LPN Licensed Nurse    Joselyn Castrejon, OTR/L Occupational Therapist    Ramon Yu, PT Student PT Student                                 Physical Therapy Education       Title: PT OT SLP Therapies (Done)       Topic: Physical Therapy (Done)       Point: Mobility training (Done)       Learning Progress Summary            Patient Acceptance, E, NR,VU by AD at  7/15/2025 1042    Comment: donning and doffing brace, spinal precautions, time to wear brace, POC, d/c plans, safety conerns prior to d/c needing to be addressed   Family Acceptance, E, NR,VU by AD at 7/15/2025 1042    Comment: donning and doffing brace, spinal precautions, time to wear brace, POC, d/c plans, safety conerns prior to d/c needing to be addressed   Significant Other Acceptance, E, NR,VU by AD at 7/15/2025 1042    Comment: donning and doffing brace, spinal precautions, time to wear brace, POC, d/c plans, safety conerns prior to d/c needing to be addressed                      Point: Home exercise program (Done)       Learning Progress Summary            Patient Acceptance, E, NR,VU by AD at 7/15/2025 1042    Comment: donning and doffing brace, spinal precautions, time to wear brace, POC, d/c plans, safety conerns prior to d/c needing to be addressed   Family Acceptance, E, NR,VU by AD at 7/15/2025 1042    Comment: donning and doffing brace, spinal precautions, time to wear brace, POC, d/c plans, safety conerns prior to d/c needing to be addressed   Significant Other Acceptance, E, NR,VU by AD at 7/15/2025 1042    Comment: donning and doffing brace, spinal precautions, time to wear brace, POC, d/c plans, safety conerns prior to d/c needing to be addressed                      Point: Body mechanics (Done)       Learning Progress Summary            Patient Acceptance, E, NR,VU by AD at 7/15/2025 1042    Comment: donning and doffing brace, spinal precautions, time to wear brace, POC, d/c plans, safety conerns prior to d/c needing to be addressed   Family Acceptance, E, NR,VU by AD at 7/15/2025 1042    Comment: donning and doffing brace, spinal precautions, time to wear brace, POC, d/c plans, safety conerns prior to d/c needing to be addressed   Significant Other Acceptance, E, NR,VU by AD at 7/15/2025 1042    Comment: donning and doffing brace, spinal precautions, time to wear brace, POC, d/c plans, safety  conerns prior to d/c needing to be addressed                      Point: Precautions (Done)       Learning Progress Summary            Patient Acceptance, E, NR,VU by AD at 7/15/2025 1042    Comment: donning and doffing brace, spinal precautions, time to wear brace, POC, d/c plans, safety conerns prior to d/c needing to be addressed   Family Acceptance, E, NR,VU by AD at 7/15/2025 1042    Comment: donning and doffing brace, spinal precautions, time to wear brace, POC, d/c plans, safety conerns prior to d/c needing to be addressed   Significant Other Acceptance, E, NR,VU by AD at 7/15/2025 1042    Comment: donning and doffing brace, spinal precautions, time to wear brace, POC, d/c plans, safety conerns prior to d/c needing to be addressed                                      User Key       Initials Effective Dates Name Provider Type Discipline    AD 04/21/25 -  Ramon Fowler, PT Student PT Student PT                  PT Recommendation and Plan  Planned Therapy Interventions (PT): balance training, bed mobility training, patient/family education, stair training, transfer training, strengthening, postural re-education, gait training  Progress: no change  Outcome Evaluation: PT eval completed s/p back sx. Pt is AOx4, seen in fowlers w/ family at bedside, and reported experiecing pain from moving wrong prior to therapy entering the room. Pt performed bed mob w/ VC,TC & SBA and was educated on log rolling to maintain spinal precautions. Pt demonstrated all sitting balance while EOB with supervision and did not report and dizziness or SOA. Pt reported BLE sensation is intact and symmetrical. Pt performed sts w/ min A & FWW. Pt demonstrated all standing balance w/ CGA & FW. BLE mm strength demonstrated functionally grossly at 4+/5. Pt amb 150' w/ CGA & FWW with decreased step and stride length, occasional buckling of bilat knees, and decreased confidence w/ amb. Pt did not wish to attempt the steps today but was open to  trying at a later time, and he was educated that is a safety concern before a d/c home. Pt performed stand<>sit w/ SBA & VC. Skilled therapy is indicated for this pt to improve strength, endurance, and confidence w/ amb & mob. Anticipated d/c to home w/ assist when medically stable.     Time Calculation:         PT Charges       Row Name 07/15/25 1043             Time Calculation    Start Time 0940 (P)   add 5 mins for chart review  -AD      Stop Time 1019 (P)   -AD      Time Calculation (min) 39 min (P)   -AD      PT Received On 07/15/25 (P)   -AD      PT Goal Re-Cert Due Date 07/25/25 (P)   -AD         Untimed Charges    PT Eval/Re-eval Minutes 44 (P)   -AD         Total Minutes    Untimed Charges Total Minutes 44 (P)   -AD       Total Minutes 44 (P)   -AD                User Key  (r) = Recorded By, (t) = Taken By, (c) = Cosigned By      Initials Name Provider Type    Ramon Yu PT Student PT Student                      PT G-Codes  Outcome Measure Options: (P) AM-PAC 6 Clicks Basic Mobility (PT)  AM-PAC 6 Clicks Score (PT): (P) 18  AM-PAC 6 Clicks Score (OT): 19  PT Discharge Summary  Anticipated Discharge Disposition (PT): home with assist    Ramon Quiles PT Student  7/15/2025

## 2025-07-15 NOTE — PROGRESS NOTES
"Jaime Mckeon  75 y.o.      Chief complaint:   Back pain status post lumbar fusion    Subjective  No events overnight    Temp:  [97.2 °F (36.2 °C)-99.1 °F (37.3 °C)] 99.1 °F (37.3 °C)  Heart Rate:  [63-91] 70  Resp:  [8-18] 14  BP: (106-142)/(59-85) 133/60      Objective:  General Appearance:  Comfortable, well-appearing, in no acute distress and in pain.    Vital signs: (most recent): Blood pressure 133/60, pulse 70, temperature 99.1 °F (37.3 °C), temperature source Oral, resp. rate 14, height 181 cm (71.26\"), weight 107 kg (235 lb 3.7 oz), SpO2 98%.  Vital signs are normal.  No fever.    Output: Producing urine.    HEENT: Normal HEENT exam.    Lungs:  Normal effort and normal respiratory rate.  He is not in respiratory distress.    Chest: Symmetric chest wall expansion.   Extremities: Normal range of motion.    Skin:  Warm and dry.    Pulses: Distal pulses are intact.        Neurological Exam  Mental Status  Awake. Oriented to person, place and time. Speech is normal. Language is fluent with no aphasia. Attention and concentration are normal.    Cranial Nerves  CN I: Sense of smell is normal.  CN II: Right normal visual field. Left normal visual field.  CN III, IV, VI: Extraocular movements intact bilaterally. Pupils equal round and reactive to light bilaterally.  CN V: Facial sensation is normal.  CN VII:  Right: There is no facial weakness.  Left: There is no facial weakness.  CN XI: Shoulder shrug strength is normal.  CN XII: Tongue midline without atrophy or fasciculations.    Motor  Normal muscle bulk throughout. Normal muscle tone. Strength is 5/5 throughout all four extremities.    Sensory  Sensation is intact to light touch, pinprick, vibration and proprioception in all four extremities.      Lab Results (last 24 hours)       Procedure Component Value Units Date/Time    Basic Metabolic Panel [[id number]]  (Abnormal) Collected: 07/15/25 0354    Specimen: Blood Updated: 07/15/25 0504     Glucose 123 mg/dL      " BUN 13.5 mg/dL      Creatinine 0.84 mg/dL      Sodium 135 mmol/L      Potassium 4.5 mmol/L      Chloride 101 mmol/L      CO2 24.0 mmol/L      Calcium 8.6 mg/dL      BUN/Creatinine Ratio 16.1     Anion Gap 10.0 mmol/L      eGFR 91.5 mL/min/1.73     Narrative:      GFR Categories in Chronic Kidney Disease (CKD)              GFR Category          GFR (mL/min/1.73)    Interpretation  G1                    90 or greater        Normal or high (1)  G2                    60-89                Mild decrease (1)  G3a                   45-59                Mild to moderate decrease  G3b                   30-44                Moderate to severe decrease  G4                    15-29                Severe decrease  G5                    14 or less           Kidney failure    (1)In the absence of evidence of kidney disease, neither GFR category G1 or G2 fulfill the criteria for CKD.    eGFR calculation 2021 CKD-EPI creatinine equation, which does not include race as a factor    CBC & Differential [[id number]]  (Abnormal) Collected: 07/15/25 0354    Specimen: Blood Updated: 07/15/25 0438    Narrative:      The following orders were created for panel order CBC & Differential.  Procedure                               Abnormality         Status                     ---------                               -----------         ------                     CBC Auto Differential[[id number]]        Abnormal            Final result                 Please view results for these tests on the individual orders.    CBC Auto Differential [[id number]]  (Abnormal) Collected: 07/15/25 0354    Specimen: Blood Updated: 07/15/25 0438     WBC 13.37 10*3/mm3      RBC 3.60 10*6/mm3      Hemoglobin 10.4 g/dL      Hematocrit 33.0 %      MCV 91.7 fL      MCH 28.9 pg      MCHC 31.5 g/dL      RDW 14.3 %      RDW-SD 48.6 fl      MPV 10.6 fL      Platelets 223 10*3/mm3      Neutrophil % 74.5 %      Lymphocyte % 13.7 %      Monocyte % 11.1 %      Eosinophil % 0.0 %       Basophil % 0.2 %      Immature Grans % 0.5 %      Neutrophils, Absolute 9.96 10*3/mm3      Lymphocytes, Absolute 1.83 10*3/mm3      Monocytes, Absolute 1.48 10*3/mm3      Eosinophils, Absolute 0.00 10*3/mm3      Basophils, Absolute 0.03 10*3/mm3      Immature Grans, Absolute 0.07 10*3/mm3      nRBC 0.0 /100 WBC     Tissue Pathology Exam [[id number]] Collected: 07/14/25 0956    Specimen: Disc from Spine, Lumbar Updated: 07/14/25 1334                Plan:   Patient doing well.  Patient work with physical and Occupational Therapy.  Will discontinue PCA and transition to oral and IV pain medicine.  Will monitor patient's progress for discharge planning.      Lumbar degenerative disc disease    Lumbar stenosis with neurogenic claudication    Degeneration of intervertebral disc of lumbar region with discogenic back pain and lower extremity pain        Jluis Parkinson, APRN

## 2025-07-15 NOTE — PLAN OF CARE
Goal Outcome Evaluation:  Plan of Care Reviewed With: patient        Progress: improving  Outcome Evaluation: OT tx completed. A&O x4, family present in room. Pt completed bed mobility using logrolling technique w/ SBA and VCs. Pt sits EOB, LSO donned and pt completes t/f to FWW w/ CGA and completes xl mob to Br to complete toileting w/ SBA. Pt then demonstrates ability to complete fxl mob w/ FWW household distance improving balance and stride w/ time, requiring CGA. LSO adjusted in standing and education provided on possible need for tightening when standing for improved support. Pt left in recliner w/ call light, family in room. Cont OT POC. Rec d/c home w/ assist    Anticipated Discharge Disposition (OT): home with 24/7 care

## 2025-07-15 NOTE — THERAPY TREATMENT NOTE
Patient Name: Jaime Mckeon  : 1950    MRN: [id number]                              Today's Date: 7/15/2025       Admit Date: 2025    Visit Dx:     ICD-10-CM ICD-9-CM   1. Impaired mobility [Z74.09]  Z74.09 799.89   2. Lumbar stenosis with neurogenic claudication  M48.062 724.03   3. Degeneration of intervertebral disc of lumbar region with discogenic back pain and lower extremity pain  M51.362 722.52     Patient Active Problem List   Diagnosis    Encounter for screening for malignant neoplasm of colon    Stroke    Cerebrovascular accident (CVA), unspecified mechanism    Essential hypertension    Hx of adenomatous colonic polyps    Lumbar stenosis with neurogenic claudication    Degeneration of intervertebral disc of lumbar region with discogenic back pain and lower extremity pain    Lumbar degenerative disc disease     Past Medical History:   Diagnosis Date    Allergic rhinitis     Arthritis     Back pain 10/2024    BPH (benign prostatic hyperplasia)     Claustrophobia     Hemorrhoid     History of cardioembolic cerebrovascular accident (CVA)     History of transfusion     Has had a blood transfusion, in     Hyperlipidemia     Hypertension     Hypothyroidism     Low back pain     Lumbosacral disc disease     Macular degeneration, 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 parekh-One 2 mm adenomatous polyp in the cecum, One 3 mm fragments of sessile serrated adenoma polyp at the hepatic flexure at 80 cm proximal to the anus,  One 6 mm adenomatous polyp at 80 cm proximal to the anus,  One 5 mm hyperplastic  polyp at 40 cm proximal to the anus - Diverticulosis in the sigmoid colon.    COLONOSCOPY N/A 2025    Procedure: COLONOSCOPY WITH ANESTHESIA;  Surgeon: Elan Parekh MD;   Location:  [facility];  Service: Gastroenterology;  Laterality: N/A;  pre polyp hx  post diverticulosis      FINGER SURGERY      CYST REMOVAL X3    LUMBAR LAMINECTOMY WITH FUSION Left 7/14/2025    Procedure: LUMBAR LEFT DISCECTOMY, HEMILAMINECTOMY, FACETECTOMY L23,34,45 TRANSFORAMINAL INTERBODY FUSION WITH NEURO ROBOT;  Surgeon: Corby Edwards MD;  Location:  [facility];  Service: Robotics - Neuro;  Laterality: Left;    VASECTOMY Bilateral       General Information       Row Name 07/15/25 0940          OT Time and Intention    Subjective Information complains of;pain  -KP     Document Type therapy note (daily note)  -     Mode of Treatment occupational therapy  -     Patient Effort good  -       Row Name 07/15/25 0940          General Information    Existing Precautions/Restrictions fall;brace worn when out of bed;spinal  -       Row Name 07/15/25 0940          Cognition    Orientation Status (Cognition) oriented x 4  -KP       Row Name 07/15/25 0940          Safety Issues/Impairments Affecting Functional Mobility    Impairments Affecting Function (Mobility) balance;pain  -               User Key  (r) = Recorded By, (t) = Taken By, (c) = Cosigned By      Initials Name Provider Type     Joselyn Romero, OTR/L Occupational Therapist                     Mobility/ADL's       Row Name 07/15/25 0940          Bed Mobility    Bed Mobility rolling right;sidelying-sit  -     Rolling Right [place] (Bed Mobility) standby assist;verbal cues  -     Sidelying-Sit [place] (Bed Mobility) standby assist;verbal cues  -     Assistive Device (Bed Mobility) bed rails  -       Row Name 07/15/25 0940          Transfers    Transfers sit-stand transfer;stand-sit transfer  -       Row Name 07/15/25 0940          Sit-Stand Transfer    Sit-Stand [place] (Transfers) verbal cues;contact guard  -     Assistive Device (Sit-Stand Transfers) walker, front-wheeled  -       Row Name 07/15/25 0940           Stand-Sit Transfer    Stand-Sit [place] (Transfers) verbal cues;contact guard  -     Assistive Device (Stand-Sit Transfers) walker, front-wheeled  -       Row Name 07/15/25 0940          Functional Mobility    Functional Mobility- Ind. Level contact guard assist;verbal cues required  -     Functional Mobility- Device walker, front-wheeled  -KP     Patient was able to Ambulate yes  -       Row Name 07/15/25 0940          Activities of Daily Living    BADL Assessment/Intervention toileting;upper body dressing  -       Row Name 07/15/25 0940          Upper Body Dressing Assessment/Training    Position (Upper Body Dressing) edge of bed sitting  -     Comment, (Upper Body Dressing) LSO donned w/ max A, continued education given  -       Row Name 07/15/25 0940          Toileting Assessment/Training    Comment, (Toileting) stood w/ SBA to urinate in BR  -               User Key  (r) = Recorded By, (t) = Taken By, (c) = Cosigned By      Initials Name Provider Type    Joselyn Castrejon OTR/L Occupational Therapist                   Obj/Interventions       Row Name 07/15/25 0940          Balance    Balance Assessment sitting static balance;sitting dynamic balance;standing static balance;standing dynamic balance  -     Static Sitting Balance supervision  -     Dynamic Sitting Balance standby assist  -     Position, Sitting Balance unsupported;sitting edge of bed  -     Static Standing Balance contact guard  -     Dynamic Standing Balance contact guard  -     Position/Device Used, Standing Balance supported;walker, front-wheeled  -KP               User Key  (r) = Recorded By, (t) = Taken By, (c) = Cosigned By      Initials Name Provider Type    Joselyn Castrejon OTR/L Occupational Therapist                   Goals/Plan    No documentation.                  Clinical Impression       Row Name 07/15/25 0940          Pain Assessment    Pretreatment Pain Rating 4/10  -     Posttreatment  Pain Rating 4/10  -     Pain Location back  -     Pain Side/Orientation generalized  -     Pain Management Interventions activity modification encouraged;exercise or physical activity utilized  -     Response to Pain Interventions activity participation with tolerable pain  -KP       Row Name 07/15/25 0940          Plan of Care Review    Plan of Care Reviewed With patient  -     Progress improving  -     Outcome Evaluation OT tx completed. A&O x4, family present in room. Pt completed bed mobility using logrolling technique w/ SBA and VCs. Pt sits EOB, LSO donned and pt completes t/f to FWW w/ CGA and completes xl mob to Br to complete toileting w/ SBA. Pt then demonstrates ability to complete fxl mob w/ FWW household distance improving balance and stride w/ time, requiring CGA. LSO adjusted in standing and education provided on possible need for tightening when standing for improved support. Pt left in recliner w/ call light, family in room. Cont OT POC. Rec d/c home w/ assist  -       Row Name 07/15/25 0940          Therapy Plan Review/Discharge Plan (OT)    Anticipated Discharge Disposition (OT) home with 24/7 care  -KP       Row Name 07/15/25 0940          Positioning and Restraints    Pre-Treatment Position in bed  -     Post Treatment Position chair  -KP     In Chair notified nsg;reclined;call light within reach;encouraged to call for assist;with family/caregiver  -               User Key  (r) = Recorded By, (t) = Taken By, (c) = Cosigned By      Initials Name Provider Type    Joselyn Castrejon, OTR/L Occupational Therapist                   Outcome Measures       Row Name 07/15/25 0940          How much help from another is currently needed...    Putting on and taking off regular lower body clothing? 2  -KP     Bathing (including washing, rinsing, and drying) 3  -KP     Toileting (which includes using toilet bed pan or urinal) 3  -KP     Putting on and taking off regular upper body clothing 3   -     Taking care of personal grooming (such as brushing teeth) 4  -     Eating meals 4  -     AM-PAC 6 Clicks Score (OT) 19  -       Row Name 07/15/25 0942 07/15/25 0919       How much help from another person do you currently need...    Turning from your back to your side while in flat bed without using bedrails? 3 (P)   -AD 3  -SD    Moving from lying on back to sitting on the side of a flat bed without bedrails? 3 (P)   -AD 3  -SD    Moving to and from a bed to a chair (including a wheelchair)? 3 (P)   -AD 3  -SD    Standing up from a chair using your arms (e.g., wheelchair, bedside chair)? 3 (P)   -AD 3  -SD    Climbing 3-5 steps with a railing? 3 (P)   -AD 3  -SD    To walk in hospital room? 3 (P)   -AD 3  -SD    AM-PAC 6 Clicks Score (PT) 18 (P)   -AD 18  -SD    Highest Level of Mobility Goal Walk 10 Steps or More-6 (P)   -AD Walk 10 Steps or More-6  -SD      Row Name 07/15/25 0942 07/15/25 0940       Functional Assessment    Outcome Measure Options AM-PAC 6 Clicks Basic Mobility (PT) (P)   -AD AM-PAC 6 Clicks Daily Activity (OT)  -              User Key  (r) = Recorded By, (t) = Taken By, (c) = Cosigned By      Initials Name Provider Type    Ingrid Copeland LPN Licensed Nurse    Joselyn Castrejon, OTR/L Occupational Therapist    Ramon Yu, PT Student PT Student                    Occupational Therapy Education       Title: PT OT SLP Therapies (Done)       Topic: Occupational Therapy (Done)       Point: ADL training (Done)       Learning Progress Summary            Patient Acceptance, E,D, VU,DU by  at 7/15/2025 1253    Acceptance, E,D, VU,DU by  at 7/14/2025 1504   Family Acceptance, E,D, VU,DU by  at 7/15/2025 1253                      Point: Precautions (Done)       Learning Progress Summary            Patient Acceptance, E,D, VU,DU by  at 7/15/2025 1253    Acceptance, E,D, VU,DU by  at 7/14/2025 1504   Family Acceptance, MUNIRA,ROWAN, LAUREL,DU by NABIL at 7/15/2025 8954                       Point: Body mechanics (Done)       Learning Progress Summary            Patient Acceptance, E,D, VU,DU by  at 7/15/2025 1253    Acceptance, E,D, VU,DU by  at 7/14/2025 1504   Family Acceptance, E,D, VU,DU by  at 7/15/2025 1253                                      User Key       Initials Effective Dates Name Provider Type Discipline     10/08/24 -  Joselyn Romero, OTR/L Occupational Therapist OT                  OT Recommendation and Plan  Planned Therapy Interventions (OT): transfer/mobility retraining, strengthening exercise, patient/caregiver education/training, occupation/activity based interventions, functional balance retraining, activity tolerance training, BADL retraining  Therapy Frequency (OT): 5 times/wk  Plan of Care Review  Plan of Care Reviewed With: patient  Progress: improving  Outcome Evaluation: OT tx completed. A&O x4, family present in room. Pt completed bed mobility using logrolling technique w/ SBA and VCs. Pt sits EOB, LSO donned and pt completes t/f to FWW w/ CGA and completes xl mob to Br to complete toileting w/ SBA. Pt then demonstrates ability to complete fxl mob w/ FWW household distance improving balance and stride w/ time, requiring CGA. LSO adjusted in standing and education provided on possible need for tightening when standing for improved support. Pt left in recliner w/ call light, family in room. Cont OT POC. Rec d/c home w/ assist     Time Calculation:         Time Calculation- OT       Row Name 07/15/25 0940             Time Calculation- OT    OT Start Time 0940  -      OT Stop Time 1018  -      OT Time Calculation (min) 38 min  -      Total Timed Code Minutes- OT 38 minute(s)  -      OT Received On 07/15/25  -         Timed Charges    37827 - OT Therapeutic Activity Minutes 28  -KP      76127 - OT Self Care/Mgmt Minutes 10  -KP         Total Minutes    Timed Charges Total Minutes 38  -KP       Total Minutes 38  -KP                User Key  (r) = Recorded  By, (t) = Taken By, (c) = Cosigned By      Initials Name Provider Type    Joselyn Castrejon OTR/L Occupational Therapist                  Therapy Charges for Today       Code Description Service Date Service Provider Modifiers Qty    27063223170 HC OT EVAL LOW COMPLEXITY 4 7/14/2025 Joselyn Romero OTR/L GO 1    42551883417 HC OT THERAPEUTIC ACT EA 15 MIN 7/15/2025 Joselyn Romero OTR/L GO 2    83489675926 HC OT SELF CARE/MGMT/TRAIN EA 15 MIN 7/15/2025 Joselyn Romero OTR/L GO 1                 Joselyn Romero OTR/REYES  7/15/2025

## 2025-07-15 NOTE — PLAN OF CARE
Goal Outcome Evaluation:              Outcome Evaluation: AOx4, VSS on ETCO2, C/o pain, PCA pump is use and muscle relaxer given. Dressing reinforced on R-side. Denies n/t only states being uncomfortable. FC DC in AM, DTV. SCDs on. Tele on. Patient allowed some turns. Safety precautions maintained, call light within reach at all times.

## 2025-07-15 NOTE — PLAN OF CARE
Goal Outcome Evaluation:  Plan of Care Reviewed With: (P) patient, family, spouse        Progress: (P) no change  Outcome Evaluation: (P) PT hallie completed s/p back sx. Pt is AOx4, seen in fowlers w/ family at bedside, and reported experiecing pain from moving wrong prior to therapy entering the room. Pt performed bed mob w/ VC,TC & SBA and was educated on log rolling to maintain spinal precautions. Pt demonstrated all sitting balance while EOB with supervision and did not report and dizziness or SOA. Pt reported BLE sensation is intact and symmetrical. Pt performed sts w/ min A & FWW. Pt demonstrated all standing balance w/ CGA & FW. BLE mm strength demonstrated functionally grossly at 4+/5. Pt amb 150' w/ CGA & FWW with decreased step and stride length, occasional buckling of bilat knees, and decreased confidence w/ amb. Pt did not wish to attempt the steps today but was open to trying at a later time, and he was educated that is a safety concern before a d/c home. Pt performed stand<>sit w/ SBA & VC. Skilled therapy is indicated for this pt to improve strength, endurance, and confidence w/ amb & mob. Anticipated d/c to home w/ assist when medically stable.    Anticipated Discharge Disposition (PT): (P) home with assist

## 2025-07-16 PROCEDURE — 63710000001 AMLODIPINE 10 MG TABLET: Performed by: NURSE PRACTITIONER

## 2025-07-16 PROCEDURE — 99024 POSTOP FOLLOW-UP VISIT: CPT | Performed by: NURSE PRACTITIONER

## 2025-07-16 PROCEDURE — A9270 NON-COVERED ITEM OR SERVICE: HCPCS | Performed by: NURSE PRACTITIONER

## 2025-07-16 PROCEDURE — 63710000001 OXYCODONE-ACETAMINOPHEN 7.5-325 MG TABLET: Performed by: NURSE PRACTITIONER

## 2025-07-16 PROCEDURE — 97116 GAIT TRAINING THERAPY: CPT

## 2025-07-16 PROCEDURE — 63710000001 ATORVASTATIN 40 MG TABLET: Performed by: NURSE PRACTITIONER

## 2025-07-16 PROCEDURE — 63710000001 LOSARTAN 50 MG TABLET: Performed by: NURSE PRACTITIONER

## 2025-07-16 PROCEDURE — 63710000001 LEVOTHYROXINE 25 MCG TABLET: Performed by: NURSE PRACTITIONER

## 2025-07-16 PROCEDURE — 63710000001 SENNOSIDES-DOCUSATE 8.6-50 MG TABLET: Performed by: NURSE PRACTITIONER

## 2025-07-16 PROCEDURE — 63710000001 HYDROCHLOROTHIAZIDE 25 MG TABLET: Performed by: NURSE PRACTITIONER

## 2025-07-16 PROCEDURE — 63710000001 GABAPENTIN 300 MG CAPSULE: Performed by: NURSE PRACTITIONER

## 2025-07-16 PROCEDURE — 97535 SELF CARE MNGMENT TRAINING: CPT

## 2025-07-16 PROCEDURE — 63710000001 TAMSULOSIN 0.4 MG CAPSULE: Performed by: NURSE PRACTITIONER

## 2025-07-16 RX ORDER — TAMSULOSIN HYDROCHLORIDE 0.4 MG/1
0.4 CAPSULE ORAL DAILY
Status: DISCONTINUED | OUTPATIENT
Start: 2025-07-16 | End: 2025-07-17 | Stop reason: HOSPADM

## 2025-07-16 RX ADMIN — TIMOLOL MALEATE 1 DROP: 5 SOLUTION/ DROPS OPHTHALMIC at 08:30

## 2025-07-16 RX ADMIN — TIMOLOL MALEATE 1 DROP: 5 SOLUTION/ DROPS OPHTHALMIC at 20:12

## 2025-07-16 RX ADMIN — AMLODIPINE BESYLATE 10 MG: 10 TABLET ORAL at 08:30

## 2025-07-16 RX ADMIN — GABAPENTIN 600 MG: 300 CAPSULE ORAL at 20:12

## 2025-07-16 RX ADMIN — OXYCODONE HYDROCHLORIDE AND ACETAMINOPHEN 1 TABLET: 7.5; 325 TABLET ORAL at 06:49

## 2025-07-16 RX ADMIN — GABAPENTIN 600 MG: 300 CAPSULE ORAL at 16:11

## 2025-07-16 RX ADMIN — TAMSULOSIN HYDROCHLORIDE 0.4 MG: 0.4 CAPSULE ORAL at 08:30

## 2025-07-16 RX ADMIN — ATORVASTATIN CALCIUM 40 MG: 40 TABLET, FILM COATED ORAL at 08:30

## 2025-07-16 RX ADMIN — Medication 3 ML: at 08:30

## 2025-07-16 RX ADMIN — Medication 3 ML: at 20:12

## 2025-07-16 RX ADMIN — LOSARTAN POTASSIUM 100 MG: 50 TABLET, FILM COATED ORAL at 08:30

## 2025-07-16 RX ADMIN — OXYCODONE HYDROCHLORIDE AND ACETAMINOPHEN 1 TABLET: 7.5; 325 TABLET ORAL at 23:03

## 2025-07-16 RX ADMIN — LEVOTHYROXINE SODIUM 25 MCG: 0.03 TABLET ORAL at 05:16

## 2025-07-16 RX ADMIN — HYDROCHLOROTHIAZIDE 12.5 MG: 25 TABLET ORAL at 08:30

## 2025-07-16 RX ADMIN — SENNOSIDES, DOCUSATE SODIUM 2 TABLET: 50; 8.6 TABLET, FILM COATED ORAL at 08:30

## 2025-07-16 RX ADMIN — LATANOPROST 1 DROP: 50 SOLUTION OPHTHALMIC at 20:12

## 2025-07-16 RX ADMIN — SENNOSIDES, DOCUSATE SODIUM 2 TABLET: 50; 8.6 TABLET, FILM COATED ORAL at 20:12

## 2025-07-16 RX ADMIN — GABAPENTIN 600 MG: 300 CAPSULE ORAL at 05:16

## 2025-07-16 RX ADMIN — OXYCODONE HYDROCHLORIDE AND ACETAMINOPHEN 1 TABLET: 7.5; 325 TABLET ORAL at 17:42

## 2025-07-16 RX ADMIN — OXYCODONE HYDROCHLORIDE AND ACETAMINOPHEN 1 TABLET: 7.5; 325 TABLET ORAL at 10:44

## 2025-07-16 NOTE — PLAN OF CARE
Goal Outcome Evaluation:  Plan of Care Reviewed With: patient        Progress: improving  Outcome Evaluation: OT tx completed.  Pt up in chair, c/o 4/5 lumbar pain.  Transferred from chair with Santi and amb to BR with CGA and rw.  Pt completed oral hygiene and toileting with CGA standing at sink and standing at commode respectively.  Pt able to void after some problems with retention, per RN.  RN notified.  Pt amb back to bed with CGA and transferred stand>sit with Santi.  Difficulty with eccentric control during transfer.  Educated pt on benefit of supportive, tall chairs/furniture to ensure safe and independent transfers.  Pt returned to supine in bed with modA using log roll method.  He also needed significant verbal and tactile cueing for bed mobility.  OT will continue to treat. Recommend continued rehab at discharge.    Anticipated Discharge Disposition (OT): sub acute care setting

## 2025-07-16 NOTE — THERAPY TREATMENT NOTE
Acute Care - Physical Therapy Treatment Note  [facility]     Patient Name: Jaime Mckeon  : 1950  MRN: [id number]  Today's Date: 2025      Visit Dx:     ICD-10-CM ICD-9-CM   1. Impaired mobility [Z74.09]  Z74.09 799.89   2. Lumbar stenosis with neurogenic claudication  M48.062 724.03   3. Degeneration of intervertebral disc of lumbar region with discogenic back pain and lower extremity pain  M51.362 722.52     Patient Active Problem List   Diagnosis    Encounter for screening for malignant neoplasm of colon    Stroke    Cerebrovascular accident (CVA), unspecified mechanism    Essential hypertension    Hx of adenomatous colonic polyps    Lumbar stenosis with neurogenic claudication    Degeneration of intervertebral disc of lumbar region with discogenic back pain and lower extremity pain    Lumbar degenerative disc disease     Past Medical History:   Diagnosis Date    Allergic rhinitis     Arthritis     Back pain 10/2024    BPH (benign prostatic hyperplasia)     Claustrophobia     Hemorrhoid     History of cardioembolic cerebrovascular accident (CVA)     History of transfusion     Has had a blood transfusion, in     Hyperlipidemia     Hypertension     Hypothyroidism     Low back pain     Lumbosacral disc disease     Macular degeneration, 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 parekh-One 2 mm adenomatous polyp in the cecum, One 3 mm fragments of sessile serrated adenoma polyp at the hepatic flexure at 80 cm proximal to the anus,  One 6 mm adenomatous polyp at 80 cm proximal to the anus,  One 5 mm hyperplastic  polyp at 40 cm proximal to the anus - Diverticulosis in the sigmoid colon.    COLONOSCOPY N/A 2025    Procedure: COLONOSCOPY WITH ANESTHESIA;  Surgeon: Elan Parekh MD;   Location:  [facility];  Service: Gastroenterology;  Laterality: N/A;  pre polyp hx  post diverticulosis      FINGER SURGERY      CYST REMOVAL X3    LUMBAR LAMINECTOMY WITH FUSION Left 7/14/2025    Procedure: LUMBAR LEFT DISCECTOMY, HEMILAMINECTOMY, FACETECTOMY L23,34,45 TRANSFORAMINAL INTERBODY FUSION WITH NEURO ROBOT;  Surgeon: Corby Edwards MD;  Location:  [facility];  Service: Robotics - Neuro;  Laterality: Left;    VASECTOMY Bilateral      PT Assessment (Last 12 Hours)       PT Evaluation and Treatment       Row Name 07/16/25 1430 07/16/25 0902       Physical Therapy Time and Intention    Subjective Information complains of;pain  -COLE complains of;pain  -COLE    Document Type therapy note (daily note)  -COLE therapy note (daily note)  -COLE    Mode of Treatment physical therapy  -COLE physical therapy  -COLE      Row Name 07/16/25 1430 07/16/25 0902       General Information    Existing Precautions/Restrictions fall;brace worn when out of bed;LSO;spinal  -COLE fall;brace worn when out of bed;LSO;spinal  -COLE      Row Name 07/16/25 1430 07/16/25 0902       Pain    Pretreatment Pain Rating 5/10  -COLE 5/10  -COLE    Posttreatment Pain Rating 5/10  -COLE 6/10  -COLE    Pain Location back  -COLE back  -COLE    Pain Side/Orientation lower  -COLE generalized  -COLE    Pain Management Interventions exercise or physical activity utilized  -COLE exercise or physical activity utilized  -COLE    Response to Pain Interventions activity participation with tolerable pain  -COLE activity participation with tolerable pain  -COLE      Row Name 07/16/25 1430 07/16/25 0902       Bed Mobility    Sidelying-Sit [place] (Bed Mobility) --  in the bathroom  -COLE verbal cues;minimum assist (75% patient effort)  -COLE    Sit-Sidelying [place] (Bed Mobility) verbal cues;moderate assist (50% patient effort)  -COLE --      Row Name 07/16/25 1430 07/16/25 0902       Sit-Stand Transfer    Sit-Stand [place] (Transfers) verbal cues;minimum assist (75%  patient effort)  -COLE verbal cues;contact guard;minimum assist (75% patient effort)  -COLE    Assistive Device (Sit-Stand Transfers) walker, front-wheeled  -COLE walker, front-wheeled  -COLE    Comment, (Sit-Stand Transfer) -- bed elevated  -COLE      Row Name 07/16/25 1430 07/16/25 0902       Stand-Sit Transfer    Stand-Sit [place] (Transfers) verbal cues;minimum assist (75% patient effort)  -COLE verbal cues;contact guard  -COLE    Assistive Device (Stand-Sit Transfers) walker, front-wheeled  -COLE walker, front-wheeled  -COLE      Row Name 07/16/25 1430 07/16/25 0902       Gait/Stairs (Locomotion)    [place] Level (Gait) verbal cues;contact guard  -COLE verbal cues;contact guard  -COLE    Assistive Device (Gait) walker, front-wheeled  -COLE walker, front-wheeled  -COLE    Distance in Feet (Gait) 150  -COLE 120  -COLE    Deviations/Abnormal Patterns (Gait) base of support, narrow;gait speed decreased;stride length decreased  -COLE base of support, narrow;gait speed decreased;antalgic;stride length decreased  -COLE    Bilateral Gait Deviations forward flexed posture  -COLE --      Row Name             Wound 07/14/25 0811 lumbar spine Surgical    Wound - Properties Group Placement Date: 07/14/25  -JBA Placement Time: 0811 -JBA Location: lumbar spine  -JBA Primary Wound Type: Surgical  -JBA    Retired Wound - Properties Group Placement Date: 07/14/25  -JBA Placement Time: 0811 -JBA Location: lumbar spine  -JBA    Retired Wound - Properties Group Placement Date: 07/14/25  -JBA Placement Time: 0811 -JBA Location: lumbar spine  -JBA    Retired Wound - Properties Group Date first assessed: 07/14/25  -JBA Time first assessed: 0811 -JBA Location: lumbar spine  -JBA      Row Name 07/16/25 1430 07/16/25 0902       Positioning and Restraints    Pre-Treatment Position bathroom  -COLE in bed  -COLE    Post Treatment Position bed  -COLE chair  -COLE    In Bed fowlers;call light within reach;encouraged to call for assist;side rails up x2  -COLE --    In Chair  -- sitting;call light within reach;encouraged to call for assist;with family/caregiver  -COLE              User Key  (r) = Recorded By, (t) = Taken By, (c) = Cosigned By      Initials Name Provider Type    Charanjit Hernandez, PTA Physical Therapist Assistant    Esteban Wall, RN Registered Nurse                    Physical Therapy Education       Title: PT OT SLP Therapies (Done)       Topic: Physical Therapy (Done)       Point: Mobility training (Done)       Learning Progress Summary            Patient Acceptance, E, NR,VU by AD at 7/15/2025 1042    Comment: donning and doffing brace, spinal precautions, time to wear brace, POC, d/c plans, safety conerns prior to d/c needing to be addressed   Family Acceptance, E, NR,VU by AD at 7/15/2025 1042    Comment: donning and doffing brace, spinal precautions, time to wear brace, POC, d/c plans, safety conerns prior to d/c needing to be addressed   Significant Other Acceptance, E, NR,VU by AD at 7/15/2025 1042    Comment: donning and doffing brace, spinal precautions, time to wear brace, POC, d/c plans, safety conerns prior to d/c needing to be addressed                      Point: Home exercise program (Done)       Learning Progress Summary            Patient Acceptance, E, NR,VU by AD at 7/15/2025 1042    Comment: donning and doffing brace, spinal precautions, time to wear brace, POC, d/c plans, safety conerns prior to d/c needing to be addressed   Family Acceptance, E, NR,VU by AD at 7/15/2025 1042    Comment: donning and doffing brace, spinal precautions, time to wear brace, POC, d/c plans, safety conerns prior to d/c needing to be addressed   Significant Other Acceptance, E, NR,VU by AD at 7/15/2025 1042    Comment: donning and doffing brace, spinal precautions, time to wear brace, POC, d/c plans, safety conerns prior to d/c needing to be addressed                      Point: Body mechanics (Done)       Learning Progress Summary            Patient Acceptance, E,  NR,VU by AD at 7/15/2025 1042    Comment: donning and doffing brace, spinal precautions, time to wear brace, POC, d/c plans, safety conerns prior to d/c needing to be addressed   Family Acceptance, E, NR,VU by AD at 7/15/2025 1042    Comment: donning and doffing brace, spinal precautions, time to wear brace, POC, d/c plans, safety conerns prior to d/c needing to be addressed   Significant Other Acceptance, E, NR,VU by AD at 7/15/2025 1042    Comment: donning and doffing brace, spinal precautions, time to wear brace, POC, d/c plans, safety conerns prior to d/c needing to be addressed                      Point: Precautions (Done)       Learning Progress Summary            Patient Acceptance, E, NR,VU by AD at 7/15/2025 1042    Comment: donning and doffing brace, spinal precautions, time to wear brace, POC, d/c plans, safety conerns prior to d/c needing to be addressed   Family Acceptance, E, NR,VU by AD at 7/15/2025 1042    Comment: donning and doffing brace, spinal precautions, time to wear brace, POC, d/c plans, safety conerns prior to d/c needing to be addressed   Significant Other Acceptance, E, NR,VU by AD at 7/15/2025 1042    Comment: donning and doffing brace, spinal precautions, time to wear brace, POC, d/c plans, safety conerns prior to d/c needing to be addressed                                      User Key       Initials Effective Dates Name Provider Type Discipline    AD 04/21/25 -  Ramon Fowler, PT Student PT Student PT                  PT Recommendation and Plan     Progress: improving  Outcome Evaluation: Pt was in bed, agreed to therapy.  Required min assist to transfer sidelying to sitting using the bed rail.  Transferred sit to stand with CGA/min assist with bed slightly elevated.  Amb 120' with RWX and CGA/min assist. Educated on spinal precautions and use of LSO   Outcome Measures       Row Name 07/16/25 1145 07/16/25 0902          How much help from another person do you currently need...     Turning from your back to your side while in flat bed without using bedrails? -- 3  -COLE     Moving from lying on back to sitting on the side of a flat bed without bedrails? -- 3  -COLE     Moving to and from a bed to a chair (including a wheelchair)? -- 3  -COLE     Standing up from a chair using your arms (e.g., wheelchair, bedside chair)? -- 3  -COLE     Climbing 3-5 steps with a railing? -- 3  -COLE     To walk in hospital room? -- 3  -COLE     AM-PAC 6 Clicks Score (PT) -- 18  -COLE        How much help from another is currently needed...    Putting on and taking off regular lower body clothing? 2  -AC --     Bathing (including washing, rinsing, and drying) 3  -AC --     Toileting (which includes using toilet bed pan or urinal) 3  -AC --     Putting on and taking off regular upper body clothing 3  -AC --     Taking care of personal grooming (such as brushing teeth) 3  -AC --     Eating meals 4  -AC --     AM-PAC 6 Clicks Score (OT) 18  -AC --        Functional Assessment    Outcome Measure Options AM-PAC 6 Clicks Daily Activity (OT)  -AC AM-PAC 6 Clicks Basic Mobility (PT)  -COLE               User Key  (r) = Recorded By, (t) = Taken By, (c) = Cosigned By      Initials Name Provider Type    Yannick Jones, OTR/L, CNT Occupational Therapist    Charanjit Hernandez, PTA Physical Therapist Assistant                     Time Calculation:    PT Charges       Row Name 07/16/25 1430 07/16/25 0902          Time Calculation    Start Time 1430  -COLE 0902  -COLE     Stop Time 1446  -COLE 0925  -COLE     Time Calculation (min) 16 min  -COLE 23 min  -COLE     PT Received On 07/16/25  -COLE 07/16/25  -COLE        Time Calculation- PT    Total Timed Code Minutes- PT 16 minute(s)  -COLE 23 minute(s)  -COLE        Timed Charges    67961 - Gait Training Minutes  16  -COLE 23  -COLE        Total Minutes    Timed Charges Total Minutes 16  -COLE 23  -COLE      Total Minutes 16  -COLE 23  -COLE               User Key  (r) = Recorded By, (t) = Taken By, (c) = Cosigned By       Initials Name Provider Type    COLE Charanjit Ellington PTA Physical Therapist Assistant                  Therapy Charges for Today       Code Description Service Date Service Provider Modifiers Qty    49107615515 HC GAIT TRAINING EA 15 MIN 7/16/2025 Charanjit Ellington, ANA LAURA GP 2    25595745766 HC GAIT TRAINING EA 15 MIN 7/16/2025 Charanjit Ellington PTA GP 1            PT G-Codes  Outcome Measure Options: AM-PAC 6 Clicks Daily Activity (OT)  AM-PAC 6 Clicks Score (PT): 18  AM-PAC 6 Clicks Score (OT): 18    Charanjit Ellington PTA  7/16/2025

## 2025-07-16 NOTE — PLAN OF CARE
Goal Outcome Evaluation:  Plan of Care Reviewed With: patient        Progress: no change  Outcome Evaluation: A&Ox4. Room air. Up with assistance with walker and LSO brace. I/O cathed this evening due to urine retention, Tolerated poorly, c/o pain. Slight blood with very small clot noted in urine due to trauma. C/o pain, prn meds given with relief. Family at bedside most of shift. Call light within reach.

## 2025-07-16 NOTE — THERAPY TREATMENT NOTE
Acute Care - Occupational Therapy Treatment Note  [facility]     Patient Name: Jaime Mckeon  : 1950  MRN: [id number]  Today's Date: 2025             Admit Date: 2025       ICD-10-CM ICD-9-CM   1. Impaired mobility [Z74.09]  Z74.09 799.89   2. Lumbar stenosis with neurogenic claudication  M48.062 724.03   3. Degeneration of intervertebral disc of lumbar region with discogenic back pain and lower extremity pain  M51.362 722.52     Patient Active Problem List   Diagnosis    Encounter for screening for malignant neoplasm of colon    Stroke    Cerebrovascular accident (CVA), unspecified mechanism    Essential hypertension    Hx of adenomatous colonic polyps    Lumbar stenosis with neurogenic claudication    Degeneration of intervertebral disc of lumbar region with discogenic back pain and lower extremity pain    Lumbar degenerative disc disease     Past Medical History:   Diagnosis Date    Allergic rhinitis     Arthritis     Back pain 10/2024    BPH (benign prostatic hyperplasia)     Claustrophobia     Hemorrhoid     History of cardioembolic cerebrovascular accident (CVA)     History of transfusion     Has had a blood transfusion, in     Hyperlipidemia     Hypertension     Hypothyroidism     Low back pain     Lumbosacral disc disease     Macular degeneration, 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 parekh-One 2 mm adenomatous polyp in the cecum, One 3 mm fragments of sessile serrated adenoma polyp at the hepatic flexure at 80 cm proximal to the anus,  One 6 mm adenomatous polyp at 80 cm proximal to the anus,  One 5 mm hyperplastic  polyp at 40 cm proximal to the anus - Diverticulosis in the sigmoid colon.    COLONOSCOPY N/A 2025    Procedure: COLONOSCOPY WITH ANESTHESIA;   Surgeon: Elan Davis MD;  Location: [facility] ENDOSCOPY;  Service: Gastroenterology;  Laterality: N/A;  pre polyp hx  post diverticulosis      FINGER SURGERY      CYST REMOVAL X3    LUMBAR LAMINECTOMY WITH FUSION Left 7/14/2025    Procedure: LUMBAR LEFT DISCECTOMY, HEMILAMINECTOMY, FACETECTOMY L23,34,45 TRANSFORAMINAL INTERBODY FUSION WITH NEURO ROBOT;  Surgeon: Corby Edwards MD;  Location: [facility] OR;  Service: Robotics - Neuro;  Laterality: Left;    VASECTOMY Bilateral          OT ASSESSMENT FLOWSHEET (Last 12 Hours)       OT Evaluation and Treatment       Row Name 07/16/25 1145                   OT Time and Intention    Subjective Information complains of;pain  -AC        Document Type therapy note (daily note)  -AC        Mode of Treatment occupational therapy  -AC           General Information    Existing Precautions/Restrictions fall;brace worn when out of bed;LSO;spinal  -AC           Pain Assessment    Pretreatment Pain Rating 4/10  -AC        Posttreatment Pain Rating 4/10  -AC        Pain Location back  -AC        Pain Side/Orientation lower  -AC        Pain Management Interventions exercise or physical activity utilized;premedicated for activity  -AC        Response to Pain Interventions activity participation with tolerable pain  -AC           Cognition    Personal Safety Interventions fall prevention program maintained;gait belt;muscle strengthening facilitated;nonskid shoes/slippers when out of bed;supervised activity  -AC           Activities of Daily Living    BADL Assessment/Intervention grooming;toileting  -AC           Grooming Assessment/Training    [place] Level (Grooming) wash face, hands;contact guard assist  -AC        Position (Grooming) sink side  -AC           Toileting Assessment/Training    [place] Level (Toileting) adjust/manage clothing;toileting skills;contact guard assist  -AC        Assistive Devices (Toileting) commode  -AC        Position (Toileting)  supported standing  -AC           Bed Mobility    Bed Mobility sit-sidelying;rolling left  -AC        Rolling Left [place] (Bed Mobility) standby assist;verbal cues  -AC        Sit-Sidelying [place] (Bed Mobility) moderate assist (50% patient effort);verbal cues  -AC        Assistive Device (Bed Mobility) bed rails  -AC           Functional Mobility    Functional Mobility- Ind. Level contact guard assist  -AC        Functional Mobility- Device walker, front-wheeled  -AC        Functional Mobility- Comment to BR, to bed  -AC           Sit-Stand Transfer    Sit-Stand [place] (Transfers) minimum assist (75% patient effort);verbal cues  -AC        Assistive Device (Sit-Stand Transfers) walker, front-wheeled  -AC           Stand-Sit Transfer    Stand-Sit [place] (Transfers) minimum assist (75% patient effort);verbal cues  -AC        Assistive Device (Stand-Sit Transfers) walker, front-wheeled  -AC           Wound 07/14/25 0811 lumbar spine Surgical    Wound - Properties Group Placement Date: 07/14/25  -COLE Placement Time: 0811 -COLE Location: lumbar spine  -COLE Primary Wound Type: Surgical  -COLE    Retired Wound - Properties Group Placement Date: 07/14/25  -COLE Placement Time: 0811 -COLE Location: lumbar spine  -COLE    Retired Wound - Properties Group Placement Date: 07/14/25  -COLE Placement Time: 0811  -COLE Location: lumbar spine  -COLE    Retired Wound - Properties Group Date first assessed: 07/14/25  -COLE Time first assessed: 0811 -COLE Location: lumbar spine  -COLE       Plan of Care Review    Plan of Care Reviewed With patient  -AC        Progress improving  -AC        Outcome Evaluation OT tx completed.  Pt up in chair, c/o 4/5 lumbar pain.  Transferred from chair with Santi and amb to BR with CGA and rw.  Pt completed oral hygiene and toileting with CGA standing at sink and standing at commode respectively.  Pt able to void after some problems with retention, per RN.  RN notified.  Pt amb back to bed with CGA  and transferred stand>sit with Santi.  Difficulty with eccentric control during transfer.  Educated pt on benefit of supportive, tall chairs/furniture to ensure safe and independent transfers.  Pt returned to supine in bed with modA using log roll method.  He also needed significant verbal and tactile cueing for bed mobility.  OT will continue to treat. Recommend continued rehab at discharge.  -AC           Positioning and Restraints    Pre-Treatment Position sitting in chair/recliner  -AC        Post Treatment Position bed  -AC        In Bed fowlers;call light within reach;encouraged to call for assist;with family/caregiver;side rails up x2;SCD pump applied  -AC           Therapy Plan Review/Discharge Plan (OT)    Anticipated Discharge Disposition (OT) sub acute care setting  -AC                  User Key  (r) = Recorded By, (t) = Taken By, (c) = Cosigned By      Initials Name Effective Dates    Yannick Jones, OTR/L, CNT 02/03/23 -     Esteban Patel, RN 02/17/22 -                      Occupational Therapy Education       Title: PT OT SLP Therapies (Done)       Topic: Occupational Therapy (Done)       Point: ADL training (Done)       Learning Progress Summary            Patient Acceptance, E,D, VU,NR by  at 7/16/2025 1320    Acceptance, E,D, VU,DU by  at 7/15/2025 1253    Acceptance, E,D, VU,DU by  at 7/14/2025 1504   Family Acceptance, E,D, VU,NR by  at 7/16/2025 1320    Acceptance, E,D, VU,DU by  at 7/15/2025 1253                      Point: Precautions (Done)       Learning Progress Summary            Patient Acceptance, E,D, VU,NR by  at 7/16/2025 1320    Acceptance, E,D, VU,DU by  at 7/15/2025 1253    Acceptance, E,D, VU,DU by  at 7/14/2025 1504   Family Acceptance, E,D, VU,NR by  at 7/16/2025 1320    Acceptance, E,D, VU,DU by  at 7/15/2025 1253                      Point: Body mechanics (Done)       Learning Progress Summary            Patient Acceptance, E,D, VU,NR by  at  7/16/2025 1320    Acceptance, E,D, VU,DU by  at 7/15/2025 1253    Acceptance, E,D, VU,DU by  at 7/14/2025 1504   Family Acceptance, E,D, VU,NR by  at 7/16/2025 1320    Acceptance, E,D, VU,DU by  at 7/15/2025 1253                                      User Key       Initials Effective Dates Name Provider Type Discipline     02/03/23 -  Yannick Ko, OTR/L, JON Occupational Therapist OT     10/08/24 -  Joselyn Romero OTR/L Occupational Therapist OT                      OT Recommendation and Plan     Plan of Care Review  Plan of Care Reviewed With: patient  Progress: improving  Outcome Evaluation: OT tx completed.  Pt up in chair, c/o 4/5 lumbar pain.  Transferred from chair with Santi and amb to BR with CGA and rw.  Pt completed oral hygiene and toileting with CGA standing at sink and standing at commode respectively.  Pt able to void after some problems with retention, per RN.  RN notified.  Pt amb back to bed with CGA and transferred stand>sit with Santi.  Difficulty with eccentric control during transfer.  Educated pt on benefit of supportive, tall chairs/furniture to ensure safe and independent transfers.  Pt returned to supine in bed with modA using log roll method.  He also needed significant verbal and tactile cueing for bed mobility.  OT will continue to treat. Recommend continued rehab at discharge.  Plan of Care Reviewed With: patient  Outcome Evaluation: OT tx completed.  Pt up in chair, c/o 4/5 lumbar pain.  Transferred from chair with Santi and amb to BR with CGA and rw.  Pt completed oral hygiene and toileting with CGA standing at sink and standing at commode respectively.  Pt able to void after some problems with retention, per RN.  RN notified.  Pt amb back to bed with CGA and transferred stand>sit with Santi.  Difficulty with eccentric control during transfer.  Educated pt on benefit of supportive, tall chairs/furniture to ensure safe and independent transfers.  Pt returned to supine in  bed with modA using log roll method.  He also needed significant verbal and tactile cueing for bed mobility.  OT will continue to treat. Recommend continued rehab at discharge.     Outcome Measures       Row Name 07/16/25 1145 07/16/25 0902          How much help from another person do you currently need...    Turning from your back to your side while in flat bed without using bedrails? -- 3  -COLE     Moving from lying on back to sitting on the side of a flat bed without bedrails? -- 3  -COLE     Moving to and from a bed to a chair (including a wheelchair)? -- 3  -COLE     Standing up from a chair using your arms (e.g., wheelchair, bedside chair)? -- 3  -COLE     Climbing 3-5 steps with a railing? -- 3  -COLE     To walk in hospital room? -- 3  -COLE     AM-PAC 6 Clicks Score (PT) -- 18  -COLE        How much help from another is currently needed...    Putting on and taking off regular lower body clothing? 2  -AC --     Bathing (including washing, rinsing, and drying) 3  -AC --     Toileting (which includes using toilet bed pan or urinal) 3  -AC --     Putting on and taking off regular upper body clothing 3  -AC --     Taking care of personal grooming (such as brushing teeth) 3  -AC --     Eating meals 4  -AC --     AM-PAC 6 Clicks Score (OT) 18  -AC --        Functional Assessment    Outcome Measure Options AM-PAC 6 Clicks Daily Activity (OT)  -AC AM-PAC 6 Clicks Basic Mobility (PT)  -COLE               User Key  (r) = Recorded By, (t) = Taken By, (c) = Cosigned By      Initials Name Provider Type    AC Yannick Ko, OTR/L, CNT Occupational Therapist    Charanjit Hernandez, PTA Physical Therapist Assistant                    Time Calculation:    Time Calculation- OT       Row Name 07/16/25 1215 07/16/25 0902          Time Calculation- OT    OT Start Time 1145  -AC --     OT Stop Time 1215  -AC --     OT Time Calculation (min) 30 min  -AC --     Total Timed Code Minutes- OT 30 minute(s)  - --     OT Received On 07/16/25   -AC --        Timed Charges    42681 - Gait Training Minutes  -- 23  -COLE     61483 - OT Self Care/Mgmt Minutes 30  -AC --        Total Minutes    Timed Charges Total Minutes 30  -AC 23  -COLE      Total Minutes 30  -AC 23  -COLE               User Key  (r) = Recorded By, (t) = Taken By, (c) = Cosigned By      Initials Name Provider Type    AC Yannick Ko, OTR/L, CNT Occupational Therapist    Charanjit Hernandez, PTA Physical Therapist Assistant                  Therapy Charges for Today       Code Description Service Date Service Provider Modifiers Qty    19625059978 HC OT SELF CARE/MGMT/TRAIN EA 15 MIN 7/16/2025 Yannick Ko OTR/L, JON GO 2                 RUBA Gooden/L, CNT  7/16/2025

## 2025-07-16 NOTE — THERAPY TREATMENT NOTE
Acute Care - Physical Therapy Treatment Note  [facility]     Patient Name: Jaime Mckeon  : 1950  MRN: [id number]  Today's Date: 2025      Visit Dx:     ICD-10-CM ICD-9-CM   1. Impaired mobility [Z74.09]  Z74.09 799.89   2. Lumbar stenosis with neurogenic claudication  M48.062 724.03   3. Degeneration of intervertebral disc of lumbar region with discogenic back pain and lower extremity pain  M51.362 722.52     Patient Active Problem List   Diagnosis    Encounter for screening for malignant neoplasm of colon    Stroke    Cerebrovascular accident (CVA), unspecified mechanism    Essential hypertension    Hx of adenomatous colonic polyps    Lumbar stenosis with neurogenic claudication    Degeneration of intervertebral disc of lumbar region with discogenic back pain and lower extremity pain    Lumbar degenerative disc disease     Past Medical History:   Diagnosis Date    Allergic rhinitis     Arthritis     Back pain 10/2024    BPH (benign prostatic hyperplasia)     Claustrophobia     Hemorrhoid     History of cardioembolic cerebrovascular accident (CVA)     History of transfusion     Has had a blood transfusion, in     Hyperlipidemia     Hypertension     Hypothyroidism     Low back pain     Lumbosacral disc disease     Macular degeneration, 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 parekh-One 2 mm adenomatous polyp in the cecum, One 3 mm fragments of sessile serrated adenoma polyp at the hepatic flexure at 80 cm proximal to the anus,  One 6 mm adenomatous polyp at 80 cm proximal to the anus,  One 5 mm hyperplastic  polyp at 40 cm proximal to the anus - Diverticulosis in the sigmoid colon.    COLONOSCOPY N/A 2025    Procedure: COLONOSCOPY WITH ANESTHESIA;  Surgeon: Elan Parekh MD;   Location:  [facility] ENDOSCOPY;  Service: Gastroenterology;  Laterality: N/A;  pre polyp hx  post diverticulosis      FINGER SURGERY      CYST REMOVAL X3    LUMBAR LAMINECTOMY WITH FUSION Left 7/14/2025    Procedure: LUMBAR LEFT DISCECTOMY, HEMILAMINECTOMY, FACETECTOMY L23,34,45 TRANSFORAMINAL INTERBODY FUSION WITH NEURO ROBOT;  Surgeon: Corby Edwards MD;  Location:  [facility] OR;  Service: Robotics - Neuro;  Laterality: Left;    VASECTOMY Bilateral      PT Assessment (Last 12 Hours)       PT Evaluation and Treatment       Row Name 07/16/25 0902          Physical Therapy Time and Intention    Subjective Information complains of;pain  -     Document Type therapy note (daily note)  -     Mode of Treatment physical therapy  -       Row Name 07/16/25 0902          General Information    Existing Precautions/Restrictions fall;brace worn when out of bed;LSO;spinal  -       Row Name 07/16/25 0902          Pain    Pretreatment Pain Rating 5/10  -     Posttreatment Pain Rating 6/10  -     Pain Location back  -COLE     Pain Side/Orientation generalized  -     Pain Management Interventions exercise or physical activity utilized  -     Response to Pain Interventions activity participation with tolerable pain  -       Row Name 07/16/25 0902          Bed Mobility    Sidelying-Sit [place] (Bed Mobility) verbal cues;minimum assist (75% patient effort)  -       Row Name 07/16/25 0902          Sit-Stand Transfer    Sit-Stand [place] (Transfers) verbal cues;contact guard;minimum assist (75% patient effort)  -     Assistive Device (Sit-Stand Transfers) walker, front-wheeled  -     Comment, (Sit-Stand Transfer) bed elevated  -       Row Name 07/16/25 0902          Stand-Sit Transfer    Stand-Sit [place] (Transfers) verbal cues;contact guard  -     Assistive Device (Stand-Sit Transfers) walker, front-wheeled  -COLE       Row Name 07/16/25 0902          Gait/Stairs (Locomotion)    [place]  Level (Gait) verbal cues;contact guard  -COLE     Assistive Device (Gait) walker, front-wheeled  -COLE     Distance in Feet (Gait) 120  -COLE     Deviations/Abnormal Patterns (Gait) base of support, narrow;gait speed decreased;antalgic;stride length decreased  -COLE       Row Name             Wound 07/14/25 0811 lumbar spine Surgical    Wound - Properties Group Placement Date: 07/14/25  -JBA Placement Time: 0811 -JBA Location: lumbar spine  -JBA Primary Wound Type: Surgical  -JBA    Retired Wound - Properties Group Placement Date: 07/14/25  -JBA Placement Time: 0811 -JBA Location: lumbar spine  -JBA    Retired Wound - Properties Group Placement Date: 07/14/25  -JBA Placement Time: 0811 -JBA Location: lumbar spine  -JBA    Retired Wound - Properties Group Date first assessed: 07/14/25  -JBA Time first assessed: 0811 -JBA Location: lumbar spine  -JBA      Row Name 07/16/25 0902          Positioning and Restraints    Pre-Treatment Position in bed  -COLE     Post Treatment Position chair  -COLE     In Chair sitting;call light within reach;encouraged to call for assist;with family/caregiver  -COLE               User Key  (r) = Recorded By, (t) = Taken By, (c) = Cosigned By      Initials Name Provider Type    Charanjit Hernandez PTA Physical Therapist Assistant    Esteban Wall, RN Registered Nurse                    Physical Therapy Education       Title: PT OT SLP Therapies (Done)       Topic: Physical Therapy (Done)       Point: Mobility training (Done)       Learning Progress Summary            Patient Acceptance, E, NR,VU by AD at 7/15/2025 1042    Comment: donning and doffing brace, spinal precautions, time to wear brace, POC, d/c plans, safety conerns prior to d/c needing to be addressed   Family Acceptance, E, NR,VU by AD at 7/15/2025 1042    Comment: donning and doffing brace, spinal precautions, time to wear brace, POC, d/c plans, safety conerns prior to d/c needing to be addressed   Significant Other Acceptance,  E, NR,VU by AD at 7/15/2025 1042    Comment: donning and doffing brace, spinal precautions, time to wear brace, POC, d/c plans, safety conerns prior to d/c needing to be addressed                      Point: Home exercise program (Done)       Learning Progress Summary            Patient Acceptance, E, NR,VU by AD at 7/15/2025 1042    Comment: donning and doffing brace, spinal precautions, time to wear brace, POC, d/c plans, safety conerns prior to d/c needing to be addressed   Family Acceptance, E, NR,VU by AD at 7/15/2025 1042    Comment: donning and doffing brace, spinal precautions, time to wear brace, POC, d/c plans, safety conerns prior to d/c needing to be addressed   Significant Other Acceptance, E, NR,VU by AD at 7/15/2025 1042    Comment: donning and doffing brace, spinal precautions, time to wear brace, POC, d/c plans, safety conerns prior to d/c needing to be addressed                      Point: Body mechanics (Done)       Learning Progress Summary            Patient Acceptance, E, NR,VU by AD at 7/15/2025 1042    Comment: donning and doffing brace, spinal precautions, time to wear brace, POC, d/c plans, safety conerns prior to d/c needing to be addressed   Family Acceptance, E, NR,VU by AD at 7/15/2025 1042    Comment: donning and doffing brace, spinal precautions, time to wear brace, POC, d/c plans, safety conerns prior to d/c needing to be addressed   Significant Other Acceptance, E, NR,VU by AD at 7/15/2025 1042    Comment: donning and doffing brace, spinal precautions, time to wear brace, POC, d/c plans, safety conerns prior to d/c needing to be addressed                      Point: Precautions (Done)       Learning Progress Summary            Patient Acceptance, E, NR,VU by AD at 7/15/2025 1042    Comment: donning and doffing brace, spinal precautions, time to wear brace, POC, d/c plans, safety conerns prior to d/c needing to be addressed   Family Acceptance, E, NR,VU by AD at 7/15/2025 8570     Comment: donning and doffing brace, spinal precautions, time to wear brace, POC, d/c plans, safety conerns prior to d/c needing to be addressed   Significant Other Acceptance, E, NR,VU by AD at 7/15/2025 1042    Comment: donning and doffing brace, spinal precautions, time to wear brace, POC, d/c plans, safety conerns prior to d/c needing to be addressed                                      User Key       Initials Effective Dates Name Provider Type Discipline    AD 04/21/25 -  Ramon Fowler, PT Student PT Student PT                  PT Recommendation and Plan     Progress: improving  Outcome Evaluation: Pt was in bed, agreed to therapy.  Required min assist to transfer sidelying to sitting using the bed rail.  Transferred sit to stand with CGA/min assist with bed slightly elevated.  Amb 120' with RWX and CGA/min assist. Educated on spinal precautions and use of LSO   Outcome Measures       Row Name 07/16/25 0902             How much help from another person do you currently need...    Turning from your back to your side while in flat bed without using bedrails? 3  -COLE      Moving from lying on back to sitting on the side of a flat bed without bedrails? 3  -COLE      Moving to and from a bed to a chair (including a wheelchair)? 3  -COLE      Standing up from a chair using your arms (e.g., wheelchair, bedside chair)? 3  -COLE      Climbing 3-5 steps with a railing? 3  -COLE      To walk in hospital room? 3  -COLE      AM-PAC 6 Clicks Score (PT) 18  -COLE         Functional Assessment    Outcome Measure Options AM-PAC 6 Clicks Basic Mobility (PT)  -COLE                User Key  (r) = Recorded By, (t) = Taken By, (c) = Cosigned By      Initials Name Provider Type    COLE Charanjit Ellington, PTA Physical Therapist Assistant                     Time Calculation:    PT Charges       Row Name 07/16/25 0902             Time Calculation    Start Time 0902  -COLE      Stop Time 0925  -COLE      Time Calculation (min) 23 min  -COLE      PT Received  On 07/16/25  -COLE         Time Calculation- PT    Total Timed Code Minutes- PT 23 minute(s)  -COLE         Timed Charges    59072 - Gait Training Minutes  23  -COLE         Total Minutes    Timed Charges Total Minutes 23  -COLE       Total Minutes 23  -COLE                User Key  (r) = Recorded By, (t) = Taken By, (c) = Cosigned By      Initials Name Provider Type    Charanjit Hernandez PTA Physical Therapist Assistant                  Therapy Charges for Today       Code Description Service Date Service Provider Modifiers Qty    [id number] HC GAIT TRAINING EA 15 MIN 7/16/2025 Charanjit Ellington PTA GP 2            PT G-Codes  Outcome Measure Options: AM-PAC 6 Clicks Basic Mobility (PT)  AM-PAC 6 Clicks Score (PT): 18  AM-PAC 6 Clicks Score (OT): 19    Charanjit Ellington PTA  7/16/2025

## 2025-07-16 NOTE — PLAN OF CARE
Goal Outcome Evaluation:  Plan of Care Reviewed With: patient        Progress: improving  Outcome Evaluation: Pt was in bed, agreed to therapy.  Required min assist to transfer sidelying to sitting using the bed rail.  Transferred sit to stand with CGA/min assist with bed slightly elevated.  Amb 120' with RWX and CGA/min assist. Educated on spinal precautions and use of LSO

## 2025-07-16 NOTE — PROGRESS NOTES
"Jaime MORALEZ Mckeon  75 y.o.      Chief complaint:   Back pain status post lumbar fusion    Subjective  Patient is mobilizing with therapy.  Difficulty with urinating.  Did have to have In-N-Out cath last night with 1000 mL output.  Patient has been able to void since then on his own but does report that urine output is starting to slack off again.    Temp:  [98.1 °F (36.7 °C)-99.1 °F (37.3 °C)] 98.5 °F (36.9 °C)  Heart Rate:  [70-96] 83  Resp:  [14-18] 16  BP: (121-146)/(57-71) 121/57      Objective:  General Appearance:  Comfortable, well-appearing, in no acute distress and in pain.    Vital signs: (most recent): Blood pressure 121/57, pulse 83, temperature 98.5 °F (36.9 °C), temperature source Oral, resp. rate 16, height 181 cm (71.26\"), weight 107 kg (235 lb 3.7 oz), SpO2 96%.  Vital signs are normal.  No fever.    Output: Producing urine.    HEENT: Normal HEENT exam.    Lungs:  Normal effort and normal respiratory rate.  He is not in respiratory distress.    Chest: Symmetric chest wall expansion.   Extremities: Normal range of motion.    Skin:  Warm and dry.    Pulses: Distal pulses are intact.        Neurological Exam  Mental Status  Awake. Oriented to person, place and time. Speech is normal. Language is fluent with no aphasia. Attention and concentration are normal.    Cranial Nerves  CN I: Sense of smell is normal.  CN II: Right normal visual field. Left normal visual field.  CN III, IV, VI: Extraocular movements intact bilaterally. Pupils equal round and reactive to light bilaterally.  CN V: Facial sensation is normal.  CN VII:  Right: There is no facial weakness.  Left: There is no facial weakness.  CN XI: Shoulder shrug strength is normal.  CN XII: Tongue midline without atrophy or fasciculations.    Motor  Normal muscle bulk throughout. Normal muscle tone. Strength is 5/5 throughout all four extremities.    Sensory  Sensation is intact to light touch, pinprick, vibration and proprioception in all four " "extremities.      Lab Results (last 24 hours)       Procedure Component Value Units Date/Time    Tissue Pathology Exam [[id number]] Collected: 07/14/25 0956    Specimen: Disc from Spine, Lumbar Updated: 07/15/25 1318     Note to Patients --     This report may contain a detailed description of human tissue sent by a health care provider to the laboratory for pathologic evaluation. The content of this report is essential for diagnosis and may provide important critical findings. This information may be unfamiliar to patients to review without a medical professional present. It is advised that the patient review this report in the presence of a health care provider who can answer questions and explain the results.       Case Report --     Surgical Pathology Report                         Case: [id number]                                  Authorizing Provider:  Corby Edwards MD       Collected:           07/14/2025 09:56 AM          Ordering Location:     [facility] OR  Received:            07/14/2025 01:34 PM          Pathologist:           Fabian Mcdowell MD                                                      Specimen:    Spine, Lumbar, L2-5                                                                         Final Diagnosis --     2-5, discectomy:  Fragments of benign cartilage, bone, and fibroconnective tissue.  No malignancy is identified.       Gross Description --     1. Spine, Lumbar.  Received in a formalin filled container labeled with the patient's name, medical record number and \"L2-5\" are multiple irregular shaped fragments of tan-gray to pink tissue measuring 3 x 1.8 x 1.4 cm in aggregate.  Representative sections are submitted in a cassette labeled 1A which is decalcified prior to processing.           Microscopic Description --     Sections show fragments of bone, cartilage, and fibroconnective tissue.  Acute inflammation is not present.  No malignancy is identified.        "           Plan:   Patient seems to be doing well overall from a back surgery standpoint.  Continue with physical and Occupational Therapy.  Continue to monitor urine output.  Will start on Flomax.      Lumbar degenerative disc disease    Lumbar stenosis with neurogenic claudication    Degeneration of intervertebral disc of lumbar region with discogenic back pain and lower extremity pain        Jluis Parkinson, APRN

## 2025-07-16 NOTE — PLAN OF CARE
Goal Outcome Evaluation:  Plan of Care Reviewed With: patient        Progress: no change  Outcome Evaluation: AOx4, VSS on RA when awake, on 1L NC when sleeping. PRN medications avaliable. Tele on. Up with PT/OT, up with walker and LSO brace. Unsteady gait, loss of balance when trying to set up. Safety precautions maintained, call light within reach at all times.

## 2025-07-16 NOTE — PLAN OF CARE
Goal Outcome Evaluation:  Plan of Care Reviewed With: patient        Progress: no change  Outcome Evaluation: A&Ox4. Room air. Up with assistance with walker and LSO brace to BR. Voiding on own this shift. Activity encouraged. Family at bedside, attentive to pt. C/o pain, prn meds given. Stool softeners given to promote BM. Call light within reach. Plan to discharge tomorrow.

## 2025-07-17 VITALS
RESPIRATION RATE: 14 BRPM | HEART RATE: 79 BPM | SYSTOLIC BLOOD PRESSURE: 124 MMHG | BODY MASS INDEX: 32.93 KG/M2 | TEMPERATURE: 98.5 F | HEIGHT: 71 IN | WEIGHT: 235.23 LBS | OXYGEN SATURATION: 98 % | DIASTOLIC BLOOD PRESSURE: 61 MMHG

## 2025-07-17 PROCEDURE — 63710000001 LEVOTHYROXINE 25 MCG TABLET: Performed by: NURSE PRACTITIONER

## 2025-07-17 PROCEDURE — A9270 NON-COVERED ITEM OR SERVICE: HCPCS | Performed by: NURSE PRACTITIONER

## 2025-07-17 PROCEDURE — 63710000001 SENNOSIDES-DOCUSATE 8.6-50 MG TABLET: Performed by: NURSE PRACTITIONER

## 2025-07-17 PROCEDURE — 99024 POSTOP FOLLOW-UP VISIT: CPT | Performed by: NURSE PRACTITIONER

## 2025-07-17 PROCEDURE — 63710000001 ATORVASTATIN 40 MG TABLET: Performed by: NURSE PRACTITIONER

## 2025-07-17 PROCEDURE — 63710000001 POLYETHYLENE GLYCOL 17 G PACK: Performed by: NURSE PRACTITIONER

## 2025-07-17 PROCEDURE — 63710000001 TAMSULOSIN 0.4 MG CAPSULE: Performed by: NURSE PRACTITIONER

## 2025-07-17 PROCEDURE — 63710000001 GABAPENTIN 300 MG CAPSULE: Performed by: NURSE PRACTITIONER

## 2025-07-17 RX ORDER — OXYCODONE AND ACETAMINOPHEN 7.5; 325 MG/1; MG/1
1 TABLET ORAL EVERY 6 HOURS PRN
Qty: 24 TABLET | Refills: 0 | Status: SHIPPED | OUTPATIENT
Start: 2025-07-17

## 2025-07-17 RX ORDER — CYCLOBENZAPRINE HCL 10 MG
10 TABLET ORAL 3 TIMES DAILY PRN
Qty: 90 TABLET | Refills: 0 | Status: SHIPPED | OUTPATIENT
Start: 2025-07-17

## 2025-07-17 RX ORDER — OXYCODONE AND ACETAMINOPHEN 7.5; 325 MG/1; MG/1
1 TABLET ORAL EVERY 4 HOURS PRN
Start: 2025-07-17 | End: 2025-07-23

## 2025-07-17 RX ADMIN — POLYETHYLENE GLYCOL 3350 17 G: 17 POWDER, FOR SOLUTION ORAL at 09:11

## 2025-07-17 RX ADMIN — GABAPENTIN 600 MG: 300 CAPSULE ORAL at 05:04

## 2025-07-17 RX ADMIN — ATORVASTATIN CALCIUM 40 MG: 40 TABLET, FILM COATED ORAL at 09:11

## 2025-07-17 RX ADMIN — LEVOTHYROXINE SODIUM 25 MCG: 0.03 TABLET ORAL at 05:04

## 2025-07-17 RX ADMIN — TIMOLOL MALEATE 1 DROP: 5 SOLUTION/ DROPS OPHTHALMIC at 09:13

## 2025-07-17 RX ADMIN — SENNOSIDES, DOCUSATE SODIUM 2 TABLET: 50; 8.6 TABLET, FILM COATED ORAL at 09:11

## 2025-07-17 RX ADMIN — TAMSULOSIN HYDROCHLORIDE 0.4 MG: 0.4 CAPSULE ORAL at 09:11

## 2025-07-17 NOTE — DISCHARGE SUMMARY
Date of Discharge:  7/17/2025    Discharge Diagnosis: Lumbar stenosis with neurogenic claudication [M48.062]  Degeneration of intervertebral disc of lumbar region with discogenic back pain and lower extremity pain [M51.362]  Lumbar degenerative disc disease [M51.369]    Presenting Problem/History of Present Illness  Lumbar stenosis with neurogenic claudication [M48.062]  Degeneration of intervertebral disc of lumbar region with discogenic back pain and lower extremity pain [M51.362]  Lumbar degenerative disc disease [M51.369]       Hospital Course  Patient is a 75 y.o. male presented with Lumbar stenosis with neurogenic claudication [M48.062]  Degeneration of intervertebral disc of lumbar region with discogenic back pain and lower extremity pain [M51.362]  Lumbar degenerative disc disease [M51.369].  The patient has been through all manner of conservative care without any meaningful improvement. The patient went to the operating room on 7/14/25 seconds for a L2-3, L3-4, L4-5 left hemilaminectomy facetectomy and TLIF with posterior pedicle screws mentation from L2-S1 via surgical robot the patient tolerated procedure well.  Currently the patient is ambulating.  The patient is tolerating p.o.  The patient is voiding spontaneously.  It is felt that at this time the patient is stable and suitable to be discharged home.  See orders for discharge instructions and restrictions.  The patient can take the dressing off in 48 hours and can get the wound wet at that time.  The patient is to clean the wound daily with soap and water.  They are to call the office with any drainage from the incision or worsening pain.  He is to wear the brace when out of bed.  Not to take any NSAIDs.  I will follow-up with him in the office in 3 weeks.    Procedures Performed  Procedure(s):  LUMBAR LEFT DISCECTOMY, HEMILAMINECTOMY, FACETECTOMY L23,34,45 TRANSFORAMINAL INTERBODY FUSION WITH NEURO ROBOT       Consults:   Consults       No orders  found from 6/15/2025 to 7/15/2025.              Condition on Discharge: Stable    Vital Signs  Temp:  [98.1 °F (36.7 °C)-98.5 °F (36.9 °C)] 98.5 °F (36.9 °C)  Heart Rate:  [77-93] 79  Resp:  [14-16] 14  BP: (114-129)/(57-72) 124/61    Physical Exam:   Physical Exam  Constitutional:       General: He is awake.      Appearance: He is well-developed.   HENT:      Head: Normocephalic.   Eyes:      Extraocular Movements: Extraocular movements intact.      Pupils: Pupils are equal, round, and reactive to light.   Pulmonary:      Effort: Pulmonary effort is normal.   Musculoskeletal:         General: Normal range of motion.      Cervical back: Normal range of motion.   Skin:     General: Skin is warm.   Neurological:      Mental Status: He is alert and oriented to person, place, and time.      GCS: GCS eye subscore is 4. GCS verbal subscore is 5. GCS motor subscore is 6.      Cranial Nerves: No cranial nerve deficit.      Sensory: No sensory deficit.      Motor: Motor strength is normal.     Gait: Gait normal.      Deep Tendon Reflexes: Reflexes are normal and symmetric.   Psychiatric:         Speech: Speech normal.         Behavior: Behavior normal.         Thought Content: Thought content normal.          Neurological Exam  Mental Status  Awake and alert. Oriented to person, place and time. Oriented to person, place, and time. Speech is normal. Language is fluent with no aphasia. Attention and concentration are normal.    Cranial Nerves  CN I: Sense of smell is normal.  CN II: Right normal visual field. Left normal visual field.  CN III, IV, VI: Extraocular movements intact bilaterally. Pupils equal round and reactive to light bilaterally.  CN V: Facial sensation is normal.  CN VII:  Right: There is no facial weakness.  Left: There is no facial weakness.  CN XI: Shoulder shrug strength is normal.  CN XII: Tongue midline without atrophy or fasciculations.    Motor  Normal muscle bulk throughout. Normal muscle tone.  Strength is 5/5 throughout all four extremities.    Sensory  Sensation is intact to light touch, pinprick, vibration and proprioception in all four extremities.    Reflexes  Deep tendon reflexes are 2+ and symmetric in all four extremities.    Gait   Normal gait.         Discharge Disposition  Home or Self Care    Discharge Medications     Discharge Medications        New Medications        Instructions Start Date   cyclobenzaprine 10 MG tablet  Commonly known as: FLEXERIL   10 mg, Oral, 3 Times Daily PRN      naloxone 4 MG/0.1ML nasal spray  Commonly known as: NARCAN   1 spray, Nasal, As Needed      oxyCODONE-acetaminophen 7.5-325 MG per tablet  Commonly known as: PERCOCET   1 tablet, Oral, Every 4 Hours PRN             Continue These Medications        Instructions Start Date   amLODIPine 10 MG tablet  Commonly known as: NORVASC   10 mg, Daily      aspirin 325 MG tablet   325 mg, Oral, Daily      atorvastatin 40 MG tablet  Commonly known as: LIPITOR   40 mg, Daily      gabapentin 600 MG tablet  Commonly known as: Neurontin   600 mg, Oral, 3 Times Daily      latanoprost 0.005 % ophthalmic solution  Commonly known as: XALATAN   1 drop, Nightly      levothyroxine 25 MCG tablet  Commonly known as: SYNTHROID, LEVOTHROID   25 mcg, Every Early Morning      losartan-hydrochlorothiazide 100-12.5 MG per tablet  Commonly known as: HYZAAR   1 tablet, Every Morning      PRESERVISION AREDS 2 PO   1 tablet, 2 Times Daily      timolol 0.5 % ophthalmic solution  Commonly known as: TIMOPTIC   1 drop, Right Eye, Every Morning             Stop These Medications      diclofenac 75 MG EC tablet  Commonly known as: VOLTAREN     tiZANidine 4 MG tablet  Commonly known as: ZANAFLEX              Discharge Diet:   Diet Instructions       Diet: Regular/House Diet; Regular Texture (IDDSI 7); Thin (IDDSI 0)      Discharge Diet: Regular/House Diet    Texture: Regular Texture (IDDSI 7)    Fluid Consistency: Thin (IDDSI 0)            Activity at  Discharge:   Activity Instructions       Other Instructions (Specify)      Activity Instructions: no strenuous activity. Wear brace when out of bed or sitting up.  No NSAIDs            Follow-up Appointments  Future Appointments   Date Time Provider Department Center   8/11/2025  9:15 AM Giselle Parkinson APRN [facility]   10/9/2025  4:15 PM Corby Edwards MD [facility]     Additional Instructions for the Follow-ups that You Need to Schedule       Call MD With Problems / Concerns   As directed      Call with worsening back or leg pain, drainage from wound, fever, or difficulty walking    Order Comments: Call with worsening back or leg pain, drainage from wound, fever, or difficulty walking         Discharge Follow-up with Specialty: giselle parkinson np; 3 Weeks   As directed      Specialty: giselle parkinson np   Follow Up: 3 Weeks                Test Results Pending at Discharge       LICO Figueroa  07/17/25  07:06 CDT    Time: Discharge 20 min

## 2025-07-17 NOTE — PLAN OF CARE
Goal Outcome Evaluation:                 Pt a/ox4, vss on RA. Bilat Ivs removed per order. Pt up with walker stand by assist. Voiding in BR. No c/o pain today. Pt to be d/c home today. Brace on when OOB. Family at bedside. Call light in reach, safety maintained.

## 2025-07-17 NOTE — THERAPY DISCHARGE NOTE
Acute Care - Physical Therapy Discharge Summary  [facility]       Patient Name: Jaime Mckeon  : 1950  MRN: [id number]    Today's Date: 2025                 Admit Date: 2025      PT Recommendation and Plan    Visit Dx:    ICD-10-CM ICD-9-CM   1. Impaired mobility [Z74.09]  Z74.09 799.89   2. Lumbar stenosis with neurogenic claudication  M48.062 724.03   3. Degeneration of intervertebral disc of lumbar region with discogenic back pain and lower extremity pain  M51.362 722.52   4. Degeneration of intervertebral disc of lumbar region with discogenic back pain  M51.360 722.52        Outcome Measures       Row Name 25 1145 25 0902          How much help from another person do you currently need...    Turning from your back to your side while in flat bed without using bedrails? -- 3  -COLE     Moving from lying on back to sitting on the side of a flat bed without bedrails? -- 3  -COLE     Moving to and from a bed to a chair (including a wheelchair)? -- 3  -COLE     Standing up from a chair using your arms (e.g., wheelchair, bedside chair)? -- 3  -COLE     Climbing 3-5 steps with a railing? -- 3  -COLE     To walk in hospital room? -- 3  -COLE     AM-PAC 6 Clicks Score (PT) -- 18  -COLE        How much help from another is currently needed...    Putting on and taking off regular lower body clothing? 2  -AC --     Bathing (including washing, rinsing, and drying) 3  -AC --     Toileting (which includes using toilet bed pan or urinal) 3  -AC --     Putting on and taking off regular upper body clothing 3  -AC --     Taking care of personal grooming (such as brushing teeth) 3  -AC --     Eating meals 4  -AC --     AM-PAC 6 Clicks Score (OT) 18  -AC --        Functional Assessment    Outcome Measure Options AM-PAC 6 Clicks Daily Activity (OT)  -AC AM-PAC 6 Clicks Basic Mobility (PT)  -COLE               User Key  (r) = Recorded By, (t) = Taken By, (c) = Cosigned By      Initials Name Provider Type    JOHNNY Ko  Yannick PEPE, OTR/L, CNT Occupational Therapist    Charanjit Hernandez, PTA Physical Therapist Assistant                         PT Rehab Goals       Row Name 07/17/25 1100             Bed Mobility Goal 1 (PT)    Activity/Assistive Device (Bed Mobility Goal 1, PT) rolling to left;rolling to right;sidelying to sit/sit to sidelying  -AB      [place] Level/Cues Needed (Bed Mobility Goal 1, PT) independent  -AB      Time Frame (Bed Mobility Goal 1, PT) long term goal (LTG)  -AB      Progress/Outcomes (Bed Mobility Goal 1, PT) goal not met  -AB         Transfer Goal 1 (PT)    Activity/Assistive Device (Transfer Goal 1, PT) sit-to-stand/stand-to-sit;bed-to-chair/chair-to-bed;walker, rolling  -AB      [place] Level/Cues Needed (Transfer Goal 1, PT) modified independence  -AB      Time Frame (Transfer Goal 1, PT) long term goal (LTG)  -AB      Progress/Outcome (Transfer Goal 1, PT) goal not met  -AB         Gait Training Goal 1 (PT)    Activity/Assistive Device (Gait Training Goal 1, PT) gait (walking locomotion);assistive device use;decrease fall risk;improve balance and speed;diminish gait deviation;decrease asymmetrical patterns;increase endurance/gait distance;walker, rolling  -AB      [place] Level (Gait Training Goal 1, PT) modified independence  -AB      Distance (Gait Training Goal 1, PT) 120' w/ increased step and stride length  -AB      Time Frame (Gait Training Goal 1, PT) long term goal (LTG)  -AB      Progress/Outcome (Gait Training Goal 1, PT) goal not met  -AB         Balance Goal 1 (PT)    Activity/Assistive Device (Balance Goal) sitting static balance;sitting dynamic balance;standing static balance;standing dynamic balance;walker, rolling  -AB      [place] Level/Cues Needed (Balance Goal 1, PT) modified independence  -AB      Time Frame (Balance Goal 1, PT) long-term goal (LTG)  -AB      Progress/Outcomes (Balance Goal 1, PT) goal not met  -AB         Stairs Goal 1 (PT)     Activity/Assistive Device (Stairs Goal 1, PT) ascending stairs;descending stairs;using handrail, right;using handrail, left;step-over step;decrease fall risk;improve balance and speed  -AB      [place] Level/Cues Needed (Stairs Goal 1, PT) modified independence  -AB      Number of Stairs (Stairs Goal 1, PT) 3  -AB      Time Frame (Stairs Goal 1, PT) long term goal (LTG)  -AB      Progress/Outcome (Stairs Goal 1, PT) goal not met  -AB         Problem Specific Goal 1 (PT)    Problem Specific Goal 1 (PT) decrease pain w/ amb & mob to 2/10  -AB      Progress/Outcome (Problem Specific Goal 1, PT) goal not met  -AB                User Key  (r) = Recorded By, (t) = Taken By, (c) = Cosigned By      Initials Name Provider Type Discipline    Meli Ch, PTA Physical Therapist Assistant PT                        PT Discharge Summary  Anticipated Discharge Disposition (PT): home with assist  Reason for Discharge: Discharge from facility  Outcomes Achieved: Refer to plan of care for updates on goals achieved  Discharge Destination: Home with assist      Meli Marie PTA   7/17/2025

## 2025-07-17 NOTE — THERAPY DISCHARGE NOTE
Acute Care - Occupational Therapy Discharge Summary  [facility]     Patient Name: Jaime Mckeon  : 1950  MRN: [id number]    Today's Date: 2025                 Admit Date: 2025        OT Recommendation and Plan    Visit Dx:    ICD-10-CM ICD-9-CM   1. Impaired mobility [Z74.09]  Z74.09 799.89   2. Lumbar stenosis with neurogenic claudication  M48.062 724.03   3. Degeneration of intervertebral disc of lumbar region with discogenic back pain and lower extremity pain  M51.362 722.52   4. Degeneration of intervertebral disc of lumbar region with discogenic back pain  M51.360 722.52                OT Rehab Goals       Row Name 25 1000             Transfer Goal 1 (OT)    Activity/Assistive Device (Transfer Goal 1, OT) toilet  -AC      [place] Level/Cues Needed (Transfer Goal 1, OT) standby assist  -AC      Time Frame (Transfer Goal 1, OT) long term goal (LTG);10 days  -AC      Progress/Outcome (Transfer Goal 1, OT) goal not met  -AC         Dressing Goal 1 (OT)    Activity/Device (Dressing Goal 1, OT) dressing skills, all  -AC      [place]/Cues Needed (Dressing Goal 1, OT) minimum assist (75% or more patient effort)  -AC      Time Frame (Dressing Goal 1, OT) long term goal (LTG);10 days  -AC      Progress/Outcome (Dressing Goal 1, OT) goal not met  -AC         Toileting Goal 1 (OT)    Activity/Device (Toileting Goal 1, OT) toileting skills, all  -AC      [place] Level/Cues Needed (Toileting Goal 1, OT) standby assist  -AC      Time Frame (Toileting Goal 1, OT) long term goal (LTG);10 days  -AC      Progress/Outcome (Toileting Goal 1, OT) goal not met  -AC         Problem Specific Goal 1 (OT)    Problem Specific Goal 1 (OT) Pt will independently implement one pain management technique to decrease pain and improve functional adl performance.  -AC      Time Frame (Problem Specific Goal 1, OT) long term goal (LTG);by discharge  -AC      Progress/Outcome (Problem Specific Goal 1, OT) goal  not met  -                User Key  (r) = Recorded By, (t) = Taken By, (c) = Cosigned By      Initials Name Provider Type Discipline    Yannick Jones, OTR/L, JON Occupational Therapist OT                     Outcome Measures       Row Name 07/16/25 1145 07/16/25 0902          How much help from another person do you currently need...    Turning from your back to your side while in flat bed without using bedrails? -- 3  -COLE     Moving from lying on back to sitting on the side of a flat bed without bedrails? -- 3  -COLE     Moving to and from a bed to a chair (including a wheelchair)? -- 3  -COLE     Standing up from a chair using your arms (e.g., wheelchair, bedside chair)? -- 3  -COLE     Climbing 3-5 steps with a railing? -- 3  -COLE     To walk in hospital room? -- 3  -COLE     AM-PAC 6 Clicks Score (PT) -- 18  -COLE        How much help from another is currently needed...    Putting on and taking off regular lower body clothing? 2  -AC --     Bathing (including washing, rinsing, and drying) 3  -AC --     Toileting (which includes using toilet bed pan or urinal) 3  -AC --     Putting on and taking off regular upper body clothing 3  -AC --     Taking care of personal grooming (such as brushing teeth) 3  -AC --     Eating meals 4  -AC --     AM-PAC 6 Clicks Score (OT) 18  -AC --        Functional Assessment    Outcome Measure Options AM-PAC 6 Clicks Daily Activity (OT)  -AC AM-PAC 6 Clicks Basic Mobility (PT)  -COLE               User Key  (r) = Recorded By, (t) = Taken By, (c) = Cosigned By      Initials Name Provider Type    Yannick Jones, OTR/L, JON Occupational Therapist    Charanjit Hernandez, ANA LAURA Physical Therapist Assistant                    Timed Therapy Charges  Total Units: 2      Suggested Charges  Total Units: 2      Procedure Name Documented Minutes Units Code    HC OT SELF CARE/MGMT/TRAIN EA 15 MIN 30 2   01373 (CPT®)                 Documented Minutes  Total Minutes: 30      Therapy Provided Minutes     97599 - OT Self Care/Mgmt Minutes 30                    Therapy Charges for Today       Code Description Service Date Service Provider Modifiers Qty    [id number] HC OT SELF CARE/MGMT/TRAIN EA 15 MIN 7/16/2025 Yannick Ko OTR/L, JON GO 2            OT Discharge Summary  Anticipated Discharge Disposition (OT): home with assist  Reason for Discharge: Discharge from facility  Outcomes Achieved: Refer to plan of care for updates on goals achieved  Discharge Destination: Home with assist      RUBA Gooden/L, CNT  7/17/2025

## 2025-07-17 NOTE — PLAN OF CARE
Goal Outcome Evaluation:  Plan of Care Reviewed With: patient        Progress: no change  Outcome Evaluation: AOx4, VSS on RA when awake, 1L NC when sleeping. C/o pain, PRN pain medication given with relief provided. Up with standby assist with walker and LSO brace. Voiding without issue. Stool softener given. Sa[place] precautions maintained, call light within reach at all times.

## 2025-07-29 DIAGNOSIS — G89.29 CHRONIC BILATERAL LOW BACK PAIN WITH BILATERAL SCIATICA: ICD-10-CM

## 2025-07-29 DIAGNOSIS — M54.41 CHRONIC BILATERAL LOW BACK PAIN WITH BILATERAL SCIATICA: ICD-10-CM

## 2025-07-29 DIAGNOSIS — M51.362 DEGENERATION OF INTERVERTEBRAL DISC OF LUMBAR REGION WITH DISCOGENIC BACK PAIN AND LOWER EXTREMITY PAIN: ICD-10-CM

## 2025-07-29 DIAGNOSIS — M54.42 CHRONIC BILATERAL LOW BACK PAIN WITH BILATERAL SCIATICA: ICD-10-CM

## 2025-07-29 DIAGNOSIS — M48.062 LUMBAR STENOSIS WITH NEUROGENIC CLAUDICATION: ICD-10-CM

## 2025-07-29 RX ORDER — GABAPENTIN 600 MG/1
600 TABLET ORAL 3 TIMES DAILY
Qty: 90 TABLET | Refills: 2 | Status: SHIPPED | OUTPATIENT
Start: 2025-07-29

## 2025-08-11 ENCOUNTER — OFFICE VISIT (OUTPATIENT)
Dept: NEUROSURGERY | Facility: CLINIC | Age: 75
End: 2025-08-11
Payer: MEDICARE

## 2025-08-11 ENCOUNTER — HOSPITAL ENCOUNTER (OUTPATIENT)
Dept: GENERAL RADIOLOGY | Facility: HOSPITAL | Age: 75
Discharge: HOME | End: 2025-08-11
Admitting: NURSE PRACTITIONER
Payer: MEDICARE

## 2025-08-11 VITALS — BODY MASS INDEX: 32.9 KG/M2 | WEIGHT: 235 LBS | HEIGHT: 71 IN

## 2025-08-11 DIAGNOSIS — M51.362 DEGENERATION OF INTERVERTEBRAL DISC OF LUMBAR REGION WITH DISCOGENIC BACK PAIN AND LOWER EXTREMITY PAIN: ICD-10-CM

## 2025-08-11 DIAGNOSIS — M48.062 LUMBAR STENOSIS WITH NEUROGENIC CLAUDICATION: ICD-10-CM

## 2025-08-11 DIAGNOSIS — E66.811 CLASS 1 OBESITY DUE TO EXCESS CALORIES WITH SERIOUS COMORBIDITY AND BODY MASS INDEX (BMI) OF 30.0 TO 30.9 IN ADULT: ICD-10-CM

## 2025-08-11 DIAGNOSIS — M54.42 CHRONIC BILATERAL LOW BACK PAIN WITH BILATERAL SCIATICA: Primary | ICD-10-CM

## 2025-08-11 DIAGNOSIS — M54.42 CHRONIC BILATERAL LOW BACK PAIN WITH BILATERAL SCIATICA: ICD-10-CM

## 2025-08-11 DIAGNOSIS — M54.41 CHRONIC BILATERAL LOW BACK PAIN WITH BILATERAL SCIATICA: ICD-10-CM

## 2025-08-11 DIAGNOSIS — M54.41 CHRONIC BILATERAL LOW BACK PAIN WITH BILATERAL SCIATICA: Primary | ICD-10-CM

## 2025-08-11 DIAGNOSIS — G89.29 CHRONIC BILATERAL LOW BACK PAIN WITH BILATERAL SCIATICA: Primary | ICD-10-CM

## 2025-08-11 DIAGNOSIS — E66.09 CLASS 1 OBESITY DUE TO EXCESS CALORIES WITH SERIOUS COMORBIDITY AND BODY MASS INDEX (BMI) OF 30.0 TO 30.9 IN ADULT: ICD-10-CM

## 2025-08-11 DIAGNOSIS — G89.29 CHRONIC BILATERAL LOW BACK PAIN WITH BILATERAL SCIATICA: ICD-10-CM

## 2025-08-11 PROCEDURE — 1159F MED LIST DOCD IN RCRD: CPT | Performed by: NURSE PRACTITIONER

## 2025-08-11 PROCEDURE — 72110 X-RAY EXAM L-2 SPINE 4/>VWS: CPT

## 2025-08-11 PROCEDURE — 99024 POSTOP FOLLOW-UP VISIT: CPT | Performed by: NURSE PRACTITIONER

## 2025-08-11 PROCEDURE — 1160F RVW MEDS BY RX/DR IN RCRD: CPT | Performed by: NURSE PRACTITIONER

## 2025-08-19 ENCOUNTER — TELEPHONE (OUTPATIENT)
Dept: NEUROSURGERY | Facility: CLINIC | Age: 75
End: 2025-08-19
Payer: MEDICARE

## (undated) DEVICE — DRP C/ARM W/BAND W/CLIPS 41X74IN

## (undated) DEVICE — PROBE 8225101 5PK STD PRASS FL TIP ROHS

## (undated) DEVICE — COVER,MAYO STAND,STERILE: Brand: MEDLINE

## (undated) DEVICE — 4-PORT MANIFOLD: Brand: NEPTUNE 2

## (undated) DEVICE — GLV SURG BIOGEL LTX PF 8

## (undated) DEVICE — SINGLE-USE POLYPECTOMY SNARE: Brand: CAPTIVATOR II

## (undated) DEVICE — SENSR O2 OXIMAX FNGR A/ 18IN NONSTR

## (undated) DEVICE — TOOL MR8-31TD3030 MR8 TWST DRIL 3MMX30MM: Brand: MIDAS REX

## (undated) DEVICE — SPK10277 JACKSON/PRO-AXIS KIT: Brand: SPK10277 JACKSON/PRO-AXIS KIT

## (undated) DEVICE — CONN FLX BREATHE CIRCT

## (undated) DEVICE — THE SINGLE USE ETRAP – POLYP TRAP IS USED FOR SUCTION RETRIEVAL OF ENDOSCOPICALLY REMOVED POLYPS.: Brand: ETRAP

## (undated) DEVICE — TOOL MR8-14MH30 MR8 14CM MATCH 3MM: Brand: MIDAS REX MR8

## (undated) DEVICE — THE CHANNEL CLEANING BRUSH IS A NYLON FLEXI BRUSH ATTACHED TO A FLEXIBLE PLASTIC SHEATH DESIGNED TO SAFELY REMOVE DEBRIS FROM FLEXIBLE ENDOSCOPES.

## (undated) DEVICE — KT SPINE MAZORX DISP

## (undated) DEVICE — PK SPINE POST 30

## (undated) DEVICE — CATH IV ANGIO FEP 12G 3IN LTBLU 10PK

## (undated) DEVICE — INSTRUMENT 9560658 QUADRANT ILLUMIN SYS: Brand: MAST QUADRANT™ RETRACTOR SYSTEM

## (undated) DEVICE — ELECTRD BLD EZ CLN MOD 4IN

## (undated) DEVICE — TOOL MR8-31AC75W MR8 31CM ACRN 7.5MM WNG

## (undated) DEVICE — PAD,NON-ADHERENT,3X8,STERILE,LF,1/PK: Brand: MEDLINE

## (undated) DEVICE — SUT VIC 0 MO4 CR8 18IN VCP701D

## (undated) DEVICE — Device: Brand: DEFENDO AIR/WATER/SUCTION AND BIOPSY VALVE

## (undated) DEVICE — TBG SMPL FLTR LINE NASL 02/C02 A/ BX/100

## (undated) DEVICE — MASK,OXYGEN,MED CONC,ADLT,7' TUB, UC: Brand: PENDING

## (undated) DEVICE — PROXIMATE RH ROTATING HEAD SKIN STAPLERS (35 REGULAR) CONTAINS 35 STAINLESS STEEL STAPLES: Brand: PROXIMATE

## (undated) DEVICE — DEFENDO AIR WATER SUCTION AND BIOPSY VALVE KIT FOR  OLYMPUS: Brand: DEFENDO AIR/WATER/SUCTION AND BIOPSY VALVE

## (undated) DEVICE — DISPOSABLE DRAPE, STERILE, FOR A CDS-3072 6 FOOT TABLE: Brand: PEDIGO PRODUCTS, INC.

## (undated) DEVICE — DRP C/ARMOR

## (undated) DEVICE — NEEDLE, QUINCKE, 18GX3.5": Brand: MEDLINE

## (undated) DEVICE — YANKAUER,BULB TIP WITH VENT: Brand: ARGYLE

## (undated) DEVICE — UTILITY MARKER W/MED LABELS: Brand: MEDLINE

## (undated) DEVICE — VAGINAL PREP TRAY: Brand: MEDLINE INDUSTRIES, INC.

## (undated) DEVICE — SPHR MARKR STEALTH STATION

## (undated) DEVICE — ANTIBACTERIAL VIOLET BRAIDED (POLYGLACTIN 910), SYNTHETIC ABSORBABLE SUTURE: Brand: COATED VICRYL

## (undated) DEVICE — SUT MNCRYL 4/0 PS2 27IN UD MCP426H

## (undated) DEVICE — GLV SURG DERMASSURE GRN LF PF 8.0

## (undated) DEVICE — KNIF BAYO METRX DISECT

## (undated) DEVICE — 1LYRTR 16FR10ML100%SIL UMS SNP: Brand: MEDLINE INDUSTRIES, INC.

## (undated) DEVICE — CLTH CLENS READYCLEANSE PERI CARE PK/5

## (undated) DEVICE — ELECTRD BLD EZ CLN MOD XLNG 2.75IN

## (undated) DEVICE — THE STERILE LIGHT HANDLE COVER IS USED WITH STERIS SURGICAL LIGHTING AND VISUALIZATION SYSTEMS.

## (undated) DEVICE — CUFF,BP,DISP,1 TUBE,ADULT,HP: Brand: MEDLINE

## (undated) DEVICE — FRCP BIOP ENDO CAPTURAPRO SPK SERR 2.8MM 230CM

## (undated) DEVICE — THE STERILE THE STERIS STERILE CAMERA HANDLE COVERS ARE DESIGNED FOR HARMONYAIR 4K CAMERA MODULE, AND PROVIDE STERILE CONTROL THAT ALLOW FOR INCREASING AND DECREASING ILLUMINATION THROUGH SEVEN INTENSITY LEVELS.

## (undated) DEVICE — ARGYLE YANKAUER BULB TIP WITH VENT: Brand: ARGYLE

## (undated) DEVICE — STRIP,CLOSURE,WOUND,MEDI-STRIP,1/2X4: Brand: MEDLINE